# Patient Record
Sex: FEMALE | Race: WHITE | Employment: FULL TIME | URBAN - METROPOLITAN AREA
[De-identification: names, ages, dates, MRNs, and addresses within clinical notes are randomized per-mention and may not be internally consistent; named-entity substitution may affect disease eponyms.]

---

## 2016-12-07 LAB
ANTIBODY SCREEN, EXTERNAL: NEGATIVE
HBSAG, EXTERNAL: NEGATIVE
HCT, EXTERNAL: 38.4
HGB, EXTERNAL: 12.9
HIV, EXTERNAL: NEGATIVE
RPR, EXTERNAL: NON REACTIVE
RUBELLA, EXTERNAL: NORMAL
TYPE, ABO & RH, EXTERNAL: NORMAL

## 2017-06-02 ENCOUNTER — HOSPITAL ENCOUNTER (OUTPATIENT)
Age: 27
Setting detail: OBSERVATION
LOS: 1 days | Discharge: HOME | End: 2017-06-02
Attending: OBSTETRICS & GYNECOLOGY | Admitting: OBSTETRICS & GYNECOLOGY
Payer: COMMERCIAL

## 2017-06-02 VITALS
TEMPERATURE: 98.1 F | DIASTOLIC BLOOD PRESSURE: 90 MMHG | SYSTOLIC BLOOD PRESSURE: 134 MMHG | WEIGHT: 180 LBS | BODY MASS INDEX: 29.99 KG/M2 | HEIGHT: 65 IN | RESPIRATION RATE: 18 BRPM | HEART RATE: 74 BPM

## 2017-06-02 LAB
ALBUMIN SERPL BCP-MCNC: 2.3 G/DL (ref 3.5–5)
ALBUMIN/GLOB SERPL: 0.5 {RATIO} (ref 1.1–2.2)
ALP SERPL-CCNC: 202 U/L (ref 45–117)
ALT SERPL-CCNC: 174 U/L (ref 12–78)
ANION GAP BLD CALC-SCNC: 11 MMOL/L (ref 5–15)
AST SERPL W P-5'-P-CCNC: 99 U/L (ref 15–37)
BILIRUB SERPL-MCNC: 0.4 MG/DL (ref 0.2–1)
BUN SERPL-MCNC: 8 MG/DL (ref 6–20)
BUN/CREAT SERPL: 10 (ref 12–20)
CALCIUM SERPL-MCNC: 9.3 MG/DL (ref 8.5–10.1)
CHLORIDE SERPL-SCNC: 107 MMOL/L (ref 97–108)
CO2 SERPL-SCNC: 20 MMOL/L (ref 21–32)
CREAT SERPL-MCNC: 0.81 MG/DL (ref 0.55–1.02)
CREAT UR-MCNC: 35.9 MG/DL
ERYTHROCYTE [DISTWIDTH] IN BLOOD BY AUTOMATED COUNT: 13.4 % (ref 11.5–14.5)
GLOBULIN SER CALC-MCNC: 4.3 G/DL (ref 2–4)
GLUCOSE SERPL-MCNC: 113 MG/DL (ref 65–100)
HCT VFR BLD AUTO: 34.6 % (ref 35–47)
HGB BLD-MCNC: 11.9 G/DL (ref 11.5–16)
MCH RBC QN AUTO: 30 PG (ref 26–34)
MCHC RBC AUTO-ENTMCNC: 34.4 G/DL (ref 30–36.5)
MCV RBC AUTO: 87.2 FL (ref 80–99)
PLATELET # BLD AUTO: 210 K/UL (ref 150–400)
POTASSIUM SERPL-SCNC: 3.8 MMOL/L (ref 3.5–5.1)
PROT SERPL-MCNC: 6.6 G/DL (ref 6.4–8.2)
PROT UR-MCNC: 6 MG/DL (ref 0–11.9)
PROT/CREAT UR-RTO: 0.2
RBC # BLD AUTO: 3.97 M/UL (ref 3.8–5.2)
SODIUM SERPL-SCNC: 138 MMOL/L (ref 136–145)
WBC # BLD AUTO: 11.1 K/UL (ref 3.6–11)

## 2017-06-02 PROCEDURE — 80053 COMPREHEN METABOLIC PANEL: CPT | Performed by: OBSTETRICS & GYNECOLOGY

## 2017-06-02 PROCEDURE — 85027 COMPLETE CBC AUTOMATED: CPT | Performed by: OBSTETRICS & GYNECOLOGY

## 2017-06-02 PROCEDURE — 74011250636 HC RX REV CODE- 250/636: Performed by: OBSTETRICS & GYNECOLOGY

## 2017-06-02 PROCEDURE — 99218 HC RM OBSERVATION: CPT

## 2017-06-02 PROCEDURE — 84156 ASSAY OF PROTEIN URINE: CPT | Performed by: OBSTETRICS & GYNECOLOGY

## 2017-06-02 PROCEDURE — 65410000002 HC RM PRIVATE OB

## 2017-06-02 PROCEDURE — 82239 BILE ACIDS TOTAL: CPT | Performed by: OBSTETRICS & GYNECOLOGY

## 2017-06-02 PROCEDURE — 96372 THER/PROPH/DIAG INJ SC/IM: CPT

## 2017-06-02 PROCEDURE — 36415 COLL VENOUS BLD VENIPUNCTURE: CPT | Performed by: OBSTETRICS & GYNECOLOGY

## 2017-06-02 PROCEDURE — 59025 FETAL NON-STRESS TEST: CPT

## 2017-06-02 RX ORDER — URSODIOL 500 MG/1
500 TABLET, FILM COATED ORAL 2 TIMES DAILY
COMMUNITY
End: 2017-06-13

## 2017-06-02 RX ORDER — BETAMETHASONE SODIUM PHOSPHATE AND BETAMETHASONE ACETATE 3; 3 MG/ML; MG/ML
12 INJECTION, SUSPENSION INTRA-ARTICULAR; INTRALESIONAL; INTRAMUSCULAR; SOFT TISSUE EVERY 24 HOURS
Status: DISCONTINUED | OUTPATIENT
Start: 2017-06-02 | End: 2017-06-02 | Stop reason: HOSPADM

## 2017-06-02 RX ADMIN — BETAMETHASONE SODIUM PHOSPHATE AND BETAMETHASONE ACETATE 12 MG: 3; 3 INJECTION, SUSPENSION INTRA-ARTICULAR; INTRALESIONAL; INTRAMUSCULAR at 18:56

## 2017-06-02 NOTE — DISCHARGE INSTRUCTIONS
HYPERTENSIVE DISORDERS OF PREGNANCY DISCHARGE INSTRUCTIONS       Preeclampsia: Care Instructions  Your Care Instructions  Preeclampsia occurs when a woman's blood pressure rises during pregnancy. Often with preeclampsia, you also have swelling in your legs, hands, and face. A test may show too much protein in your urine. Preeclampsia is also called toxemia. If preeclampsia is severe and not treated, it can lead to seizures (eclampsia) and damage to your liver or kidneys. Preeclampsia can prevent your baby from getting enough food and oxygen. This can cause a low birth weight or other problems. Your doctor will watch you closely to prevent these problems. He or she also may recommend that you rest in bed most of the day. If your preeclampsia is a danger to your health or the health of your baby, your doctor may need to deliver your baby early. While preeclampsia is a concern, most women with preeclampsia have healthy babies. After a woman gives birth, preeclampsia usually goes away on its own. Follow-up care is a key part of your treatment and safety. Be sure to make and go to all appointments, and call your doctor if you are having problems. It's also a good idea to know your test results and keep a list of the medicines you take. How can you care for yourself at home? · Take and record your blood pressure at home if your doctor tells you to. ¨ Learn the importance of the two measures of blood pressure (such as 120 over 80, or 120/80). The first number is the systolic pressure, which is the force of blood on the artery walls as the heart pumps. The second number is the diastolic pressure, which is the force of blood on the artery walls between heartbeats, when the heart is at rest. You have a choice of monitors to use. ¨ Manual monitor: You pump up the cuff and use a stethoscope to listen for your pulse. ¨ Electronic monitor: The cuff inflates, and a gauge shows your pulse rate.   ¨ To take your blood pressure:  ¨ Ask your doctor to check your blood pressure monitor to be sure that it is accurate and that the cuff fits you. Also ask your doctor to watch you to make sure that you are using it right. ¨ You should not eat, use tobacco products, or use medicine known to raise blood pressure (such as some nasal decongestant sprays) before you take your blood pressure. ¨ Avoid taking your blood pressure if you have just exercised or are nervous or upset. Rest at least 15 minutes before you take your blood pressure. · If your doctor advises, check the protein levels in your urine. Your doctor or nurse will show you how to do this. · Take your medicines exactly as prescribed. Call your doctor if you think you are having a problem with your medicine. · Do not smoke. Quitting smoking will help lower your blood pressure and improve your baby's growth and health. If you need help quitting, talk to your doctor about stop-smoking programs and medicines. These can increase your chances of quitting for good. · Eat a balanced and healthy diet that has lots of fruits and vegetables. · If your doctor advised bed rest, be sure to stay off your feet and rest as much as possible. ¨ Keep a phone, phone book, notepad, and pen near the bed where you can easily reach them. ¨ Gently stretch your legs every hour to maintain good blood flow. ¨ Have another family member pack snacks and lunch food in a cooler close to your bed. ¨ Use this time for activities that you usually cannot find time for, such as reading, craft projects, or letter writing. · You can keep track of your baby's health by noting the length of time it takes to count 10 movements (such as kicks, flutters, or rolls). Feeling 10 movements in less than 1 hour is considered normal. Track your baby's movements once each day, and bring this record with you to each prenatal visit. When should you call for help? Call 911 anytime you think you may need emergency care. For example, call if:  · You have a seizure. · You passed out (lost consciousness). · You have severe vaginal bleeding. · You have severe pain in your belly or pelvis. · You have had fluid gushing or leaking from your vagina and you know or think the umbilical cord is bulging into your vagina. If this happens, immediately get down on your knees so your rear end (buttocks) is higher than your head. This will decrease the pressure on the cord until help arrives. Call your doctor now or seek immediate medical care if:  · You have sudden swelling of your face, hands, or feet. · You have new vision problems (such as dimness or blurring). · You have a severe headache. · You have any vaginal bleeding. · You have belly pain or cramping. · You have a fever. · You have had regular contractions (with or without pain) for an hour. This means that you have 8 or more within 1 hour or 4 or more in 20 minutes after you change your position and drink fluids. · You have a sudden release of fluid from the vagina. · You have low back pain or pelvic pressure that does not go away. · You notice that your baby has stopped moving or is moving much less than normal.  Watch closely for changes in your health, and be sure to contact your doctor if you have any questions. Where can you learn more? Go to http://natalie-morenita.info/. Enter K090 in the search box to learn more about \"Preeclampsia: Care Instructions. \"  Current as of: May 30, 2016  Content Version: 11.2  © 4047-6981 [employer]. Care instructions adapted under license by [employer] (which disclaims liability or warranty for this information). If you have questions about a medical condition or this instruction, always ask your healthcare professional. [address] any warranty or liability for your use of this information.       These are general instructions for a healthy lifestyle:    No smoking/ No tobacco products/ Avoid exposure to second hand smoke    Surgeon General's Warning:  Quitting smoking now greatly reduces serious risk to your health. Obesity, smoking, and sedentary lifestyle greatly increases your risk for illness    A healthy diet, regular physical exercise & weight monitoring are important for maintaining a healthy lifestyle    Recognize signs and symptoms of STROKE:    F-face looks uneven    A-arms unable to move or move unevenly    S-speech slurred or non-existent    T-time-call 911 as soon as signs and symptoms begin-DO NOT go       Back to bed or wait to see if you get better-TIME IS BRAIN. Return to Labor and Delivery 6/3 Saturday  at 6pm for blood pressure checks, a  second betamethasone and to turn in 24 hour urine collection.

## 2017-06-02 NOTE — PROGRESS NOTES
1515 pt 33 3/7 wks IUP to floor for nst, eval of bps, labs and bile acids. 1800  in to see pt, plan to administer betamethasone this evening, take 24 hour urine collection home and return to L&D 24 hours later for second beta and results of urine collection. Reviewed with patient collection process, and storage of urine. 1840 24 urine collection started    1900 pt sent home with instructions to continue to collect 24 hour urine, and a second jug if needed. As well as s/s of preeclampsia and when to report to doctor. Pt states she understands and is going to return to Labor and and delivery at 6p tomorrow.

## 2017-06-02 NOTE — IP AVS SNAPSHOT
Summary of Care Report The Summary of Care report has been created to help improve care coordination. Users with access to "[employer] or [address] (Web-based application) may access additional patient information including the Discharge Summary. If you are not currently a [address] user and need more information, please call the number listed below in the [place] [address] section and ask to be connected with Medical Records. Facility Information Name Address Phone [address] [facility] [phone] Patient Information Patient Name Sex  Radha Wiggins ([id number]) Female 1990 Discharge Information Admitting Provider Service Area Unit Hawk Walter MD / [phone] [address] Antepartum/Mi / [phone] Discharge Provider Discharge Date/Time Discharge Disposition Destination (none) 2017 (Pending) AHR (none) Patient Language Language ENGLISH [13] You are allergic to the following Allergen Reactions Pcn (Penicillins) Unknown (comments) Current Discharge Medication List  
  
ASK your doctor about these medications Dose & Instructions Dispensing Information Comments  
 famotidine 20 mg tablet Commonly known as:  PEPCID Dose:  20 mg Take 1 Tab by mouth two (2) times a day. Quantity:  20 Tab Refills:  0 PRENATA PO Take  by mouth. Refills:  0  
   
 ursodiol 500 mg tablet Commonly known as:  ACTIGALL Dose:  500 mg Take 500 mg by mouth two (2) times a day. Indications: CHOLELITHIASIS PREVENTION Refills:  0 Follow-up Information Follow up With Details Comments Contact Info Manoj Ingram MD   Patient can only remember the practice name and not the physician Discharge Instructions HYPERTENSIVE DISORDERS OF PREGNANCY DISCHARGE INSTRUCTIONS Preeclampsia: Care Instructions Your Care Instructions Preeclampsia occurs when a woman's blood pressure rises during pregnancy. Often with preeclampsia, you also have swelling in your legs, hands, and face. A test may show too much protein in your urine. Preeclampsia is also called toxemia. If preeclampsia is severe and not treated, it can lead to seizures (eclampsia) and damage to your liver or kidneys. Preeclampsia can prevent your baby from getting enough food and oxygen. This can cause a low birth weight or other problems. Your doctor will watch you closely to prevent these problems. He or she also may recommend that you rest in bed most of the day. If your preeclampsia is a danger to your health or the health of your baby, your doctor may need to deliver your baby early. While preeclampsia is a concern, most women with preeclampsia have healthy babies. After a woman gives birth, preeclampsia usually goes away on its own. Follow-up care is a key part of your treatment and safety. Be sure to make and go to all appointments, and call your doctor if you are having problems. It's also a good idea to know your test results and keep a list of the medicines you take. How can you care for yourself at home? · Take and record your blood pressure at home if your doctor tells you to. ¨ Learn the importance of the two measures of blood pressure (such as 120 over 80, or 120/80). The first number is the systolic pressure, which is the force of blood on the artery walls as the heart pumps. The second number is the diastolic pressure, which is the force of blood on the artery walls between heartbeats, when the heart is at rest. You have a choice of monitors to use. ¨ Manual monitor: You pump up the cuff and use a stethoscope to listen for your pulse. ¨ Electronic monitor: The cuff inflates, and a gauge shows your pulse rate. ¨ To take your blood pressure: ¨ Ask your doctor to check your blood pressure monitor to be sure that it is accurate and that the cuff fits you. Also ask your doctor to watch you to make sure that you are using it right. ¨ You should not eat, use tobacco products, or use medicine known to raise blood pressure (such as some nasal decongestant sprays) before you take your blood pressure. ¨ Avoid taking your blood pressure if you have just exercised or are nervous or upset. Rest at least 15 minutes before you take your blood pressure. · If your doctor advises, check the protein levels in your urine. Your doctor or nurse will show you how to do this. · Take your medicines exactly as prescribed. Call your doctor if you think you are having a problem with your medicine. · Do not smoke. Quitting smoking will help lower your blood pressure and improve your baby's growth and health. If you need help quitting, talk to your doctor about stop-smoking programs and medicines. These can increase your chances of quitting for good. · Eat a balanced and healthy diet that has lots of fruits and vegetables. · If your doctor advised bed rest, be sure to stay off your feet and rest as much as possible. ¨ Keep a phone, phone book, notepad, and pen near the bed where you can easily reach them. ¨ Gently stretch your legs every hour to maintain good blood flow. ¨ Have another family member pack snacks and lunch food in a cooler close to your bed. ¨ Use this time for activities that you usually cannot find time for, such as reading, craft projects, or letter writing. · You can keep track of your baby's health by noting the length of time it takes to count 10 movements (such as kicks, flutters, or rolls). Feeling 10 movements in less than 1 hour is considered normal. Track your baby's movements once each day, and bring this record with you to each prenatal visit. When should you call for help? Call 911 anytime you think you may need emergency care. For example, call if: 
· You have a seizure. · You passed out (lost consciousness). · You have severe vaginal bleeding. · You have severe pain in your belly or pelvis. · You have had fluid gushing or leaking from your vagina and you know or think the umbilical cord is bulging into your vagina. If this happens, immediately get down on your knees so your rear end (buttocks) is higher than your head. This will decrease the pressure on the cord until help arrives. Call your doctor now or seek immediate medical care if: 
· You have sudden swelling of your face, hands, or feet. · You have new vision problems (such as dimness or blurring). · You have a severe headache. · You have any vaginal bleeding. · You have belly pain or cramping. · You have a fever. · You have had regular contractions (with or without pain) for an hour. This means that you have 8 or more within 1 hour or 4 or more in 20 minutes after you change your position and drink fluids. · You have a sudden release of fluid from the vagina. · You have low back pain or pelvic pressure that does not go away. · You notice that your baby has stopped moving or is moving much less than normal. 
Watch closely for changes in your health, and be sure to contact your doctor if you have any questions. Where can you learn more? Go to http://natalie-morenita.info/. Enter W453 in the search box to learn more about \"Preeclampsia: Care Instructions. \" Current as of: May 30, 2016 Content Version: 11.2 © 0648-8449 [employer]. Care instructions adapted under license by [employer] (which disclaims liability or warranty for this information). If you have questions about a medical condition or this instruction, always ask your healthcare professional. [address] any warranty or liability for your use of this information. These are general instructions for a healthy lifestyle: No smoking/ No tobacco products/ Avoid exposure to second hand smoke Surgeon General's Warning:  Quitting smoking now greatly reduces serious risk to your health. Obesity, smoking, and sedentary lifestyle greatly increases your risk for illness A healthy diet, regular physical exercise & weight monitoring are important for maintaining a healthy lifestyle Recognize signs and symptoms of STROKE: 
 
F-face looks uneven A-arms unable to move or move unevenly S-speech slurred or non-existent T-time-call 911 as soon as signs and symptoms begin-DO NOT go Back to bed or wait to see if you get better-TIME IS BRAIN. Return to Labor and Delivery 6/3 Saturday  at 6pm for blood pressure checks, a  second betamethasone and to turn in 24 hour urine collection. Chart Review Routing History No Routing History on File

## 2017-06-02 NOTE — H&P
History & Physical    Name: Luis Bautista MRN: [id number]  SSN: [id number]    YOB: 1990  Age: 28 y.o. Sex: female      Subjective:     Reason for Admission:  Pregnancy and \"not feeling well\"    History of Present Illness: Ms. Elissa Orourke is a 28 y.o.  female with an estimated gestational age of 27w4d with Estimated Date of Delivery: 17. Patient complains of not feeling well today. Reports itching most of last night and only slept 2-3 hours. No headache/blurry vision/chest pain/sob/n/v. No RUQ pain but more discomfort in that area from fetal position. Normal FM. Bollinger marmolejo ctx occasionally. OB History    Para Term  AB SAB TAB Ectopic Multiple Living   1               # Outcome Date GA Lbr Leroy/2nd Weight Sex Delivery Anes PTL Lv   1 Current               No PMH  No PSH  Social History     Occupational History    Not on file. Social History Main Topics    Smoking status: Never Smoker    Smokeless tobacco: Not on file    Alcohol use No    Drug use: No    Sexual activity: Yes     Partners: Male      No family history on file. Allergies   Allergen Reactions    Pcn [Penicillins] Unknown (comments)     Prior to Admission medications    Medication Sig Start Date End Date Taking? Authorizing Provider   ursodiol (ACTIGALL) 500 mg tablet Take 500 mg by mouth two (2) times a day. Indications: CHOLELITHIASIS PREVENTION   Yes Historical Provider   PRENATAL VIT37/IRON/FOLIC ACID (PRENATA PO) Take  by mouth. Yes Phys Nataly, MD        Review of Systems:  A comprehensive review of systems was negative except for that written in the History of Present Illness.      Objective:     Vitals:    Vitals:    17 1520 17 1526 17 1604 17 1701   BP: 137/89  141/84 145/87   Pulse:   69 74   Resp: 18      Temp: 98.3 °F (36.8 °C)      Weight:  81.6 kg (180 lb)     Height:  5' 4.5\" (1.638 m)        Temp (24hrs), Av.3 °F (36.8 °C), Min:98.3 °F (36.8 °C), Max:98.3 °F (36.8 °C)    BP  Min: 137/89  Max: 145/87     Physical Exam:  Patient without distress. Heart: Regular rate and rhythm  Lung: clear to auscultation throughout lung fields, no wheezes, no rales, no rhonchi and normal respiratory effort  Abdomen: soft, nontender  Fundus: soft and non tender  Lower Extremities:  - Edema 1+     Membranes:  Intact  Uterine Activity:  irritability  Fetal Heart Rate:  150s mod +accels no decels       Lab/Data Review:  Recent Results (from the past 24 hour(s))   CBC W/O DIFF    Collection Time: 17  3:35 PM   Result Value Ref Range    WBC 11.1 (H) 3.6 - 11.0 K/uL    RBC 3.97 3.80 - 5.20 M/uL    HGB 11.9 11.5 - 16.0 g/dL    HCT 34.6 (L) 35.0 - 47.0 %    MCV 87.2 80.0 - 99.0 FL    MCH 30.0 26.0 - 34.0 PG    MCHC 34.4 30.0 - 36.5 g/dL    RDW 13.4 11.5 - 14.5 %    PLATELET 187 405 - 363 K/uL   METABOLIC PANEL, COMPREHENSIVE    Collection Time: 17  3:35 PM   Result Value Ref Range    Sodium 138 136 - 145 mmol/L    Potassium 3.8 3.5 - 5.1 mmol/L    Chloride 107 97 - 108 mmol/L    CO2 20 (L) 21 - 32 mmol/L    Anion gap 11 5 - 15 mmol/L    Glucose 113 (H) 65 - 100 mg/dL    BUN 8 6 - 20 MG/DL    Creatinine 0.81 0.55 - 1.02 MG/DL    BUN/Creatinine ratio 10 (L) 12 - 20      GFR est AA >60 >60 ml/min/1.73m2    GFR est non-AA >60 >60 ml/min/1.73m2    Calcium 9.3 8.5 - 10.1 MG/DL    Bilirubin, total 0.4 0.2 - 1.0 MG/DL    ALT (SGPT) 174 (H) 12 - 78 U/L    AST (SGOT) 99 (H) 15 - 37 U/L    Alk. phosphatase 202 (H) 45 - 117 U/L    Protein, total 6.6 6.4 - 8.2 g/dL    Albumin 2.3 (L) 3.5 - 5.0 g/dL    Globulin 4.3 (H) 2.0 - 4.0 g/dL    A-G Ratio 0.5 (L) 1.1 - 2.2     PROTEIN/CREATININE RATIO, URINE    Collection Time: 17  4:28 PM   Result Value Ref Range    Protein, urine random 6 0.0 - 11.9 mg/dL    Creatinine, urine 35.90 mg/dL    Protein/Creat. urine Ratio 0.2         Assessment and Plan: Active Problems:    * No active hospital problems.  *     28yo  at 33w3d with cholestasis of pregnancy admitted for observation. Mild range BPs but no other s/sx of preeclampsia. Long discussion with couple regarding inpatient observation vs discharge home with return to L&D tomorrow for BP check. Pt and  motivated for oupatient monitoring. Discussed diagnosis of cholestasis with plan for IOL 36-37 weeks. LFTs mildly elevated - not significantly worse from earlier this week. Repeat bile acids from here pending. Just got ursodiol rx today. Discussed also risk for preeclampsia and reviewed precautions to call in for. Would recommend modified activity. Discussed proceeding with betamethasone now and 2nd tomorrow. 24 hour urine collection to bring back tomorrow. Twice daily kick counts. Aware of risk for IUFD with cholestasis. Discussed issues late  babies have and reasons they have longer hospital stays. Discussed reasons we would deliver sooner than 36-37 weeks including worsening disease. NST reactive.     Signed By:  Blair Estes MD     2017

## 2017-06-02 NOTE — IP AVS SNAPSHOT
[address] 
[phone] Patient: Florencia Hemp MRN: [id number] You are allergic to the following Allergen Reactions Pcn (Penicillins) Unknown (comments) Recent Documentation Height Weight Breastfeeding? BMI OB Status Smoking Status 1.638 m 81.6 kg Yes 30.42 kg/m2 Pregnant Never Smoker Unresulted Labs Order Current Status BILE ACIDS, TOTAL In process Emergency Contacts Name Discharge Info Relation Home Work Mobile Alma Ramirez [1] Spouse [3] [phone] About your hospitalization You were admitted on:  June 2, 2017 You last received care in the:  [address] [place] [address] Road ANTEPARTUM/MI You were discharged on:  June 2, 2017 Unit phone number:  [phone] Why you were hospitalized Your primary diagnosis was:  Not on File Providers Seen During Your Hospitalizations Provider Role Specialty Primary office phone Pili Riley MD Attending Provider Gynecology [phone] Your Primary Care Physician (PCP) Primary Care Physician Office Phone Office Fax OTHER, PHYS ** None ** ** None ** Follow-up Information Follow up With Details Comments Contact Info Manoj Ingram MD   Patient can only remember the practice name and not the physician Current Discharge Medication List  
  
ASK your doctor about these medications Dose & Instructions Dispensing Information Comments Morning Noon Evening Bedtime  
 famotidine 20 mg tablet Commonly known as:  PEPCID Your last dose was: Your next dose is:    
   
   
 Dose:  20 mg Take 1 Tab by mouth two (2) times a day. Quantity:  20 Tab Refills:  0 PRENATA PO Your last dose was: Your next dose is: Take  by mouth. Refills:  0 ursodiol 500 mg tablet Commonly known as:  ACTIGALL Your last dose was: Your next dose is:    
   
   
 Dose:  500 mg Take 500 mg by mouth two (2) times a day. Indications: CHOLELITHIASIS PREVENTION Refills:  0 Discharge Instructions HYPERTENSIVE DISORDERS OF PREGNANCY DISCHARGE INSTRUCTIONS Preeclampsia: Care Instructions Your Care Instructions Preeclampsia occurs when a woman's blood pressure rises during pregnancy. Often with preeclampsia, you also have swelling in your legs, hands, and face. A test may show too much protein in your urine. Preeclampsia is also called toxemia. If preeclampsia is severe and not treated, it can lead to seizures (eclampsia) and damage to your liver or kidneys. Preeclampsia can prevent your baby from getting enough food and oxygen. This can cause a low birth weight or other problems. Your doctor will watch you closely to prevent these problems. He or she also may recommend that you rest in bed most of the day. If your preeclampsia is a danger to your health or the health of your baby, your doctor may need to deliver your baby early. While preeclampsia is a concern, most women with preeclampsia have healthy babies. After a woman gives birth, preeclampsia usually goes away on its own. Follow-up care is a key part of your treatment and safety. Be sure to make and go to all appointments, and call your doctor if you are having problems. It's also a good idea to know your test results and keep a list of the medicines you take. How can you care for yourself at home? · Take and record your blood pressure at home if your doctor tells you to. ¨ Learn the importance of the two measures of blood pressure (such as 120 over 80, or 120/80). The first number is the systolic pressure, which is the force of blood on the artery walls as the heart pumps.  The second number is the diastolic pressure, which is the force of blood on the artery walls between heartbeats, when the heart is at rest. You have a choice of monitors to use. ¨ Manual monitor: You pump up the cuff and use a stethoscope to listen for your pulse. ¨ Electronic monitor: The cuff inflates, and a gauge shows your pulse rate. ¨ To take your blood pressure: ¨ Ask your doctor to check your blood pressure monitor to be sure that it is accurate and that the cuff fits you. Also ask your doctor to watch you to make sure that you are using it right. ¨ You should not eat, use tobacco products, or use medicine known to raise blood pressure (such as some nasal decongestant sprays) before you take your blood pressure. ¨ Avoid taking your blood pressure if you have just exercised or are nervous or upset. Rest at least 15 minutes before you take your blood pressure. · If your doctor advises, check the protein levels in your urine. Your doctor or nurse will show you how to do this. · Take your medicines exactly as prescribed. Call your doctor if you think you are having a problem with your medicine. · Do not smoke. Quitting smoking will help lower your blood pressure and improve your baby's growth and health. If you need help quitting, talk to your doctor about stop-smoking programs and medicines. These can increase your chances of quitting for good. · Eat a balanced and healthy diet that has lots of fruits and vegetables. · If your doctor advised bed rest, be sure to stay off your feet and rest as much as possible. ¨ Keep a phone, phone book, notepad, and pen near the bed where you can easily reach them. ¨ Gently stretch your legs every hour to maintain good blood flow. ¨ Have another family member pack snacks and lunch food in a cooler close to your bed. ¨ Use this time for activities that you usually cannot find time for, such as reading, craft projects, or letter writing. · You can keep track of your baby's health by noting the length of time it takes to count 10 movements (such as kicks, flutters, or rolls). Feeling 10 movements in less than 1 hour is considered normal. Track your baby's movements once each day, and bring this record with you to each prenatal visit. When should you call for help? Call 911 anytime you think you may need emergency care. For example, call if: 
· You have a seizure. · You passed out (lost consciousness). · You have severe vaginal bleeding. · You have severe pain in your belly or pelvis. · You have had fluid gushing or leaking from your vagina and you know or think the umbilical cord is bulging into your vagina. If this happens, immediately get down on your knees so your rear end (buttocks) is higher than your head. This will decrease the pressure on the cord until help arrives. Call your doctor now or seek immediate medical care if: 
· You have sudden swelling of your face, hands, or feet. · You have new vision problems (such as dimness or blurring). · You have a severe headache. · You have any vaginal bleeding. · You have belly pain or cramping. · You have a fever. · You have had regular contractions (with or without pain) for an hour. This means that you have 8 or more within 1 hour or 4 or more in 20 minutes after you change your position and drink fluids. · You have a sudden release of fluid from the vagina. · You have low back pain or pelvic pressure that does not go away. · You notice that your baby has stopped moving or is moving much less than normal. 
Watch closely for changes in your health, and be sure to contact your doctor if you have any questions. Where can you learn more? Go to http://natalie-morenita.info/. Enter I478 in the search box to learn more about \"Preeclampsia: Care Instructions. \" Current as of: May 30, 2016 Content Version: 11.2 © 3543-0898 [employer], [employer]. Care instructions adapted under license by [employer] (which disclaims liability or warranty for this information). If you have questions about a medical condition or this instruction, always ask your healthcare professional. [address] any warranty or liability for your use of this information. These are general instructions for a healthy lifestyle: No smoking/ No tobacco products/ Avoid exposure to second hand smoke Surgeon General's Warning:  Quitting smoking now greatly reduces serious risk to your health. Obesity, smoking, and sedentary lifestyle greatly increases your risk for illness A healthy diet, regular physical exercise & weight monitoring are important for maintaining a healthy lifestyle Recognize signs and symptoms of STROKE: 
 
F-face looks uneven A-arms unable to move or move unevenly S-speech slurred or non-existent T-time-call 911 as soon as signs and symptoms begin-DO NOT go Back to bed or wait to see if you get better-TIME IS BRAIN. Return to Labor and Delivery 6/3 Saturday  at 6pm for blood pressure checks, a  second betamethasone and to turn in 24 hour urine collection. Discharge Orders None Introducing [facility]! Edith Elise introduces [employer] patient portal. Now you can access parts of your medical record, email your doctor's office, and request medication refills online. 1. In your internet browser, go to [email or web address] 2. Click on the First Time User? Click Here link in the Sign In box. You will see the New Member Sign Up page. 3. Enter your [employer] Access Code exactly as it appears below. You will not need to use this code after youve completed the sign-up process. If you do not sign up before the expiration date, you must request a new code. · [employer] Access Code: [email or web address] Expires: 8/31/2017  6:36 PM 
 
 4. Enter the last four digits of your Social Security Number (xxxx) and Date of Birth (mm/dd/yyyy) as indicated and click Submit. You will be taken to the next sign-up page. 5. Create a "[employer] ID. This will be your "[employer] login ID and cannot be changed, so think of one that is secure and easy to remember. 6. Create a "[employer] password. You can change your password at any time. 7. Enter your Password Reset Question and Answer. This can be used at a later time if you forget your password. 8. Enter your e-mail address. You will receive e-mail notification when new information is available in [address] 9. Click Sign Up. You can now view and download portions of your medical record. 10. Click the Download Summary menu link to download a portable copy of your medical information. If you have questions, please visit the Frequently Asked Questions section of the "[employer] website. Remember, "[employer] is NOT to be used for urgent needs. For medical emergencies, dial 911. Now available from your iPhone and Android! General Information Please provide this summary of care documentation to your next provider. Patient Signature:  ____________________________________________________________ Date:  ____________________________________________________________  
  
[facility] Provider Signature:  ____________________________________________________________ Date:  ____________________________________________________________

## 2017-06-02 NOTE — IP AVS SNAPSHOT
Current Discharge Medication List  
  
ASK your doctor about these medications Dose & Instructions Dispensing Information Comments Morning Noon Evening Bedtime  
 famotidine 20 mg tablet Commonly known as:  PEPCID Your last dose was: Your next dose is:    
   
   
 Dose:  20 mg Take 1 Tab by mouth two (2) times a day. Quantity:  20 Tab Refills:  0 PRENATA PO Your last dose was: Your next dose is: Take  by mouth. Refills:  0  
     
   
   
   
  
 ursodiol 500 mg tablet Commonly known as:  ACTIGALL Your last dose was: Your next dose is:    
   
   
 Dose:  500 mg Take 500 mg by mouth two (2) times a day. Indications: CHOLELITHIASIS PREVENTION Refills:  0

## 2017-06-03 ENCOUNTER — HOSPITAL ENCOUNTER (INPATIENT)
Age: 27
LOS: 9 days | Discharge: HOME | End: 2017-06-13
Attending: OBSTETRICS & GYNECOLOGY | Admitting: OBSTETRICS & GYNECOLOGY
Payer: COMMERCIAL

## 2017-06-03 PROBLEM — O14.90 PREECLAMPSIA: Status: ACTIVE | Noted: 2017-06-03

## 2017-06-03 LAB
ALBUMIN SERPL BCP-MCNC: 2.6 G/DL (ref 3.5–5)
ALBUMIN/GLOB SERPL: 0.7 {RATIO} (ref 1.1–2.2)
ALP SERPL-CCNC: 216 U/L (ref 45–117)
ALT SERPL-CCNC: 310 U/L (ref 12–78)
ANION GAP BLD CALC-SCNC: 11 MMOL/L (ref 5–15)
AST SERPL W P-5'-P-CCNC: 189 U/L (ref 15–37)
BILIRUB SERPL-MCNC: 0.4 MG/DL (ref 0.2–1)
BUN SERPL-MCNC: 9 MG/DL (ref 6–20)
BUN/CREAT SERPL: 10 (ref 12–20)
CALCIUM SERPL-MCNC: 9.3 MG/DL (ref 8.5–10.1)
CHLORIDE SERPL-SCNC: 106 MMOL/L (ref 97–108)
CO2 SERPL-SCNC: 23 MMOL/L (ref 21–32)
COLLECT DURATION TIME UR: 24 HR
COLLECT DURATION TIME UR: 24 HR
CREAT CL 24H UR+SERPL-VRATE: 137 ML/MIN (ref 88–128)
CREAT SERPL-MCNC: 0.94 MG/DL (ref 0.55–1.02)
CREAT SERPL-MCNC: 0.94 MG/DL (ref 0.55–1.02)
ERYTHROCYTE [DISTWIDTH] IN BLOOD BY AUTOMATED COUNT: 13.6 % (ref 11.5–14.5)
GLOBULIN SER CALC-MCNC: 3.7 G/DL (ref 2–4)
GLUCOSE SERPL-MCNC: 107 MG/DL (ref 65–100)
GRBS, EXTERNAL: NEGATIVE
HCT VFR BLD AUTO: 35.3 % (ref 35–47)
HGB BLD-MCNC: 12.2 G/DL (ref 11.5–16)
LDH SERPL L TO P-CCNC: 229 U/L (ref 81–246)
MCH RBC QN AUTO: 30.3 PG (ref 26–34)
MCHC RBC AUTO-ENTMCNC: 34.6 G/DL (ref 30–36.5)
MCV RBC AUTO: 87.6 FL (ref 80–99)
PLATELET # BLD AUTO: 234 K/UL (ref 150–400)
POTASSIUM SERPL-SCNC: 3.9 MMOL/L (ref 3.5–5.1)
PROT 24H UR-MRATE: 303 MG/24HR
PROT SERPL-MCNC: 6.3 G/DL (ref 6.4–8.2)
RBC # BLD AUTO: 4.03 M/UL (ref 3.8–5.2)
SODIUM SERPL-SCNC: 140 MMOL/L (ref 136–145)
SPECIMEN VOL ?TM UR: 4325 ML
SPECIMEN VOL ?TM UR: 4325 ML
URATE SERPL-MCNC: 5.7 MG/DL (ref 2.6–6)
WBC # BLD AUTO: 15.2 K/UL (ref 3.6–11)

## 2017-06-03 PROCEDURE — 36415 COLL VENOUS BLD VENIPUNCTURE: CPT | Performed by: OBSTETRICS & GYNECOLOGY

## 2017-06-03 PROCEDURE — 80053 COMPREHEN METABOLIC PANEL: CPT | Performed by: OBSTETRICS & GYNECOLOGY

## 2017-06-03 PROCEDURE — 99285 EMERGENCY DEPT VISIT HI MDM: CPT

## 2017-06-03 PROCEDURE — 77030012890

## 2017-06-03 PROCEDURE — 84550 ASSAY OF BLOOD/URIC ACID: CPT | Performed by: OBSTETRICS & GYNECOLOGY

## 2017-06-03 PROCEDURE — 86900 BLOOD TYPING SEROLOGIC ABO: CPT | Performed by: OBSTETRICS & GYNECOLOGY

## 2017-06-03 PROCEDURE — 83615 LACTATE (LD) (LDH) ENZYME: CPT | Performed by: OBSTETRICS & GYNECOLOGY

## 2017-06-03 PROCEDURE — 87081 CULTURE SCREEN ONLY: CPT | Performed by: OBSTETRICS & GYNECOLOGY

## 2017-06-03 PROCEDURE — 74011250636 HC RX REV CODE- 250/636: Performed by: OBSTETRICS & GYNECOLOGY

## 2017-06-03 PROCEDURE — 85027 COMPLETE CBC AUTOMATED: CPT | Performed by: OBSTETRICS & GYNECOLOGY

## 2017-06-03 RX ORDER — ZOLPIDEM TARTRATE 5 MG/1
5 TABLET ORAL
Status: DISCONTINUED | OUTPATIENT
Start: 2017-06-03 | End: 2017-06-13 | Stop reason: HOSPADM

## 2017-06-03 RX ORDER — FAMOTIDINE 20 MG/1
20 TABLET, FILM COATED ORAL
Status: DISCONTINUED | OUTPATIENT
Start: 2017-06-03 | End: 2017-06-08

## 2017-06-03 RX ORDER — URSODIOL 500 MG/1
500 TABLET, FILM COATED ORAL
Status: DISCONTINUED | OUTPATIENT
Start: 2017-06-03 | End: 2017-06-10

## 2017-06-03 RX ORDER — SWAB
1 SWAB, NON-MEDICATED MISCELLANEOUS DAILY
Status: DISCONTINUED | OUTPATIENT
Start: 2017-06-04 | End: 2017-06-13 | Stop reason: HOSPADM

## 2017-06-03 RX ORDER — HYDROMORPHONE HYDROCHLORIDE 2 MG/1
2 TABLET ORAL
Status: DISCONTINUED | OUTPATIENT
Start: 2017-06-03 | End: 2017-06-08

## 2017-06-03 RX ORDER — ONDANSETRON 4 MG/1
4 TABLET, ORALLY DISINTEGRATING ORAL
Status: DISCONTINUED | OUTPATIENT
Start: 2017-06-03 | End: 2017-06-08

## 2017-06-03 RX ORDER — DOCUSATE SODIUM 100 MG/1
100 CAPSULE, LIQUID FILLED ORAL
Status: DISCONTINUED | OUTPATIENT
Start: 2017-06-03 | End: 2017-06-08

## 2017-06-03 RX ORDER — SODIUM CHLORIDE 0.9 % (FLUSH) 0.9 %
5-10 SYRINGE (ML) INJECTION AS NEEDED
Status: DISCONTINUED | OUTPATIENT
Start: 2017-06-03 | End: 2017-06-08

## 2017-06-03 RX ORDER — BETAMETHASONE SODIUM PHOSPHATE AND BETAMETHASONE ACETATE 3; 3 MG/ML; MG/ML
12 INJECTION, SUSPENSION INTRA-ARTICULAR; INTRALESIONAL; INTRAMUSCULAR; SOFT TISSUE ONCE
Status: COMPLETED | OUTPATIENT
Start: 2017-06-03 | End: 2017-06-03

## 2017-06-03 RX ORDER — DIPHENHYDRAMINE HCL 25 MG
25 CAPSULE ORAL
Status: DISCONTINUED | OUTPATIENT
Start: 2017-06-03 | End: 2017-06-08

## 2017-06-03 RX ORDER — SODIUM CHLORIDE 0.9 % (FLUSH) 0.9 %
5-10 SYRINGE (ML) INJECTION EVERY 8 HOURS
Status: DISCONTINUED | OUTPATIENT
Start: 2017-06-03 | End: 2017-06-06

## 2017-06-03 RX ADMIN — URSODIOL 500 MG: 500 TABLET, FILM COATED ORAL at 23:15

## 2017-06-03 RX ADMIN — BETAMETHASONE SODIUM PHOSPHATE AND BETAMETHASONE ACETATE 12 MG: 3; 3 INJECTION, SUSPENSION INTRA-ARTICULAR; INTRALESIONAL; INTRAMUSCULAR at 19:09

## 2017-06-03 NOTE — IP AVS SNAPSHOT
[address] 
[phone] Patient: Shane Recinos MRN: [id number] ALT:6/5/4665 You are allergic to the following Allergen Reactions Pcn (Penicillins) Unknown (comments) Recent Documentation Height Weight Breastfeeding? BMI OB Status Smoking Status 1.626 m 86.9 kg Unknown 32.87 kg/m2 Recent pregnancy Never Smoker Emergency Contacts Name Discharge Info Relation Home Work Mobile Marylin Noyola [1] Spouse [3] [phone] About your hospitalization You were admitted on:  June 4, 2017 You last received care in the:  [address] [place] [address] Road ANTEPARTUM/MI You were discharged on:  June 13, 2017 Unit phone number:  [phone] Why you were hospitalized Your primary diagnosis was:  Not on File Your diagnoses also included:  Preeclampsia Providers Seen During Your Hospitalizations Provider Role Specialty Primary office phone Jonh Cr MD Attending Provider Gynecology [phone] Your Primary Care Physician (PCP) Primary Care Physician Office Phone Office Fax OTHER, PHYS ** None ** ** None ** Follow-up Information Follow up With Details Comments Contact Info Manoj Ingram MD   Patient can only remember the practice name and not the physician In 2 weeks Current Discharge Medication List  
  
START taking these medications Dose & Instructions Dispensing Information Comments Morning Noon Evening Bedtime  
 oxyCODONE IR 5 mg immediate release tablet Commonly known as:  Henry Ricks Your last dose was: Your next dose is:    
   
   
 Dose:  5 mg Take 1 Tab by mouth every four (4) hours as needed. Max Daily Amount: 30 mg.  
 Quantity:  20 Tab Refills:  0 CONTINUE these medications which have NOT CHANGED Dose & Instructions Dispensing Information Comments Morning Noon Evening Bedtime PRENATA PO Your last dose was: Your next dose is: Take  by mouth. Refills:  0 STOP taking these medications   
 ursodiol 500 mg tablet Commonly known as:  ACTIGALL Where to Get Your Medications Information on where to get these meds will be given to you by the nurse or doctor. ! Ask your nurse or doctor about these medications  
  oxyCODONE IR 5 mg immediate release tablet Discharge Instructions POSTPARTUM DISCHARGE INSTRUCTIONS Name:  Arnulfo Arias YOB: 1990 Admission Diagnosis:  pregnancy Preeclampsia Discharge Diagnosis:   
Problem List as of 6/13/2017  Never Reviewed Codes Class Noted - Resolved Preeclampsia ICD-10-CM: O14.90 ICD-9-CM: 642.40  6/3/2017 - Present Attending Physician:  Radha Trejo MD 
 
Delivery Type:  Vaginal Childbirth: What To Expect At Home Your Recovery: Your body will slowly heal in the next few weeks. It is easy to get too tired and overwhelmed during the first weeks after your baby is born. Changes in your hormones can shift your mood without warning. You may find it hard to meet the extra demands on your energy and time. Take it easy on yourself. Follow-up care is a key part of your treatment and safety. Be sure to make and go to all appointments, and call your doctor if you are having problems. It's also a good idea to know your test results and keep a list of the medicines you take. How can you care for yourself at home? Vaginal bleeding and cramps · After delivery, you will have a bloody discharge from the vagina. This will turn pink within a week and then white or yellow after about 10 days. It may last for 2 to 4 weeks or longer, until the uterus has healed. Use pads instead of tampons until you stop bleeding. · Do not worry if you pass some blood clots, as long as they are smaller than a golf ball. If you have a tear or stitches in your vaginal area, change the pad at least every 4 hours to prevent soreness and infection. · You may have cramps for the first few days after childbirth. These are normal and occur as the uterus shrinks to normal size. Take an over-the-counter pain medicine, such as acetaminophen (Tylenol), ibuprofen (Advil, Motrin), or naproxen (Aleve), for cramps. Read and follow all instructions on the label. Do not take aspirin, because it can cause more bleeding. Do not take acetaminophen (Tylenol) and other acetaminophen containing medications (i.e. Percocet) at the same time. Breast fullness · Your breasts may overfill (engorge) in the first few days after delivery. To help milk flow and to relieve pain, warm your breasts in the shower or by using warm, moist towels before nursing. · If you are not nursing, do not put warmth on your breasts or touch your breasts. Wear a tight bra or sports bra and use ice until the fullness goes away. This usually takes 2 to 3 days. · Put ice or a cold pack on your breast after nursing to reduce swelling and pain. Put a thin cloth between the ice and your skin. Activity · Eat a balanced diet. Do not try to lose weight by cutting calories. Keep taking your prenatal vitamins, or take a multivitamin. · Get as much rest as you can. Try to take naps when your baby sleeps during the day. · Get some exercise every day. But do not do any heavy exercise until your doctor says it is okay. · Wait until you are healed (about 4 to 6 weeks) before you have sexual intercourse. Your doctor will tell you when it is okay to have sex. · Talk to your doctor about birth control. You can get pregnant even before your period returns. Also, you can get pregnant while you are breast-feeding. Mental Health · Many women get the \"baby blues\" during the first few days after childbirth. You may lose sleep, feel irritable, and cry easily. You may feel happy one minute and sad the next. Hormone changes are one cause of these emotional changes. Also, the demands of a new baby, along with visits from relatives or other family needs, add to a mother's stress. The \"baby blues\" often peak around the fourth day. Then they ease up in less than 2 weeks. · If your moodiness or anxiety lasts for more than 2 weeks, or if you feel like life is not worth living, you may have postpartum depression. This is different for each mother. Some mothers with serious depression may worry intensely about their infant's well-being. Others may feel distant from their child. Some mothers might even feel that they might harm their baby. A mother may have signs of paranoia, wondering if someone is watching her. · With all the changes in your life, you may not know if you are depressed. Pregnancy sometimes causes changes in how you feel that are similar to the symptoms of depression. · Symptoms of depression include: · Feeling sad or hopeless and losing interest in daily activities. These are the most common symptoms of depression. · Sleeping too much or not enough. · Feeling tired. You may feel as if you have no energy. · Eating too much or too little. · POSTPARTUM SUPPORT INTERNATIONAL (PSI) offers a Warm line; Chat with the Expert phone sessions; Information and Articles about Pregnancy and Postpartum Mood Disorders; Comprehensive List of Free Support Groups; Knowledgeable local coordinators who will offer support, information, and resources; Guide to Resources on SnapS[employer]; Calendar of events in the  mood disorders community; Latest News and Research; and [facility] [address] for [employer]. Remember - You are not alone; You are not to blame; With help, you will be well. 6-570-373-PPD(0095). WWW. POSTPARTUM. NET · Writing or talking about death, such as writing suicide notes or talking about guns, knives, or pills. Keep the numbers for these national suicide hotlines: 2-792-935-TALK (8-499.605.7679) and 5-122-IAMDGDR (2-292.241.7874). If you or someone you know talks about suicide or feeling hopeless, get help right away. Constipation and Hemorrhoids · Drink plenty of fluids, enough so that your urine is light yellow or clear like water. If you have kidney, heart, or liver disease and have to limit fluids, talk with your doctor before you increase the amount of fluids you drink. · Eat plenty of fiber each day. Have a bran muffin or bran cereal for breakfast, and try eating a piece of fruit for a mid-afternoon snack. · For painful, itchy hemorrhoids, put ice or a cold pack on the area several times a day for 10 minutes at a time. Follow this by putting a warm compress on the area for another 10 to 20 minutes or by sitting in a shallow, warm bath. When should you call for help? Call 911 anytime you think you may need emergency care. For example, call if: 
· You are thinking of hurting yourself, your baby, or anyone else. · You passed out (lost consciousness). · You have symptoms of a blood clot in your lung (called a pulmonary embolism). These may include:   
· Sudden chest pain. · Trouble breathing. · Coughing up blood. Call your doctor now or seek immediate medical care if: 
· You have severe vaginal bleeding. · You are soaking through a pad each hour for 2 or more hours. · Your vaginal bleeding seems to be getting heavier or is still bright red 4 days after delivery. · You are dizzy or lightheaded, or you feel like you may faint. · You are vomiting or cannot keep fluids down. · You have a fever. · You have new or more belly pain. · You pass tissue (not just blood). · Your vaginal discharge smells bad. · Your belly feels tender or full and hard. · Your breasts are continuously painful or red. · You feel sad, anxious, or hopeless for more than a few days. · You have sudden, severe pain in your belly. · You have symptoms of a blood clot in your leg (called a deep vein thrombosis),  
       such as: 
· Pain in your calf, back of the knee, thigh, or groin. · Redness and swelling in your leg or groin. · You have symptoms of preeclampsia, such as: 
· Sudden swelling of your face, hands, or feet. · New vision problems (such as dimness or blurring). · A severe headache. · Your blood pressure is higher than it should be or rises suddenly. · You have new nausea or vomiting. Watch closely for changes in your health, and be sure to contact your doctor if you have any problems. Additional Information:  Learning About Hypertensive Disorders After Childbirth What is preeclampsia? A woman with preeclampsia has blood pressure that is higher than usual. She may also have other serious symptoms. Preeclampsia can be dangerous. When it is severe, it can cause seizures (eclampsia) or liver or kidney damage. When the liver is affected, some women get HELLP syndrome, a blood-clotting and bleeding problem. HELLP can come on quickly and can be deadly. This is why your doctor checks you and your baby often. Preeclampsia usually occurs after 20 weeks of pregnancy. In rare cases, it is first noted right after childbirth. Most often, it starts near the end of pregnancy and goes away after childbirth. What are the symptoms? Mild preeclampsia usually doesn't cause symptoms. But preeclampsia can cause rapid weight gain and sudden swelling of the hands and face. Severe preeclampsia does cause symptoms. It can cause a very bad headache and trouble seeing and breathing. It also can cause belly pain. You may also urinate less than usual. 
 
If you have new preeclampsia symptoms after you go home from the hospital, call your doctor right away. What can you expect after you have had preeclampsia?  
 
In the hospital 
 After the baby and the placenta are delivered, preeclampsia usually starts to improve. Most women get better in the first few days after childbirth. After having preeclampsia, you still have a risk of seizures for a day or more after childbirth. (Very rarely, seizures happen later on.) So your doctor may have you take magnesium sulfate for a day or more to prevent seizures. You may also take medicine to lower your blood pressure. When you go home Your blood pressure will most likely return to normal a few days after delivery. Your doctor will want to check your blood pressure sometime in the first week after you leave the hospital. 
 
Some women still have high blood pressure 6 weeks after childbirth. But most return to normal levels over the long term. · Take and record your blood pressure at home if your doctor tells you to. · Learn the importance of the two measures of blood pressure (such as 120 over 80, or 120/80). The first number is the systolic pressure. This is the force of blood on the artery walls as the heart pumps. The second number is the diastolic pressure. This is the force of blood on the artery walls between heartbeats, when the heart is at rest. You have a choice of monitors to use. Manual monitor: You pump up the cuff and use a stethoscope to listen for your  Pulse. · Electronic monitor: The cuff inflates, and a gauge shows your pulse rate. · To take your blood pressure: · Ask your doctor to check your blood pressure monitor to be sure that it is accurate and that the cuff fits you. Also ask your doctor to watch you use it, to make sure that you are using it right. · You should not eat, use tobacco products, or use medicine known to raise blood pressure (such as some nasal decongestant sprays) before you take your blood pressure. · Avoid taking your blood pressure if you have just exercised or are nervous or upset. Rest at least 15 minutes before you take your blood pressure. · Be safe with medicines. If you take medicine, take it exactly as prescribed. Call your doctor if you think you are having a problem with your medicine. · Do not smoke. Quitting smoking will help lower your blood pressure and improve your baby's growth and health. If you need help quitting, talk to your doctor about stop-smoking programs and medicines. These can increase your chances of quitting for good. · Eat a balanced and healthy diet that has lots of fruits and vegetables. Long-term health After you have had preeclampsia, you have a higher-than-average risk of heart disease, stroke, and kidney disease. This may be because the same things that cause preeclampsia also cause heart and kidney disease. To protect your health, work with your doctor on living a heart-healthy lifestyle and getting the checkups you need. Your doctor may also want you to check your blood pressure at home. Follow-up care is a key part of your treatment and safety. Be sure to make and go to all appointments, and call your doctor if you are having problems. It's also a good idea to know your test results and keep a list of the medicines you take. Postpartum Support PARENTS:  Are you feeling sad or depressed? Is it difficult for you to enjoy yourself? Do you feel more irritable or tense? Do you feel anxious or panicky? Are you having difficulty bonding with your baby? Do you feel as if you are \"out of control\" or \"going crazy\"? Are you worried that you might hurt your baby or yourself? FAMILIES: Do you worry that something is wrong but don't know how to help? Do you think that your partner or spouse is having problems coping? Are you worried that it may never get better? While many women experience some mild mood change or \"the blues\" during or after the birth of a child, 1 in 9 women experience more significant symptoms of depression or anxiety. 1 in 10 Dads become depressed during the first year. Things you can do Being a good parent includes taking care of yourself. If you take care of yourself, you will be able to take better care of your baby and your family. · Talk to a counselor or healthcare provider who has training in  mood and anxiety problems. · Learn as much as you can about pregnancy and postpartum depression and anxiety. · Get support from family and friends. Ask for help when you need it. · Join a support group in your area or online. · Keep active by walking, stretching or whatever form of exercise helps you to feel better. · Get enough rest and time for yourself. · Eat a healthy diet. · Don't give up! It may take more than one try to get the right help you need. These are general instructions for a healthy lifestyle: No smoking/ No tobacco products/ Avoid exposure to second hand smoke Surgeon General's Warning:  Quitting smoking now greatly reduces serious risk to your health. Obesity, smoking, and sedentary lifestyle greatly increases your risk for illness A healthy diet, regular physical exercise & weight monitoring are important for maintaining a healthy lifestyle Recognize signs and symptoms of STROKE: 
 
F-face looks uneven A-arms unable to move or move unevenly S-speech slurred or non-existent T-time-call 911 as soon as signs and symptoms begin - DO NOT go  
    back to bed or wait to see if you get better - TIME IS BRAIN. I have had the opportunity to make my options or choices for discharge. I have received and understand these instructions. Discharge Orders None [facility] Announcement We are excited to announce that we are making your provider's discharge notes available to you in [employer]. You will see these notes when they are completed and signed by the physician that discharged you from your recent hospital stay.   If you have any questions or concerns about any information you see in [employer], please call the Health Information Department where you were seen or reach out to your Primary Care Provider for more information about your plan of care. Introducing [facility] [place] [facility]! Vignesh Lewis introduces [employer] patient portal. Now you can access parts of your medical record, email your doctor's office, and request medication refills online. 1. In your internet browser, go to [email or web address] 2. Click on the First Time User? Click Here link in the Sign In box. You will see the New Member Sign Up page. 3. Enter your [employer] Access Code exactly as it appears below. You will not need to use this code after youve completed the sign-up process. If you do not sign up before the expiration date, you must request a new code. · [employer] Access Code: [email or web address] Expires: 8/31/2017  6:36 PM 
 
4. Enter the last four digits of your Social Security Number (xxxx) and Date of Birth (mm/dd/yyyy) as indicated and click Submit. You will be taken to the next sign-up page. 5. Create a [employer] ID. This will be your [employer] login ID and cannot be changed, so think of one that is secure and easy to remember. 6. Create a [employer] password. You can change your password at any time. 7. Enter your Password Reset Question and Answer. This can be used at a later time if you forget your password. 8. Enter your e-mail address. You will receive e-mail notification when new information is available in [address] 9. Click Sign Up. You can now view and download portions of your medical record. 10. Click the Download Summary menu link to download a portable copy of your medical information. If you have questions, please visit the Frequently Asked Questions section of the [employer] website. Remember, [employer] is NOT to be used for urgent needs. For medical emergencies, dial 911. Now available from your iPhone and Android! General Information Please provide this summary of care documentation to your next provider. Patient Signature:  ____________________________________________________________ Date:  ____________________________________________________________  
  
Arianne Curlin Provider Signature:  ____________________________________________________________ Date:  ____________________________________________________________

## 2017-06-03 NOTE — PROGRESS NOTES
1800 G 1 P 0 pt of Dr Tracey Gutierrez received to labor and delivery for second dose of betamethasone, blood pressure check, 24 hour urine completion and NST. Pt was seen here yesterday with complaint of not feeling well. States that she feels better today but that she had an episode of SOB and light headedness earlier today when showering and getting ready. Denies headache, visual disturbance and epigastric pain. Reflexes +2 - +3 no clonus. 2+ pitting edema in lower extremities. 33 weeks 4 days. Pt has cholestasis. 1925 Dr Tracey Gutierrez notified of reactive NST, Blood pressures, reflexes, edema, and that she feels well now but felt SOB and light headed earlier today  1935 Bedside and Verbal shift change report given to Chelsea Carroll RN (oncoming nurse) by Meera Araya RN (offgoing nurse). Report included the following information SBAR, Accordion and Recent Results.

## 2017-06-03 NOTE — IP AVS SNAPSHOT
Current Discharge Medication List  
  
START taking these medications Dose & Instructions Dispensing Information Comments Morning Noon Evening Bedtime  
 oxyCODONE IR 5 mg immediate release tablet Commonly known as:  Gar Spine Your last dose was: Your next dose is:    
   
   
 Dose:  5 mg Take 1 Tab by mouth every four (4) hours as needed. Max Daily Amount: 30 mg.  
 Quantity:  20 Tab Refills:  0 CONTINUE these medications which have NOT CHANGED Dose & Instructions Dispensing Information Comments Morning Noon Evening Bedtime PRENATA PO Your last dose was: Your next dose is: Take  by mouth. Refills:  0 STOP taking these medications   
 ursodiol 500 mg tablet Commonly known as:  ACTIGALL Where to Get Your Medications Information on where to get these meds will be given to you by the nurse or doctor. ! Ask your nurse or doctor about these medications  
  oxyCODONE IR 5 mg immediate release tablet

## 2017-06-03 NOTE — IP AVS SNAPSHOT
Summary of Care Report The Summary of Care report has been created to help improve care coordination. Users with access to [employer] or [address] (Web-based application) may access additional patient information including the Discharge Summary. If you are not currently a [address] user and need more information, please call the number listed below in the [place] [address] section and ask to be connected with Medical Records. Facility Information Name Address Phone [phone] [phone] Patient Information Patient Name Sex ZAKIYA Archuleta ([id number]) Female 1990 Discharge Information Admitting Provider Service Area Unit Winsome Servin MD / [phone] [address] Antepartum/Mi / [phone] Discharge Provider Discharge Date/Time Discharge Disposition Destination (none) 2017 (Pending) AHR (none) Patient Language Language ENGLISH [13] Hospital Problems as of 2017  Never Reviewed Class Noted - Resolved Last Modified POA Active Problems Preeclampsia  6/3/2017 - Present 6/3/2017 by Winsome Servin MD Unknown Entered by Winsome Servin MD  
  
You are allergic to the following Allergen Reactions Pcn (Penicillins) Unknown (comments) Current Discharge Medication List  
  
START taking these medications Dose & Instructions Dispensing Information Comments  
 oxyCODONE IR 5 mg immediate release tablet Commonly known as:  [place] Short Dose:  5 mg Take 1 Tab by mouth every four (4) hours as needed. Max Daily Amount: 30 mg.  
 Quantity:  20 Tab Refills:  0 CONTINUE these medications which have NOT CHANGED Dose & Instructions Dispensing Information Comments PRENATA PO Take  by mouth. Refills:  0 STOP taking these medications Comments  
 ursodiol 500 mg tablet Commonly known as:  ACTIGALL Surgery Information ID Date/Time Status Primary Surgeon All Procedures Location [id number] 6/9/2017 Complete   [facility] - DO NOT SCHEDULE Follow-up Information Follow up With Details Comments Contact Info Manoj Ingram MD   Patient can only remember the practice name and not the physician In 2 weeks Discharge Instructions POSTPARTUM DISCHARGE INSTRUCTIONS Name:  Emely Rogers YOB: 1990 Admission Diagnosis:  pregnancy Preeclampsia Discharge Diagnosis:   
Problem List as of 6/13/2017  Never Reviewed Codes Class Noted - Resolved Preeclampsia ICD-10-CM: O14.90 ICD-9-CM: 642.40  6/3/2017 - Present Attending Physician:  Jaxson Stoddard MD 
 
Delivery Type:  Vaginal Childbirth: What To Expect At Home Your Recovery: Your body will slowly heal in the next few weeks. It is easy to get too tired and overwhelmed during the first weeks after your baby is born. Changes in your hormones can shift your mood without warning. You may find it hard to meet the extra demands on your energy and time. Take it easy on yourself. Follow-up care is a key part of your treatment and safety. Be sure to make and go to all appointments, and call your doctor if you are having problems. It's also a good idea to know your test results and keep a list of the medicines you take. How can you care for yourself at home? Vaginal bleeding and cramps · After delivery, you will have a bloody discharge from the vagina. This will turn pink within a week and then white or yellow after about 10 days. It may last for 2 to 4 weeks or longer, until the uterus has healed. Use pads instead of tampons until you stop bleeding.  
· Do not worry if you pass some blood clots, as long as they are smaller than a golf ball. If you have a tear or stitches in your vaginal area, change the pad at least every 4 hours to prevent soreness and infection. · You may have cramps for the first few days after childbirth. These are normal and occur as the uterus shrinks to normal size. Take an over-the-counter pain medicine, such as acetaminophen (Tylenol), ibuprofen (Advil, Motrin), or naproxen (Aleve), for cramps. Read and follow all instructions on the label. Do not take aspirin, because it can cause more bleeding. Do not take acetaminophen (Tylenol) and other acetaminophen containing medications (i.e. Percocet) at the same time. Breast fullness · Your breasts may overfill (engorge) in the first few days after delivery. To help milk flow and to relieve pain, warm your breasts in the shower or by using warm, moist towels before nursing. · If you are not nursing, do not put warmth on your breasts or touch your breasts. Wear a tight bra or sports bra and use ice until the fullness goes away. This usually takes 2 to 3 days. · Put ice or a cold pack on your breast after nursing to reduce swelling and pain. Put a thin cloth between the ice and your skin. Activity · Eat a balanced diet. Do not try to lose weight by cutting calories. Keep taking your prenatal vitamins, or take a multivitamin. · Get as much rest as you can. Try to take naps when your baby sleeps during the day. · Get some exercise every day. But do not do any heavy exercise until your doctor says it is okay. · Wait until you are healed (about 4 to 6 weeks) before you have sexual intercourse. Your doctor will tell you when it is okay to have sex. · Talk to your doctor about birth control. You can get pregnant even before your period returns. Also, you can get pregnant while you are breast-feeding. Mental Health · Many women get the \"baby blues\" during the first few days after childbirth. You may lose sleep, feel irritable, and cry easily.  You may feel happy one minute and sad the next. Hormone changes are one cause of these emotional changes. Also, the demands of a new baby, along with visits from relatives or other family needs, add to a mother's stress. The \"baby blues\" often peak around the fourth day. Then they ease up in less than 2 weeks. · If your moodiness or anxiety lasts for more than 2 weeks, or if you feel like life is not worth living, you may have postpartum depression. This is different for each mother. Some mothers with serious depression may worry intensely about their infant's well-being. Others may feel distant from their child. Some mothers might even feel that they might harm their baby. A mother may have signs of paranoia, wondering if someone is watching her. · With all the changes in your life, you may not know if you are depressed. Pregnancy sometimes causes changes in how you feel that are similar to the symptoms of depression. · Symptoms of depression include: · Feeling sad or hopeless and losing interest in daily activities. These are the most common symptoms of depression. · Sleeping too much or not enough. · Feeling tired. You may feel as if you have no energy. · Eating too much or too little. · POSTPARTUM SUPPORT INTERNATIONAL (PSI) offers a Warm line; Chat with the Expert phone sessions; Information and Articles about Pregnancy and Postpartum Mood Disorders; Comprehensive List of Free Support Groups; Knowledgeable local coordinators who will offer support, information, and resources; Guide to Resources on [employer]; Calendar of events in the  mood disorders community; Latest News and Research; and [facility] [address] for [employer]. Remember - You are not alone; You are not to blame; With help, you will be well. 9-237-563-PPD(5029). WWW. POSTPARTUM. NET · Writing or talking about death, such as writing suicide notes or talking about guns, knives, or pills.  Keep the numbers for these national suicide hotlines: 6-102-400-TALK (8-793.257.4874) and 3-080-TLFLHMR (2-352.598.4985). If you or someone you know talks about suicide or feeling hopeless, get help right away. Constipation and Hemorrhoids · Drink plenty of fluids, enough so that your urine is light yellow or clear like water. If you have kidney, heart, or liver disease and have to limit fluids, talk with your doctor before you increase the amount of fluids you drink. · Eat plenty of fiber each day. Have a bran muffin or bran cereal for breakfast, and try eating a piece of fruit for a mid-afternoon snack. · For painful, itchy hemorrhoids, put ice or a cold pack on the area several times a day for 10 minutes at a time. Follow this by putting a warm compress on the area for another 10 to 20 minutes or by sitting in a shallow, warm bath. When should you call for help? Call 911 anytime you think you may need emergency care. For example, call if: 
· You are thinking of hurting yourself, your baby, or anyone else. · You passed out (lost consciousness). · You have symptoms of a blood clot in your lung (called a pulmonary embolism). These may include:   
· Sudden chest pain. · Trouble breathing. · Coughing up blood. Call your doctor now or seek immediate medical care if: 
· You have severe vaginal bleeding. · You are soaking through a pad each hour for 2 or more hours. · Your vaginal bleeding seems to be getting heavier or is still bright red 4 days after delivery. · You are dizzy or lightheaded, or you feel like you may faint. · You are vomiting or cannot keep fluids down. · You have a fever. · You have new or more belly pain. · You pass tissue (not just blood). · Your vaginal discharge smells bad. · Your belly feels tender or full and hard. · Your breasts are continuously painful or red. · You feel sad, anxious, or hopeless for more than a few days. · You have sudden, severe pain in your belly. · You have symptoms of a blood clot in your leg (called a deep vein thrombosis),  
       such as: 
· Pain in your calf, back of the knee, thigh, or groin. · Redness and swelling in your leg or groin. · You have symptoms of preeclampsia, such as: 
· Sudden swelling of your face, hands, or feet. · New vision problems (such as dimness or blurring). · A severe headache. · Your blood pressure is higher than it should be or rises suddenly. · You have new nausea or vomiting. Watch closely for changes in your health, and be sure to contact your doctor if you have any problems. Additional Information:  Learning About Hypertensive Disorders After Childbirth What is preeclampsia? A woman with preeclampsia has blood pressure that is higher than usual. She may also have other serious symptoms. Preeclampsia can be dangerous. When it is severe, it can cause seizures (eclampsia) or liver or kidney damage. When the liver is affected, some women get HELLP syndrome, a blood-clotting and bleeding problem. HELLP can come on quickly and can be deadly. This is why your doctor checks you and your baby often. Preeclampsia usually occurs after 20 weeks of pregnancy. In rare cases, it is first noted right after childbirth. Most often, it starts near the end of pregnancy and goes away after childbirth. What are the symptoms? Mild preeclampsia usually doesn't cause symptoms. But preeclampsia can cause rapid weight gain and sudden swelling of the hands and face. Severe preeclampsia does cause symptoms. It can cause a very bad headache and trouble seeing and breathing. It also can cause belly pain. You may also urinate less than usual. 
 
If you have new preeclampsia symptoms after you go home from the hospital, call your doctor right away. What can you expect after you have had preeclampsia?  
 
In the hospital 
After the baby and the placenta are delivered, preeclampsia usually starts to improve. Most women get better in the first few days after childbirth. After having preeclampsia, you still have a risk of seizures for a day or more after childbirth. (Very rarely, seizures happen later on.) So your doctor may have you take magnesium sulfate for a day or more to prevent seizures. You may also take medicine to lower your blood pressure. When you go home Your blood pressure will most likely return to normal a few days after delivery. Your doctor will want to check your blood pressure sometime in the first week after you leave the hospital. 
 
Some women still have high blood pressure 6 weeks after childbirth. But most return to normal levels over the long term. · Take and record your blood pressure at home if your doctor tells you to. · Learn the importance of the two measures of blood pressure (such as 120 over 80, or 120/80). The first number is the systolic pressure. This is the force of blood on the artery walls as the heart pumps. The second number is the diastolic pressure. This is the force of blood on the artery walls between heartbeats, when the heart is at rest. You have a choice of monitors to use. Manual monitor: You pump up the cuff and use a stethoscope to listen for your  Pulse. · Electronic monitor: The cuff inflates, and a gauge shows your pulse rate. · To take your blood pressure: · Ask your doctor to check your blood pressure monitor to be sure that it is accurate and that the cuff fits you. Also ask your doctor to watch you use it, to make sure that you are using it right. · You should not eat, use tobacco products, or use medicine known to raise blood pressure (such as some nasal decongestant sprays) before you take your blood pressure. · Avoid taking your blood pressure if you have just exercised or are nervous or upset. Rest at least 15 minutes before you take your blood pressure. · Be safe with medicines.  If you take medicine, take it exactly as prescribed. Call your doctor if you think you are having a problem with your medicine. · Do not smoke. Quitting smoking will help lower your blood pressure and improve your baby's growth and health. If you need help quitting, talk to your doctor about stop-smoking programs and medicines. These can increase your chances of quitting for good. · Eat a balanced and healthy diet that has lots of fruits and vegetables. Long-term health After you have had preeclampsia, you have a higher-than-average risk of heart disease, stroke, and kidney disease. This may be because the same things that cause preeclampsia also cause heart and kidney disease. To protect your health, work with your doctor on living a heart-healthy lifestyle and getting the checkups you need. Your doctor may also want you to check your blood pressure at home. Follow-up care is a key part of your treatment and safety. Be sure to make and go to all appointments, and call your doctor if you are having problems. It's also a good idea to know your test results and keep a list of the medicines you take. Postpartum Support PARENTS:  Are you feeling sad or depressed? Is it difficult for you to enjoy yourself? Do you feel more irritable or tense? Do you feel anxious or panicky? Are you having difficulty bonding with your baby? Do you feel as if you are \"out of control\" or \"going crazy\"? Are you worried that you might hurt your baby or yourself? FAMILIES: Do you worry that something is wrong but don't know how to help? Do you think that your partner or spouse is having problems coping? Are you worried that it may never get better? While many women experience some mild mood change or \"the blues\" during or after the birth of a child, 1 in 9 women experience more significant symptoms of depression or anxiety. 1 in 10 Dads become depressed during the first year. Things you can do Being a good parent includes taking care of yourself. If you take care of yourself, you will be able to take better care of your baby and your family. · Talk to a counselor or healthcare provider who has training in  mood and anxiety problems. · Learn as much as you can about pregnancy and postpartum depression and anxiety. · Get support from family and friends. Ask for help when you need it. · Join a support group in your area or online. · Keep active by walking, stretching or whatever form of exercise helps you to feel better. · Get enough rest and time for yourself. · Eat a healthy diet. · Don't give up! It may take more than one try to get the right help you need. These are general instructions for a healthy lifestyle: No smoking/ No tobacco products/ Avoid exposure to second hand smoke Surgeon General's Warning:  Quitting smoking now greatly reduces serious risk to your health. Obesity, smoking, and sedentary lifestyle greatly increases your risk for illness A healthy diet, regular physical exercise & weight monitoring are important for maintaining a healthy lifestyle Recognize signs and symptoms of STROKE: 
 
F-face looks uneven A-arms unable to move or move unevenly S-speech slurred or non-existent T-time-call 911 as soon as signs and symptoms begin - DO NOT go  
    back to bed or wait to see if you get better - TIME IS BRAIN. I have had the opportunity to make my options or choices for discharge. I have received and understand these instructions. Chart Review Routing History No Routing History on File

## 2017-06-04 LAB
ABO + RH BLD: NORMAL
ALBUMIN SERPL BCP-MCNC: 2.3 G/DL (ref 3.5–5)
ALBUMIN SERPL BCP-MCNC: 2.4 G/DL (ref 3.5–5)
ALBUMIN/GLOB SERPL: 0.6 {RATIO} (ref 1.1–2.2)
ALBUMIN/GLOB SERPL: 0.6 {RATIO} (ref 1.1–2.2)
ALP SERPL-CCNC: 185 U/L (ref 45–117)
ALP SERPL-CCNC: 199 U/L (ref 45–117)
ALT SERPL-CCNC: 348 U/L (ref 12–78)
ALT SERPL-CCNC: 453 U/L (ref 12–78)
ANION GAP BLD CALC-SCNC: 11 MMOL/L (ref 5–15)
ANION GAP BLD CALC-SCNC: 13 MMOL/L (ref 5–15)
APTT PPP: 24.3 SEC (ref 22.1–32.5)
AST SERPL W P-5'-P-CCNC: 219 U/L (ref 15–37)
AST SERPL W P-5'-P-CCNC: 296 U/L (ref 15–37)
BILIRUB SERPL-MCNC: 0.4 MG/DL (ref 0.2–1)
BILIRUB SERPL-MCNC: 0.5 MG/DL (ref 0.2–1)
BLOOD GROUP ANTIBODIES SERPL: NORMAL
BUN SERPL-MCNC: 10 MG/DL (ref 6–20)
BUN SERPL-MCNC: 11 MG/DL (ref 6–20)
BUN/CREAT SERPL: 13 (ref 12–20)
BUN/CREAT SERPL: 14 (ref 12–20)
CALCIUM SERPL-MCNC: 9.3 MG/DL (ref 8.5–10.1)
CALCIUM SERPL-MCNC: 9.6 MG/DL (ref 8.5–10.1)
CHLORIDE SERPL-SCNC: 107 MMOL/L (ref 97–108)
CHLORIDE SERPL-SCNC: 108 MMOL/L (ref 97–108)
CO2 SERPL-SCNC: 17 MMOL/L (ref 21–32)
CO2 SERPL-SCNC: 19 MMOL/L (ref 21–32)
CREAT SERPL-MCNC: 0.72 MG/DL (ref 0.55–1.02)
CREAT SERPL-MCNC: 0.87 MG/DL (ref 0.55–1.02)
ERYTHROCYTE [DISTWIDTH] IN BLOOD BY AUTOMATED COUNT: 13.7 % (ref 11.5–14.5)
ERYTHROCYTE [DISTWIDTH] IN BLOOD BY AUTOMATED COUNT: 13.9 % (ref 11.5–14.5)
FIBRINOGEN PPP-MCNC: 364 MG/DL (ref 200–475)
GLOBULIN SER CALC-MCNC: 4.1 G/DL (ref 2–4)
GLOBULIN SER CALC-MCNC: 4.3 G/DL (ref 2–4)
GLUCOSE SERPL-MCNC: 110 MG/DL (ref 65–100)
GLUCOSE SERPL-MCNC: 112 MG/DL (ref 65–100)
HCT VFR BLD AUTO: 34.5 % (ref 35–47)
HCT VFR BLD AUTO: 35.2 % (ref 35–47)
HGB BLD-MCNC: 11.5 G/DL (ref 11.5–16)
HGB BLD-MCNC: 12.1 G/DL (ref 11.5–16)
INR PPP: 0.9 (ref 0.9–1.1)
MCH RBC QN AUTO: 29.6 PG (ref 26–34)
MCH RBC QN AUTO: 29.9 PG (ref 26–34)
MCHC RBC AUTO-ENTMCNC: 33.3 G/DL (ref 30–36.5)
MCHC RBC AUTO-ENTMCNC: 34.4 G/DL (ref 30–36.5)
MCV RBC AUTO: 86.9 FL (ref 80–99)
MCV RBC AUTO: 88.7 FL (ref 80–99)
PLATELET # BLD AUTO: 219 K/UL (ref 150–400)
PLATELET # BLD AUTO: 219 K/UL (ref 150–400)
POTASSIUM SERPL-SCNC: 3.5 MMOL/L (ref 3.5–5.1)
POTASSIUM SERPL-SCNC: 3.9 MMOL/L (ref 3.5–5.1)
PROT SERPL-MCNC: 6.4 G/DL (ref 6.4–8.2)
PROT SERPL-MCNC: 6.7 G/DL (ref 6.4–8.2)
PROTHROMBIN TIME: 9.7 SEC (ref 9–11.1)
RBC # BLD AUTO: 3.89 M/UL (ref 3.8–5.2)
RBC # BLD AUTO: 4.05 M/UL (ref 3.8–5.2)
SODIUM SERPL-SCNC: 136 MMOL/L (ref 136–145)
SODIUM SERPL-SCNC: 139 MMOL/L (ref 136–145)
SPECIMEN EXP DATE BLD: NORMAL
THERAPEUTIC RANGE,PTTT: NORMAL SECS (ref 58–77)
URATE SERPL-MCNC: 6.8 MG/DL (ref 2.6–6)
URATE SERPL-MCNC: 6.8 MG/DL (ref 2.6–6)
WBC # BLD AUTO: 16.2 K/UL (ref 3.6–11)
WBC # BLD AUTO: 16.7 K/UL (ref 3.6–11)

## 2017-06-04 PROCEDURE — 36415 COLL VENOUS BLD VENIPUNCTURE: CPT | Performed by: OBSTETRICS & GYNECOLOGY

## 2017-06-04 PROCEDURE — 84550 ASSAY OF BLOOD/URIC ACID: CPT | Performed by: OBSTETRICS & GYNECOLOGY

## 2017-06-04 PROCEDURE — 65410000002 HC RM PRIVATE OB

## 2017-06-04 PROCEDURE — 80053 COMPREHEN METABOLIC PANEL: CPT | Performed by: OBSTETRICS & GYNECOLOGY

## 2017-06-04 PROCEDURE — 59025 FETAL NON-STRESS TEST: CPT

## 2017-06-04 PROCEDURE — 85027 COMPLETE CBC AUTOMATED: CPT | Performed by: OBSTETRICS & GYNECOLOGY

## 2017-06-04 PROCEDURE — 80074 ACUTE HEPATITIS PANEL: CPT | Performed by: OBSTETRICS & GYNECOLOGY

## 2017-06-04 PROCEDURE — 85610 PROTHROMBIN TIME: CPT | Performed by: SPECIALIST

## 2017-06-04 PROCEDURE — 74011250637 HC RX REV CODE- 250/637: Performed by: OBSTETRICS & GYNECOLOGY

## 2017-06-04 RX ADMIN — URSODIOL 500 MG: 500 TABLET, FILM COATED ORAL at 18:41

## 2017-06-04 RX ADMIN — ZOLPIDEM TARTRATE 5 MG: 5 TABLET ORAL at 21:05

## 2017-06-04 RX ADMIN — URSODIOL 500 MG: 500 TABLET, FILM COATED ORAL at 08:33

## 2017-06-04 RX ADMIN — URSODIOL 500 MG: 500 TABLET, FILM COATED ORAL at 12:01

## 2017-06-04 RX ADMIN — FAMOTIDINE 20 MG: 20 TABLET, FILM COATED ORAL at 17:26

## 2017-06-04 RX ADMIN — Medication 1 TABLET: at 08:33

## 2017-06-04 NOTE — CONSULTS
High Risk Consult Note    Chief Complaint:  Pregnancy and itching. History of Present Illness: 28 y.o.  female at 33w5d gestation. Estimated Date of Delivery: 7/18/17     Pregnancy has been complicated by diagnosis of cholestasis on 5/30, with bile acids of 19 and itching. Started on Actigall and symptoms improved. Thierry Pontiff Her LFTs at that time were mildly increased. She was given steroids, and serial labs were followed and LFTs increased to ,  last night, slight increase to 348/219 this morning. Normal platelets, over 939 on every check and stable. BPs in normal to mild pre-e range (all under 160/105, most 140s/80s). 24 hour urine 303 mg protein. Creat 0.94 on admission but now down to 0.72. No headache, visual symptoms, RUQ or epigastric pain, vomiting. A little dizziness on standing, and some mild nausea on waking this morning, now resolved. Says she \"felt lousy\" on admission but had gotten little sleep the night before due to itching, and she feels much better now that she has rested, and itching has improved. Some pedal edema but has had this since ~20 weeks and not any worse now. Overall says she feels well. Fetal movement a little less this week but still normal. Irregular non-painful contractions. No bleeding or loss of fluid. No recent viral illness or travel. No known exposure to hepatitis. No IVDA. ROS:  A comprehensive review of systems was negative except for that written in the HPI. Past Medical History:   Diagnosis Date    Preeclampsia 6/3/2017     Past Surgical History:   Procedure Laterality Date    HX OTHER SURGICAL       Social History     Social History    Marital status:      Spouse name: N/A    Number of children: N/A    Years of education: N/A     Occupational History    Not on file.      Social History Main Topics    Smoking status: Never Smoker    Smokeless tobacco: Not on file    Alcohol use Yes      Comment: 2-3 glasses per week prior to pregnancy    Drug use: No    Sexual activity: Yes     Partners: Male     Other Topics Concern    Not on file     Social History Narrative     Family History   Problem Relation Age of Onset    Hypertension Mother     Hypertension Father     Cancer Father      Allergies   Allergen Reactions    Pcn [Penicillins] Unknown (comments)         Vitals:  Temp (24hrs), Av.3 °F (36.8 °C), Min:98.2 °F (36.8 °C), Max:98.4 °F (36.9 °C)   Patient Vitals for the past 24 hrs:   BP   17 0836 147/79   17 0438 128/79   17 0020 142/83   17 2141 (!) 140/93   17 131/78   17 1941 145/84   17 1931 150/78   17 1920 146/88   17 1919 135/77   17 1911 158/90   17 1901 147/81   17 1844 123/72   17 1836 134/70   17 1828 150/89   17 1812 (!) 149/92   17 1811 (!) 151/101       I&O:   07 -  1900  In: 850 [P.O.:850]  Out: 1500 [Urine:1500]                 Exam:  Patient without distress. Abdomen: soft, non-tender               Fundus: soft and non tender               Right Upper Quadrant: non-tender               Lower Extremity Edema: 2+               Patellar Reflexes: 2+ bilaterally     Cervical Exam: Dilation (cm): 0 Eff: 0 % per previous exam                                Uterine Activity: Frequency (min): 2-4 Intensity: Mild                                 Membranes: Membrane Status: Intact                              Fetal Heart Rate: Mode: ExternalFetal Heart Rate: 150        Lab/Data Review:  Lab results reviewed. For significant abnormal values and values requiring intervention, see assessment and plan. Ultrasound: Storm fetus in cephalic presentation. Anterior placenta, no previa. EFW 2287g/5#1oz, 47th percentile. BPP 8/8. PRANAV 11.9 cm. UA S/D 2.35. Normal limited review of anatomy    Assessment and Plan:     Active Problems:    Preeclampsia (6/3/2017)        33 5/7 week IUP with elevated LFTs and cholestasis, now with increased (mild range) BPs and minimally increeased proteinuria. It is unclear if the etiology of the increased LFTs is due to cholestasis or if this is developing atypical HELLP (normal platelets). Normally growing fetus with normal fluid and UA Dopplers and reassuring testing. Patient without symptoms. Recommend serial labs. Repeat bile salts are pending and expected back tomorrow - if there has been a dramatic increase in bile salts, this would be supportive of cholestasis as an etiology. If the LFTs continue to rise with stabilization or decrease in bile salts, would lean more towards pre-e. Recommend continued inpatient care. Repeat labs this evening. Watch BPs, if they trend up or if platelets decrease substantially, would consider moving towards delivery. Hepatitis panel pending - given normal bili, doubt hepatitis but certainly worth checking to rule out non-pregnancy etiology.

## 2017-06-04 NOTE — PROGRESS NOTES
Progress Note  Patient seen, fetal heart rate  evaluated, patient examined. Called by nurse earlier b/c pt awoke from a nap c/o epigastric pain and not feeling well in general.  Pt got Pepcid and had a snack and reports now feeling significantly improved. Nursing also agrees she appears to be back to her baseline. Patient Vitals for the past 2 hrs:   BP Pulse   06/04/17 1749 143/87 83       Physical Exam:  Abd:  Soft, gravid, NT. No RUQ TTP  Uterine Activity: None  Fetal Heart Rate: Reactive (on NST now)  Variability: moderate  Accelerations: yes  Decelerations: none    Assessment/Plan:  IUP @ 33w5d with cholestasis and possible superimposed pre-eclampsia. Reviewed this evening's labs. LFTs continue to creep up but have not doubled since yesterday. Plts remain stable in 200s. Creatinine up slightly from this AM but is less than yesterday. BPs unchanged. Overall, her clinical status has not changed. Will continue to observe closely, repeat labs in AM.  Plan and pt's status discussed with pt and family.     Jacky Fitzgerald MD

## 2017-06-04 NOTE — PROGRESS NOTES
TRANSFER - IN REPORT:    Verbal report received from Shirley Briones RN(name) on [facility]  being received from L&D(unit) for routine progression of care      Report consisted of patients Situation, Background, Assessment and   Recommendations(SBAR). Information from the following report(s) SBAR was reviewed with the receiving nurse. Opportunity for questions and clarification was provided. Assessment completed upon patients arrival to unit and care assumed. 0450: Labs drawn and sent to lab. [id number]: Hourly rounds completed. [id number]: Hourly rounds completed.

## 2017-06-04 NOTE — PROGRESS NOTES
Bedside shift change report given to Blanca Goldman RN (oncoming nurse) by Kamilla Josue RN (offgoing nurse). Report included the following information SBAR, Kardex and MAR.     0745 at bedside report, pt states she hasn't felt baby move much this morning and requested to listen to heart tones. Baby is tachycardic so will leave pt on fetal heart monitor for NST at this time. 9733 call placed to Dr Leeanna Miramontes to inform her pt is derrick regularly every 3-4 minutes without pain. Left message on voicemail for her to call unit. 0930 Dr Leeanna Miramontes at bedside. Will consult with MFM and NICU this afternoon. Labs ordered. Plan of care discussed. 1000 hepatitis panel drawn and sent to lab.    1100 Dr Jeanne Britt at bedside, ultrasound performed, growth and fluid are good. Will continue to monitor pt for signs and symptoms of Preeclampsia. 8687 metabolic panel resulted with increase in LFT's and uric acid, results called to Dr Kathleen Balderas, also informed her of pt having epigastric pain and overall not feeling well. She will come to see pt on unit shortly.     1845 CBC w/o diff sent to lab

## 2017-06-04 NOTE — PROGRESS NOTES
1940 - Bedside and Verbal shift change report given to CESAR Ervin RNC (oncoming nurse) by Carmina Dickinson RNC (offgoing nurse). Report included the following information SBAR, Kardex, Recent Results and Med Rec Status. 2018 - Dr Uriel Waggoner on Unit; aware that lab results are still pending (recently sent CBC, [facility] labs, etc). 2103 - Dr Uriel Waggoner at bedside for the past 15 min., discussing plan of care with pt and . Dr Uriel Waggoner recommends that pt stay in the hospital overnight and be seen by MFM tomorrow, as well as a NICU consult, because liver enzymes have increased (ALT and AST have almost doubled). Still awaiting 24 hr urine result. 2133 - Dr Uriel Waggoner at bedside to explain 24 hr urine results with pt and  (303). 0020 - TRANSFER - OUT REPORT:    Verbal report given to [facility] ALIA RN(name) on [facility]  being transferred to [facility] Rm 315(unit) for routine progression of care       Report consisted of patients Situation, Background, Assessment and   Recommendations(SBAR). Information from the following report(s) SBAR, Kardex, Intake/Output, MAR, Accordion, Recent Results and Med Rec Status was reviewed with the receiving nurse. Lines:       Opportunity for questions and clarification was provided. Patient transported with:   Registered Nurse, via wheelchair, accompanied by . All belongings accounted for.

## 2017-06-04 NOTE — CONSULTS
Neonatology Antepartum Consultation    Name: Liliana Calix      Medical Record Number: [id number]      YOB: 1990     Today's Date: 2017                                                                 Date of Consultation:  2017  Time: 6:15 PM  Attending MD:  Norma THOMAS/Carmella  Referring Physician:  Dr. Neida Rashid  Reason for Consultation:   33 5/7 weeks gestation. Mother with cholestasis and worsening bile salts and LFT's.   Now with concerns for pre eclampsia/possible HELLP though platelets normal.     Subjective:     Age: 28 y.o.  Woody Matte  Para:   Gestation age: 32w6d      Maternal steroids:   and 6/3   Objective:     Medications:   Current Facility-Administered Medications   Medication Dose Route Frequency    sodium chloride (NS) flush 5-10 mL  5-10 mL IntraVENous Q8H    sodium chloride (NS) flush 5-10 mL  5-10 mL IntraVENous PRN    ondansetron (ZOFRAN ODT) tablet 4 mg  4 mg Oral Q8H PRN    diphenhydrAMINE (BENADRYL) capsule 25 mg  25 mg Oral Q4H PRN    zolpidem (AMBIEN) tablet 5 mg  5 mg Oral QHS PRN    docusate sodium (COLACE) capsule 100 mg  100 mg Oral BID PRN    famotidine (PEPCID) tablet 20 mg  20 mg Oral Q12H PRN    prenatal vit-iron fumarate-fa (PRENATAL PLUS with IRON) tablet 1 Tab  1 Tab Oral DAILY    sodium chloride (OCEAN) 0.65 % nasal spray 2 Spray  2 Spray Both Nostrils PRN    HYDROmorphone (DILAUDID) tablet 2 mg  2 mg Oral Q4H PRN    ursodiol (ACTIGALL) tablet 500 mg  500 mg Oral TID WITH MEALS     Rupture of Membrane: Membranes  Membrane Status: Intact   Meconium Stained:       Data Review:  Lab:   Lab Results   Component Value Date/Time    ABO/Rh(D) O POSITIVE 2017 09:42 PM    Antibody screen NEG 2017 09:42 PM    Rubella, External immune 2016    HBsAg, External negative 2016    HIV, External negative 2016    RPR, External non reactive 2016    ABO,Rh o positive 2016    Antibody screen, External negative 12/07/2016     Last Ultrasound: 6/4, xx, normal anatomy, BPP 8/8  Last Estimated Fetal Weight: 2287 (5# 1oz)    Assessment:  29 YO G1 mother. Admitted to L&D with concerns for cholestasis and now concerns for pre eclampisa with increasing LFT's, HTN, and proteinuria      Active Problems:    Preeclampsia (6/3/2017)        OB Concerns/Plan:     Approximate survival statistics in overall population at this Gestation Age 33w5d   [x]   Explained limitation of statistics to parents    Common problems at this Gestation Age: 33w5d:  RDS, infection, feeding issues, jaundice, discharge criteria     However, not limited to the above. [x]    Explained NICU coverage and team approach  [x]    Answered question  [x]    Offered tour  []    Obtained consents  [x]    Discussed Benefits of Breast Feeding (mother plans to breast feed but is open to supplementation with formula as needed)   []    Discussed the Parent Progress note    Chart review 10 minutes, face to face time 20 minutes, charting 10 minutes  Thank you for the consult!     [facility]    Date:    6/4/2017  Time:   [id number]

## 2017-06-04 NOTE — H&P
History & Physical    Name: Joya Cancer MRN: [id number]  SSN: [id number]    YOB: 1990  Age: 28 y.o. Sex: female      Subjective:     Reason for Admission:  Pregnancy and elevated blood pressures    History of Present Illness: Ms. Ramesh Walls is a 28 y.o.  female with an estimated gestational age of 26w1d with Estimated Date of Delivery: 17. Patient complains of feeling better today compared to last night. Was briefly SOB/fatigued with housechores/getting ready this morning but then felt fine rest of the day. Was able to get better rest last night. Patient denies ha/bv/n/v/abd pain. Intermittent FM today - normal for her for this pregnancy. Pt has had one week of itching - bile acids from  were elevated at 19 and LFTs mildly abnormal at that visit. Pt presented yesterday not feeling well with increase in LFTs and mildly elevated BPs. Pt wanted discharge home and returned tonight for BP check and 2nd betamethasone. BPs persistently elevated now and LFTs have subsequently doubled. OB History    Para Term  AB SAB TAB Ectopic Multiple Living   1               # Outcome Date GA Lbr Leroy/2nd Weight Sex Delivery Anes PTL Lv   1 Current                 History reviewed. No pertinent past medical history. Past Surgical History:   Procedure Laterality Date    HX OTHER SURGICAL       Social History     Occupational History    Not on file. Social History Main Topics    Smoking status: Never Smoker    Smokeless tobacco: Not on file    Alcohol use Yes      Comment: 2-3 glasses per week prior to pregnancy    Drug use: No    Sexual activity: Yes     Partners: Male      Family History   Problem Relation Age of Onset    Hypertension Mother     Hypertension Father     Cancer Father        Allergies   Allergen Reactions    Pcn [Penicillins] Unknown (comments)     Prior to Admission medications    Medication Sig Start Date End Date Taking?  Authorizing Provider   ursodiol (ACTIGALL) 500 mg tablet Take 500 mg by mouth two (2) times a day. Indications: CHOLELITHIASIS PREVENTION   Yes Historical Provider   PRENATAL VIT37/IRON/FOLIC ACID (PRENATA PO) Take  by mouth. Yes Phys MD Nataly        Review of Systems:  A comprehensive review of systems was negative except for that written in the History of Present Illness. Objective:     Vitals:    Vitals:    17 1919 17 1920 17 19317   BP: 135/77 146/88 150/78 145/84   Pulse: 88 83 76 85   Resp:       Temp:       Weight:       Height:          Temp (24hrs), Av.2 °F (36.8 °C), Min:98.2 °F (36.8 °C), Max:98.2 °F (36.8 °C)    BP  Min: 123/72  Max: 158/90     Physical Exam:  Patient without distress.   Heart: Regular rate and rhythm  Lung: clear to auscultation throughout lung fields, no wheezes, no rales, no rhonchi and normal respiratory effort  Abdomen: soft, nontender  Fundus: soft and non tender   Lower Extremities: 2+ edema     Membranes:  Intact  Uterine Activity:  occasional  Fetal Heart Rate:  150s mod +accels no decels  DTRs: 3+ brisk       Lab/Data Review:  Recent Results (from the past 24 hour(s))   CBC W/O DIFF    Collection Time: 17  8:04 PM   Result Value Ref Range    WBC 15.2 (H) 3.6 - 11.0 K/uL    RBC 4.03 3.80 - 5.20 M/uL    HGB 12.2 11.5 - 16.0 g/dL    HCT 35.3 35.0 - 47.0 %    MCV 87.6 80.0 - 99.0 FL    MCH 30.3 26.0 - 34.0 PG    MCHC 34.6 30.0 - 36.5 g/dL    RDW 13.6 11.5 - 14.5 %    PLATELET 103 137 - 730 K/uL   METABOLIC PANEL, COMPREHENSIVE    Collection Time: 17  8:04 PM   Result Value Ref Range    Sodium 140 136 - 145 mmol/L    Potassium 3.9 3.5 - 5.1 mmol/L    Chloride 106 97 - 108 mmol/L    CO2 23 21 - 32 mmol/L    Anion gap 11 5 - 15 mmol/L    Glucose 107 (H) 65 - 100 mg/dL    BUN 9 6 - 20 MG/DL    Creatinine 0.94 0.55 - 1.02 MG/DL    BUN/Creatinine ratio 10 (L) 12 - 20      GFR est AA >60 >60 ml/min/1.73m2    GFR est non-AA >60 >60 ml/min/1.73m2 Calcium 9.3 8.5 - 10.1 MG/DL    Bilirubin, total 0.4 0.2 - 1.0 MG/DL    ALT (SGPT) 310 (H) 12 - 78 U/L    AST (SGOT) 189 (H) 15 - 37 U/L    Alk. phosphatase 216 (H) 45 - 117 U/L    Protein, total 6.3 (L) 6.4 - 8.2 g/dL    Albumin 2.6 (L) 3.5 - 5.0 g/dL    Globulin 3.7 2.0 - 4.0 g/dL    A-G Ratio 0.7 (L) 1.1 - 2.2     LD    Collection Time: 17  8:04 PM   Result Value Ref Range     81 - 246 U/L   URIC ACID    Collection Time: 17  8:04 PM   Result Value Ref Range    Uric acid 5.7 2.6 - 6.0 MG/DL     24 hour urine collection 303mg    Assessment and Plan: Active Problems:    * No active hospital problems. *     32yo  at 33w4d with cholestasis (by symptoms and bile acids - repeat bile acids pending from ) and now clinical picture consistent with preeclampsia. BPs mild range not requiring treatment. Cholestasis symptoms improved with ursodiol however LFTs have doubled since yesterday. 24 hour urine protein resulted now 303. Recommend pt stay inpatient at this time. Will repeat labs tomorrow. MFM, NICU consults tomorrow. GBS sent now. DVT ppx. NST reactive. BID NST.     Signed By:  Reg Da Silva MD     Michelle 3, 2017

## 2017-06-04 NOTE — PROGRESS NOTES
High Risk Obstetrics Progress Note    Name: Emely Rogers MRN: [id number]  SSN: [id number]    YOB: 1990  Age: 28 y.o. Sex: female      Subjective:      LOS: 0 days    Estimated Date of Delivery: 17   Gestational Age Today: 33w5d     Patient admitted for elevated BPs, rising LFTs. States she has had decreased FM in general over past week but has felt her baby move this morning. Some nicol marmolejo ctx but describes more as \"her pushing out and having hiccups\" than pain. No VB/LOF. No headache/blurry vision/n/v/abd pain. Winded when first getting up in the morning but no SOB/CP. Objective:     Vitals:  Blood pressure 147/79, pulse 100, temperature 98.4 °F (36.9 °C), resp. rate 16, height 5' 4\" (1.626 m), weight 86.9 kg (191 lb 8 oz), not currently breastfeeding. Temp (24hrs), Av.3 °F (36.8 °C), Min:98.2 °F (36.8 °C), Max:98.4 °F (63.2 °C)    Systolic (89WSK), QAP:461 , Min:123 , EGK:662      Diastolic (97IWB), XOJ:03, Min:70, Max:101       Intake and Output:   No intake or output data in the 24 hours ending 17 0929      Physical Exam:  Patient without distress.   Heart: Regular rate and rhythm  Lung: clear to auscultation throughout lung fields, no wheezes, no rales, no rhonchi and normal respiratory effort  Abdomen: soft, nontender  Fundus: soft and non tender  Perineum: blood absent, amniotic fluid absent  Cervical Exam: closed/long/posterior  Lower Extremities: 2+ edema; DTRs 3+ brisk       Membranes:  Intact    Uterine Activity:  Non painful ctx every 3 minutes    Fetal Heart Rate:  150s mod +accel no decels        Labs:   Recent Results (from the past 36 hour(s))   CREATININE CLEARANCE    Collection Time: 17  6:40 PM   Result Value Ref Range    Creatinine Clearance 137 (H) 88 - 128 mL/min    Creatinine 0.94 0.55 - 1.02 MG/DL    Volume 4325 mL    Period of collection 24 hr   CBC W/O DIFF    Collection Time: 17  8:04 PM   Result Value Ref Range    WBC 15.2 (H) 3.6 - 11.0 K/uL    RBC 4.03 3.80 - 5.20 M/uL    HGB 12.2 11.5 - 16.0 g/dL    HCT 35.3 35.0 - 47.0 %    MCV 87.6 80.0 - 99.0 FL    MCH 30.3 26.0 - 34.0 PG    MCHC 34.6 30.0 - 36.5 g/dL    RDW 13.6 11.5 - 14.5 %    PLATELET 111 555 - 970 K/uL   METABOLIC PANEL, COMPREHENSIVE    Collection Time: 06/03/17  8:04 PM   Result Value Ref Range    Sodium 140 136 - 145 mmol/L    Potassium 3.9 3.5 - 5.1 mmol/L    Chloride 106 97 - 108 mmol/L    CO2 23 21 - 32 mmol/L    Anion gap 11 5 - 15 mmol/L    Glucose 107 (H) 65 - 100 mg/dL    BUN 9 6 - 20 MG/DL    Creatinine 0.94 0.55 - 1.02 MG/DL    BUN/Creatinine ratio 10 (L) 12 - 20      GFR est AA >60 >60 ml/min/1.73m2    GFR est non-AA >60 >60 ml/min/1.73m2    Calcium 9.3 8.5 - 10.1 MG/DL    Bilirubin, total 0.4 0.2 - 1.0 MG/DL    ALT (SGPT) 310 (H) 12 - 78 U/L    AST (SGOT) 189 (H) 15 - 37 U/L    Alk.  phosphatase 216 (H) 45 - 117 U/L    Protein, total 6.3 (L) 6.4 - 8.2 g/dL    Albumin 2.6 (L) 3.5 - 5.0 g/dL    Globulin 3.7 2.0 - 4.0 g/dL    A-G Ratio 0.7 (L) 1.1 - 2.2     LD    Collection Time: 06/03/17  8:04 PM   Result Value Ref Range     81 - 246 U/L   URIC ACID    Collection Time: 06/03/17  8:04 PM   Result Value Ref Range    Uric acid 5.7 2.6 - 6.0 MG/DL   TYPE & SCREEN    Collection Time: 06/03/17  9:42 PM   Result Value Ref Range    Crossmatch Expiration 06/06/2017     ABO/Rh(D) O POSITIVE     Antibody screen NEG    METABOLIC PANEL, COMPREHENSIVE    Collection Time: 06/04/17  4:47 AM   Result Value Ref Range    Sodium 136 136 - 145 mmol/L    Potassium 3.9 3.5 - 5.1 mmol/L    Chloride 108 97 - 108 mmol/L    CO2 17 (L) 21 - 32 mmol/L    Anion gap 11 5 - 15 mmol/L    Glucose 112 (H) 65 - 100 mg/dL    BUN 10 6 - 20 MG/DL    Creatinine 0.72 0.55 - 1.02 MG/DL    BUN/Creatinine ratio 14 12 - 20      GFR est AA >60 >60 ml/min/1.73m2    GFR est non-AA >60 >60 ml/min/1.73m2    Calcium 9.6 8.5 - 10.1 MG/DL    Bilirubin, total 0.5 0.2 - 1.0 MG/DL    ALT (SGPT) 348 (H) 12 - 78 U/L AST (SGOT) 219 (H) 15 - 37 U/L    Alk. phosphatase 199 (H) 45 - 117 U/L    Protein, total 6.7 6.4 - 8.2 g/dL    Albumin 2.4 (L) 3.5 - 5.0 g/dL    Globulin 4.3 (H) 2.0 - 4.0 g/dL    A-G Ratio 0.6 (L) 1.1 - 2.2     CBC W/O DIFF    Collection Time: 17  4:47 AM   Result Value Ref Range    WBC 16.2 (H) 3.6 - 11.0 K/uL    RBC 4.05 3.80 - 5.20 M/uL    HGB 12.1 11.5 - 16.0 g/dL    HCT 35.2 35.0 - 47.0 %    MCV 86.9 80.0 - 99.0 FL    MCH 29.9 26.0 - 34.0 PG    MCHC 34.4 30.0 - 36.5 g/dL    RDW 13.7 11.5 - 14.5 %    PLATELET 944 990 - 659 K/uL       Assessment and Plan: Active Problems:    Preeclampsia (6/3/2017)       30yo  at 33w5d admitted with cholestasis, preeclampsia. LFTs elevated further this morning. Pt asymptomatic but overall has had less FM this week. NST reassuring and at this time ordered for BID. No s/sx of PTL - pt to eat/hydrate now and if notices further ctx will reevaluate. Discussed if FM is abnormal will transfer pt back to L&D for continuous monitoring. Hepatitis panel ordered now. Spoke with M Dr. Junito Ott this morning who will be in to evaluate pt as well. Repeat labs ordered for this evening. Plan for NICU consult today. Continue DVT ppx. GBS is pending. Rest of prenatal care has been benign until this past week. Repeat bile acids pending from Friday.     Signed By: Chloe Mccormick MD     2017

## 2017-06-05 LAB
ALBUMIN SERPL BCP-MCNC: 2.2 G/DL (ref 3.5–5)
ALBUMIN/GLOB SERPL: 0.6 {RATIO} (ref 1.1–2.2)
ALP SERPL-CCNC: 171 U/L (ref 45–117)
ALT SERPL-CCNC: 448 U/L (ref 12–78)
ANION GAP BLD CALC-SCNC: 10 MMOL/L (ref 5–15)
AST SERPL W P-5'-P-CCNC: 262 U/L (ref 15–37)
BILE AC SERPL-SCNC: 141.5 UMOL/L (ref 4.7–24.5)
BILIRUB SERPL-MCNC: 0.4 MG/DL (ref 0.2–1)
BUN SERPL-MCNC: 11 MG/DL (ref 6–20)
BUN/CREAT SERPL: 13 (ref 12–20)
CALCIUM SERPL-MCNC: 9 MG/DL (ref 8.5–10.1)
CHLORIDE SERPL-SCNC: 110 MMOL/L (ref 97–108)
CO2 SERPL-SCNC: 20 MMOL/L (ref 21–32)
CREAT SERPL-MCNC: 0.83 MG/DL (ref 0.55–1.02)
ERYTHROCYTE [DISTWIDTH] IN BLOOD BY AUTOMATED COUNT: 13.8 % (ref 11.5–14.5)
GLOBULIN SER CALC-MCNC: 3.8 G/DL (ref 2–4)
GLUCOSE SERPL-MCNC: 95 MG/DL (ref 65–100)
HAV IGM SERPL QL IA: NONREACTIVE
HBV CORE IGM SER QL: NONREACTIVE
HBV SURFACE AG SER QL: <0.1 INDEX
HBV SURFACE AG SER QL: NEGATIVE
HCT VFR BLD AUTO: 31.8 % (ref 35–47)
HCV AB SERPL QL IA: NONREACTIVE
HCV COMMENT,HCGAC: NORMAL
HGB BLD-MCNC: 10.9 G/DL (ref 11.5–16)
MCH RBC QN AUTO: 30.6 PG (ref 26–34)
MCHC RBC AUTO-ENTMCNC: 34.3 G/DL (ref 30–36.5)
MCV RBC AUTO: 89.3 FL (ref 80–99)
PLATELET # BLD AUTO: 187 K/UL (ref 150–400)
POTASSIUM SERPL-SCNC: 3.8 MMOL/L (ref 3.5–5.1)
PROT SERPL-MCNC: 6 G/DL (ref 6.4–8.2)
RBC # BLD AUTO: 3.56 M/UL (ref 3.8–5.2)
SODIUM SERPL-SCNC: 140 MMOL/L (ref 136–145)
SP1: NORMAL
SP2: NORMAL
SP3: NORMAL
URATE SERPL-MCNC: 6.8 MG/DL (ref 2.6–6)
WBC # BLD AUTO: 13.4 K/UL (ref 3.6–11)

## 2017-06-05 PROCEDURE — 84550 ASSAY OF BLOOD/URIC ACID: CPT | Performed by: OBSTETRICS & GYNECOLOGY

## 2017-06-05 PROCEDURE — 74011250637 HC RX REV CODE- 250/637: Performed by: OBSTETRICS & GYNECOLOGY

## 2017-06-05 PROCEDURE — 82239 BILE ACIDS TOTAL: CPT | Performed by: OBSTETRICS & GYNECOLOGY

## 2017-06-05 PROCEDURE — 65410000002 HC RM PRIVATE OB

## 2017-06-05 PROCEDURE — 85027 COMPLETE CBC AUTOMATED: CPT | Performed by: OBSTETRICS & GYNECOLOGY

## 2017-06-05 PROCEDURE — 36415 COLL VENOUS BLD VENIPUNCTURE: CPT | Performed by: OBSTETRICS & GYNECOLOGY

## 2017-06-05 PROCEDURE — 80053 COMPREHEN METABOLIC PANEL: CPT | Performed by: OBSTETRICS & GYNECOLOGY

## 2017-06-05 PROCEDURE — 59025 FETAL NON-STRESS TEST: CPT

## 2017-06-05 RX ADMIN — Medication 1 TABLET: at 09:00

## 2017-06-05 RX ADMIN — ZOLPIDEM TARTRATE 5 MG: 5 TABLET ORAL at 21:46

## 2017-06-05 RX ADMIN — URSODIOL 500 MG: 500 TABLET, FILM COATED ORAL at 21:28

## 2017-06-05 RX ADMIN — URSODIOL 500 MG: 500 TABLET, FILM COATED ORAL at 17:41

## 2017-06-05 RX ADMIN — URSODIOL 500 MG: 500 TABLET, FILM COATED ORAL at 08:00

## 2017-06-05 NOTE — PROGRESS NOTES
High Risk Obstetrics Progress Note    Name: Arnulfo Arias MRN: [id number]  SSN: [id number]    YOB: 1990  Age: 28 y.o. Sex: female      Subjective:      LOS: 1 day    Estimated Date of Delivery: 17   Gestational Age Today: 32w10d     Patient admitted for cholestasis of pregnancy and possible evolving pre-eclampsia. States she does have normal fetal movement and does not have chest pain, contractions, headache , right upper quadrant pain  , shortness of breath, vaginal bleeding , vaginal leaking of fluid  and visual disturbances. Pt reports feeling well. Itching resolved on Actigall. Objective:     Vitals:  Blood pressure 113/71, pulse 69, temperature 98.3 °F (36.8 °C), resp. rate 16, height 5' 4\" (1.626 m), weight 86.9 kg (191 lb 8 oz), not currently breastfeeding. Temp (24hrs), Av.1 °F (36.7 °C), Min:97.8 °F (36.6 °C), Max:98.3 °F (86.3 °C)    Systolic (06WNX), IUC:321 , Min:113 , ZOH:499      Diastolic (87KWW), FIJ:71, Min:71, Max:88       Intake and Output:          Physical Exam:  Patient without distress.   Heart: Regular rate and rhythm  Lung: clear to auscultation throughout lung fields, no wheezes, no rales, no rhonchi and normal respiratory effort  Abdomen: soft, nontender, gravid, no ruq tenderness to palpation  Lower Extremities:  - Edema 1+   No calf tenderness; 1/4 dtr    Membranes:  Intact    Uterine Activity:  rare    Fetal Heart Rate:  Reactive  Baseline: 150s per minute  Variability: moderate  Accelerations: yes  Decelerations: none        Labs:   Recent Results (from the past 36 hour(s))   CBC W/O DIFF    Collection Time: 17  8:04 PM   Result Value Ref Range    WBC 15.2 (H) 3.6 - 11.0 K/uL    RBC 4.03 3.80 - 5.20 M/uL    HGB 12.2 11.5 - 16.0 g/dL    HCT 35.3 35.0 - 47.0 %    MCV 87.6 80.0 - 99.0 FL    MCH 30.3 26.0 - 34.0 PG    MCHC 34.6 30.0 - 36.5 g/dL    RDW 13.6 11.5 - 14.5 %    PLATELET 580 054 - 827 K/uL   METABOLIC PANEL, COMPREHENSIVE    Collection Time: 06/03/17  8:04 PM   Result Value Ref Range    Sodium 140 136 - 145 mmol/L    Potassium 3.9 3.5 - 5.1 mmol/L    Chloride 106 97 - 108 mmol/L    CO2 23 21 - 32 mmol/L    Anion gap 11 5 - 15 mmol/L    Glucose 107 (H) 65 - 100 mg/dL    BUN 9 6 - 20 MG/DL    Creatinine 0.94 0.55 - 1.02 MG/DL    BUN/Creatinine ratio 10 (L) 12 - 20      GFR est AA >60 >60 ml/min/1.73m2    GFR est non-AA >60 >60 ml/min/1.73m2    Calcium 9.3 8.5 - 10.1 MG/DL    Bilirubin, total 0.4 0.2 - 1.0 MG/DL    ALT (SGPT) 310 (H) 12 - 78 U/L    AST (SGOT) 189 (H) 15 - 37 U/L    Alk. phosphatase 216 (H) 45 - 117 U/L    Protein, total 6.3 (L) 6.4 - 8.2 g/dL    Albumin 2.6 (L) 3.5 - 5.0 g/dL    Globulin 3.7 2.0 - 4.0 g/dL    A-G Ratio 0.7 (L) 1.1 - 2.2     LD    Collection Time: 06/03/17  8:04 PM   Result Value Ref Range     81 - 246 U/L   URIC ACID    Collection Time: 06/03/17  8:04 PM   Result Value Ref Range    Uric acid 5.7 2.6 - 6.0 MG/DL   CULTURE, GENITAL GROUP B STREP    Collection Time: 06/03/17  9:42 PM   Result Value Ref Range    Special Requests: NO SPECIAL REQUESTS      Culture result: NO GROUP B BETA STREPTOCOCCUS ISOLATED  , SO FAR       TYPE & SCREEN    Collection Time: 06/03/17  9:42 PM   Result Value Ref Range    Crossmatch Expiration 06/06/2017     ABO/Rh(D) O POSITIVE     Antibody screen NEG    METABOLIC PANEL, COMPREHENSIVE    Collection Time: 06/04/17  4:47 AM   Result Value Ref Range    Sodium 136 136 - 145 mmol/L    Potassium 3.9 3.5 - 5.1 mmol/L    Chloride 108 97 - 108 mmol/L    CO2 17 (L) 21 - 32 mmol/L    Anion gap 11 5 - 15 mmol/L    Glucose 112 (H) 65 - 100 mg/dL    BUN 10 6 - 20 MG/DL    Creatinine 0.72 0.55 - 1.02 MG/DL    BUN/Creatinine ratio 14 12 - 20      GFR est AA >60 >60 ml/min/1.73m2    GFR est non-AA >60 >60 ml/min/1.73m2    Calcium 9.6 8.5 - 10.1 MG/DL    Bilirubin, total 0.5 0.2 - 1.0 MG/DL    ALT (SGPT) 348 (H) 12 - 78 U/L    AST (SGOT) 219 (H) 15 - 37 U/L    Alk.  phosphatase 199 (H) 45 - 117 U/L Protein, total 6.7 6.4 - 8.2 g/dL    Albumin 2.4 (L) 3.5 - 5.0 g/dL    Globulin 4.3 (H) 2.0 - 4.0 g/dL    A-G Ratio 0.6 (L) 1.1 - 2.2     CBC W/O DIFF    Collection Time: 06/04/17  4:47 AM   Result Value Ref Range    WBC 16.2 (H) 3.6 - 11.0 K/uL    RBC 4.05 3.80 - 5.20 M/uL    HGB 12.1 11.5 - 16.0 g/dL    HCT 35.2 35.0 - 47.0 %    MCV 86.9 80.0 - 99.0 FL    MCH 29.9 26.0 - 34.0 PG    MCHC 34.4 30.0 - 36.5 g/dL    RDW 13.7 11.5 - 14.5 %    PLATELET 546 223 - 970 K/uL   METABOLIC PANEL, COMPREHENSIVE    Collection Time: 06/04/17  4:51 PM   Result Value Ref Range    Sodium 139 136 - 145 mmol/L    Potassium 3.5 3.5 - 5.1 mmol/L    Chloride 107 97 - 108 mmol/L    CO2 19 (L) 21 - 32 mmol/L    Anion gap 13 5 - 15 mmol/L    Glucose 110 (H) 65 - 100 mg/dL    BUN 11 6 - 20 MG/DL    Creatinine 0.87 0.55 - 1.02 MG/DL    BUN/Creatinine ratio 13 12 - 20      GFR est AA >60 >60 ml/min/1.73m2    GFR est non-AA >60 >60 ml/min/1.73m2    Calcium 9.3 8.5 - 10.1 MG/DL    Bilirubin, total 0.4 0.2 - 1.0 MG/DL    ALT (SGPT) 453 (H) 12 - 78 U/L    AST (SGOT) 296 (H) 15 - 37 U/L    Alk.  phosphatase 185 (H) 45 - 117 U/L    Protein, total 6.4 6.4 - 8.2 g/dL    Albumin 2.3 (L) 3.5 - 5.0 g/dL    Globulin 4.1 (H) 2.0 - 4.0 g/dL    A-G Ratio 0.6 (L) 1.1 - 2.2     URIC ACID    Collection Time: 06/04/17  4:51 PM   Result Value Ref Range    Uric acid 6.8 (H) 2.6 - 6.0 MG/DL   URIC ACID    Collection Time: 06/04/17  4:51 PM   Result Value Ref Range    Uric acid 6.8 (H) 2.6 - 6.0 MG/DL   COAGULATION SCREEN    Collection Time: 06/04/17  5:44 PM   Result Value Ref Range    INR 0.9 0.9 - 1.1      Prothrombin time 9.7 9.0 - 11.1 sec    aPTT 24.3 22.1 - 32.5 sec    aPTT, therapeutic range     58.0 - 77.0 SECS    Fibrinogen 364 200 - 475 mg/dL   CBC W/O DIFF    Collection Time: 06/04/17  6:37 PM   Result Value Ref Range    WBC 16.7 (H) 3.6 - 11.0 K/uL    RBC 3.89 3.80 - 5.20 M/uL    HGB 11.5 11.5 - 16.0 g/dL    HCT 34.5 (L) 35.0 - 47.0 %    MCV 88.7 80.0 - 99.0 FL    MCH 29.6 26.0 - 34.0 PG    MCHC 33.3 30.0 - 36.5 g/dL    RDW 13.9 11.5 - 14.5 %    PLATELET 232 169 - 608 K/uL   CBC W/O DIFF    Collection Time: 06/05/17  4:00 AM   Result Value Ref Range    WBC 13.4 (H) 3.6 - 11.0 K/uL    RBC 3.56 (L) 3.80 - 5.20 M/uL    HGB 10.9 (L) 11.5 - 16.0 g/dL    HCT 31.8 (L) 35.0 - 47.0 %    MCV 89.3 80.0 - 99.0 FL    MCH 30.6 26.0 - 34.0 PG    MCHC 34.3 30.0 - 36.5 g/dL    RDW 13.8 11.5 - 14.5 %    PLATELET 734 468 - 609 K/uL   METABOLIC PANEL, COMPREHENSIVE    Collection Time: 06/05/17  4:00 AM   Result Value Ref Range    Sodium 140 136 - 145 mmol/L    Potassium 3.8 3.5 - 5.1 mmol/L    Chloride 110 (H) 97 - 108 mmol/L    CO2 20 (L) 21 - 32 mmol/L    Anion gap 10 5 - 15 mmol/L    Glucose 95 65 - 100 mg/dL    BUN 11 6 - 20 MG/DL    Creatinine 0.83 0.55 - 1.02 MG/DL    BUN/Creatinine ratio 13 12 - 20      GFR est AA >60 >60 ml/min/1.73m2    GFR est non-AA >60 >60 ml/min/1.73m2    Calcium 9.0 8.5 - 10.1 MG/DL    Bilirubin, total 0.4 0.2 - 1.0 MG/DL    ALT (SGPT) 448 (H) 12 - 78 U/L    AST (SGOT) 262 (H) 15 - 37 U/L    Alk. phosphatase 171 (H) 45 - 117 U/L    Protein, total 6.0 (L) 6.4 - 8.2 g/dL    Albumin 2.2 (L) 3.5 - 5.0 g/dL    Globulin 3.8 2.0 - 4.0 g/dL    A-G Ratio 0.6 (L) 1.1 - 2.2     URIC ACID    Collection Time: 06/05/17  4:00 AM   Result Value Ref Range    Uric acid 6.8 (H) 2.6 - 6.0 MG/DL       Assessment and Plan: Active Problems:    Preeclampsia (6/3/2017)       IUP @ 33w6d with cholestasis and possible superimposed pre-eclampsia. LFTs stable this morning. Platelets with slight drop to the 180s. Creatinine stable. BPs normal to mild range. Clinically, pt feels better.   Will continue to observe closely, repeat labs in AM.     Signed By: Jon Rodriguez DO     June 5, 2017

## 2017-06-05 NOTE — PROGRESS NOTES
Bedside and Verbal shift change report given to HUMAIRA Madrid RN (oncoming nurse) by HUMAIRA Jacobson RN (offgoing nurse). Report included the following information SBAR, MAR and Recent Results. Hourly rounds were completed on this patient [phone].

## 2017-06-05 NOTE — PROGRESS NOTES
Bedside shift change report given to [address] [place] [address] (oncoming nurse) by CONCHITA Calderon RN (offgoing nurse). Report included the following information SBAR, Procedure Summary, Intake/Output, MAR and Recent Results. Hourly rounds made [phone].

## 2017-06-05 NOTE — PROGRESS NOTES
Bedside and Verbal shift change report given to HUMAIRA Holden RN by HUMAIRA Real RN . Report included the following information SBAR, Kardex and MAR.

## 2017-06-06 LAB
ALBUMIN SERPL BCP-MCNC: 2.2 G/DL (ref 3.5–5)
ALBUMIN/GLOB SERPL: 0.6 {RATIO} (ref 1.1–2.2)
ALP SERPL-CCNC: 179 U/L (ref 45–117)
ALT SERPL-CCNC: 355 U/L (ref 12–78)
ANION GAP BLD CALC-SCNC: 10 MMOL/L (ref 5–15)
AST SERPL W P-5'-P-CCNC: 134 U/L (ref 15–37)
BACTERIA SPEC CULT: NORMAL
BILE AC SERPL-SCNC: 50.2 UMOL/L (ref 4.7–24.5)
BILIRUB SERPL-MCNC: 0.4 MG/DL (ref 0.2–1)
BUN SERPL-MCNC: 9 MG/DL (ref 6–20)
BUN/CREAT SERPL: 12 (ref 12–20)
CALCIUM SERPL-MCNC: 8.7 MG/DL (ref 8.5–10.1)
CHLORIDE SERPL-SCNC: 108 MMOL/L (ref 97–108)
CO2 SERPL-SCNC: 22 MMOL/L (ref 21–32)
CREAT SERPL-MCNC: 0.77 MG/DL (ref 0.55–1.02)
ERYTHROCYTE [DISTWIDTH] IN BLOOD BY AUTOMATED COUNT: 13.7 % (ref 11.5–14.5)
GLOBULIN SER CALC-MCNC: 3.9 G/DL (ref 2–4)
GLUCOSE SERPL-MCNC: 91 MG/DL (ref 65–100)
HCT VFR BLD AUTO: 32.9 % (ref 35–47)
HGB BLD-MCNC: 11.1 G/DL (ref 11.5–16)
MCH RBC QN AUTO: 30 PG (ref 26–34)
MCHC RBC AUTO-ENTMCNC: 33.7 G/DL (ref 30–36.5)
MCV RBC AUTO: 88.9 FL (ref 80–99)
PLATELET # BLD AUTO: 177 K/UL (ref 150–400)
POTASSIUM SERPL-SCNC: 4 MMOL/L (ref 3.5–5.1)
PROT SERPL-MCNC: 6.1 G/DL (ref 6.4–8.2)
RBC # BLD AUTO: 3.7 M/UL (ref 3.8–5.2)
SERVICE CMNT-IMP: NORMAL
SODIUM SERPL-SCNC: 140 MMOL/L (ref 136–145)
WBC # BLD AUTO: 13.2 K/UL (ref 3.6–11)

## 2017-06-06 PROCEDURE — 80053 COMPREHEN METABOLIC PANEL: CPT | Performed by: OBSTETRICS & GYNECOLOGY

## 2017-06-06 PROCEDURE — 36415 COLL VENOUS BLD VENIPUNCTURE: CPT | Performed by: OBSTETRICS & GYNECOLOGY

## 2017-06-06 PROCEDURE — 59025 FETAL NON-STRESS TEST: CPT

## 2017-06-06 PROCEDURE — 85027 COMPLETE CBC AUTOMATED: CPT | Performed by: OBSTETRICS & GYNECOLOGY

## 2017-06-06 PROCEDURE — 74011250637 HC RX REV CODE- 250/637: Performed by: OBSTETRICS & GYNECOLOGY

## 2017-06-06 PROCEDURE — 65410000002 HC RM PRIVATE OB

## 2017-06-06 RX ORDER — SUMATRIPTAN 25 MG/1
25 TABLET, FILM COATED ORAL
Status: DISCONTINUED | OUTPATIENT
Start: 2017-06-06 | End: 2017-06-08

## 2017-06-06 RX ADMIN — URSODIOL 500 MG: 500 TABLET, FILM COATED ORAL at 09:55

## 2017-06-06 RX ADMIN — Medication 1 TABLET: at 09:55

## 2017-06-06 RX ADMIN — FAMOTIDINE 20 MG: 20 TABLET, FILM COATED ORAL at 19:34

## 2017-06-06 RX ADMIN — ZOLPIDEM TARTRATE 5 MG: 5 TABLET ORAL at 21:58

## 2017-06-06 RX ADMIN — URSODIOL 500 MG: 500 TABLET, FILM COATED ORAL at 12:30

## 2017-06-06 RX ADMIN — URSODIOL 500 MG: 500 TABLET, FILM COATED ORAL at 19:34

## 2017-06-06 NOTE — PROGRESS NOTES
AntePart[facility] Progress Note    Adalid Aquino  34w0d    Patient admitted for cholestasis and possible eclampsia 34w0d Estimated Date of Delivery: 17 states she does have  normal fetal movement. Feels a bit \"groggy\" from being in bed. Had a mild HA yesterday which resolved spontaneously with food. No HA or visual changes currently. Vitals:  Patient Vitals for the past 24 hrs:   BP Temp Pulse Resp   17 0514 142/84 98.5 °F (36.9 °C) 84 16   17 2055 (!) 145/93 98.2 °F (36.8 °C) 65 18   17 1700 138/86 97.6 °F (36.4 °C) 73 14   17 0837 134/86 97.9 °F (36.6 °C) 69 16     Temp (24hrs), Av.1 °F (36.7 °C), Min:97.6 °F (36.4 °C), Max:98.5 °F (36.9 °C)    I&O:                  1901 -  0700  In: 5799 [P.O.:4170]  Out: 3600 [Urine:3600]  NST:  Reactive  Uterine Activity: None    Exam:  Patient without distress. Abdomen soft, non-tender               Fundus soft and non tender               Lower extremities edema 1+                                           Labs:   Recent Results (from the past 24 hour(s))   METABOLIC PANEL, COMPREHENSIVE    Collection Time: 17  5:07 AM   Result Value Ref Range    Sodium 140 136 - 145 mmol/L    Potassium 4.0 3.5 - 5.1 mmol/L    Chloride 108 97 - 108 mmol/L    CO2 22 21 - 32 mmol/L    Anion gap 10 5 - 15 mmol/L    Glucose 91 65 - 100 mg/dL    BUN 9 6 - 20 MG/DL    Creatinine 0.77 0.55 - 1.02 MG/DL    BUN/Creatinine ratio 12 12 - 20      GFR est AA >60 >60 ml/min/1.73m2    GFR est non-AA >60 >60 ml/min/1.73m2    Calcium 8.7 8.5 - 10.1 MG/DL    Bilirubin, total 0.4 0.2 - 1.0 MG/DL    ALT (SGPT) 355 (H) 12 - 78 U/L    AST (SGOT) 134 (H) 15 - 37 U/L    Alk.  phosphatase 179 (H) 45 - 117 U/L    Protein, total 6.1 (L) 6.4 - 8.2 g/dL    Albumin 2.2 (L) 3.5 - 5.0 g/dL    Globulin 3.9 2.0 - 4.0 g/dL    A-G Ratio 0.6 (L) 1.1 - 2.2     CBC W/O DIFF    Collection Time: 17  5:07 AM   Result Value Ref Range    WBC 13.2 (H) 3.6 - 11.0 K/uL    RBC 3.70 (L) 3.80 - 5.20 M/uL    HGB 11.1 (L) 11.5 - 16.0 g/dL    HCT 32.9 (L) 35.0 - 47.0 %    MCV 88.9 80.0 - 99.0 FL    MCH 30.0 26.0 - 34.0 PG    MCHC 33.7 30.0 - 36.5 g/dL    RDW 13.7 11.5 - 14.5 %    PLATELET 391 774 - 719 K/uL       Assessment and Plan:   IUP @ 34w0d   Patient Active Problem List    Diagnosis Date Noted    Preeclampsia 06/03/2017   IUP @ 34w0d with cholestasis and possible superimposed pre-eclampsia. LFTs stable/slightly improved this morning. Platelets with slight drop but still NL. Creatinine stable/slightly improved. BPs normal to mild range and unchanged. Will continue to observe closely, repeat labs in AM.  Will get her on schedule with [facility] for weekly BPPs (last done this weekend).   GBS cx today.

## 2017-06-06 NOTE — PROGRESS NOTES
Bedside shift change report given to KIM Emanuel RN (oncoming nurse) by Emmanuel Rodriguez RN (offgoing nurse). Report included the following information SBAR, MAR, Accordion and Recent Results.        Hourly rounds complete: [id number], [id number], [id number]

## 2017-06-06 NOTE — PROGRESS NOTES
Bedside and Verbal shift change report given to HUMAIRA Staton RN by David Polk. Adelfo AYOUB. Report included the following information SBAR, Kardex and MAR. 1030 pt states she is having back discomfort with the bed, requested a different bed. 1045 pt's bed changed out. Pt states she is much happier on this bed.     1400 Pt requesting wheel chair priv off floor. Order rec'd from Dr. Krunal Khan pt states she is having an aura prior to a migraine starting. Reviewed with pt that she has a history of migraines, and takes excedrin for them. Encouraged pt to return to bed,     1920 coke brought to pt for caffiene, family at bedside with dinner, states the visual changes are still present no pain at this time however  /99 while sitting up in bed. Pt had been sitting up in chair prior to bp taken. 1950 spoke to Dr Dottie Rivas by phone to discuss pt's concerns about meds for migraine. Immitrex, ordered for 1x prn.

## 2017-06-07 LAB
ALBUMIN SERPL BCP-MCNC: 2.2 G/DL (ref 3.5–5)
ALBUMIN/GLOB SERPL: 0.6 {RATIO} (ref 1.1–2.2)
ALP SERPL-CCNC: 188 U/L (ref 45–117)
ALT SERPL-CCNC: 256 U/L (ref 12–78)
ANION GAP BLD CALC-SCNC: 10 MMOL/L (ref 5–15)
AST SERPL W P-5'-P-CCNC: 73 U/L (ref 15–37)
BILIRUB SERPL-MCNC: 0.4 MG/DL (ref 0.2–1)
BUN SERPL-MCNC: 11 MG/DL (ref 6–20)
BUN/CREAT SERPL: 14 (ref 12–20)
CALCIUM SERPL-MCNC: 8.8 MG/DL (ref 8.5–10.1)
CHLORIDE SERPL-SCNC: 107 MMOL/L (ref 97–108)
CO2 SERPL-SCNC: 22 MMOL/L (ref 21–32)
CREAT SERPL-MCNC: 0.8 MG/DL (ref 0.55–1.02)
ERYTHROCYTE [DISTWIDTH] IN BLOOD BY AUTOMATED COUNT: 13.7 % (ref 11.5–14.5)
GLOBULIN SER CALC-MCNC: 3.8 G/DL (ref 2–4)
GLUCOSE SERPL-MCNC: 90 MG/DL (ref 65–100)
HCT VFR BLD AUTO: 34.5 % (ref 35–47)
HGB BLD-MCNC: 11.6 G/DL (ref 11.5–16)
MCH RBC QN AUTO: 29.7 PG (ref 26–34)
MCHC RBC AUTO-ENTMCNC: 33.6 G/DL (ref 30–36.5)
MCV RBC AUTO: 88.2 FL (ref 80–99)
PLATELET # BLD AUTO: 185 K/UL (ref 150–400)
POTASSIUM SERPL-SCNC: 3.8 MMOL/L (ref 3.5–5.1)
PROT SERPL-MCNC: 6 G/DL (ref 6.4–8.2)
RBC # BLD AUTO: 3.91 M/UL (ref 3.8–5.2)
SODIUM SERPL-SCNC: 139 MMOL/L (ref 136–145)
WBC # BLD AUTO: 12.5 K/UL (ref 3.6–11)

## 2017-06-07 PROCEDURE — 80053 COMPREHEN METABOLIC PANEL: CPT | Performed by: OBSTETRICS & GYNECOLOGY

## 2017-06-07 PROCEDURE — 36415 COLL VENOUS BLD VENIPUNCTURE: CPT | Performed by: OBSTETRICS & GYNECOLOGY

## 2017-06-07 PROCEDURE — 74011250637 HC RX REV CODE- 250/637: Performed by: OBSTETRICS & GYNECOLOGY

## 2017-06-07 PROCEDURE — 85027 COMPLETE CBC AUTOMATED: CPT | Performed by: OBSTETRICS & GYNECOLOGY

## 2017-06-07 PROCEDURE — 59025 FETAL NON-STRESS TEST: CPT

## 2017-06-07 PROCEDURE — 65410000002 HC RM PRIVATE OB

## 2017-06-07 RX ADMIN — URSODIOL 500 MG: 500 TABLET, FILM COATED ORAL at 13:04

## 2017-06-07 RX ADMIN — URSODIOL 500 MG: 500 TABLET, FILM COATED ORAL at 17:16

## 2017-06-07 RX ADMIN — URSODIOL 500 MG: 500 TABLET, FILM COATED ORAL at 08:45

## 2017-06-07 RX ADMIN — Medication 1 TABLET: at 09:23

## 2017-06-07 NOTE — PROGRESS NOTES
: Bedside shift change report given to Adair Huerta (oncoming nurse) by Coco Joyce (offgoing nurse). Report included the following information SBAR, Kardex, Intake/Output, MAR and Recent Results. :  VSS. /102. Visual disturbances resolved. Pt rating HA 2/10.      0:  NST complete. Reactive. .    9012:  /93. Labs completed. Pt had uneventful night.     Hourly [id number], [phone], [phone]

## 2017-06-07 NOTE — PROGRESS NOTES
High Risk Obstetrics Progress Note    Name: Sarah Wang MRN: [id number]  SSN: [id number]    YOB: 1990  Age: 28 y.o. Sex: female      Subjective:      LOS: 3 days    Estimated Date of Delivery: 17   Gestational Age Today: 34w1d     Patient admitted for cholestasis. States she continues to have itching. Migraine last night but resolved with imitrex. No headache/blurry vision/nausea/vomiting/abdominal pain. Objective:     Vitals:  Blood pressure 148/87, pulse 73, temperature 97.8 °F (36.6 °C), resp. rate 16, height 5' 4\" (1.626 m), weight 86.9 kg (191 lb 8 oz), not currently breastfeeding. Temp (24hrs), Av °F (36.7 °C), Min:97.8 °F (36.6 °C), Max:98.2 °F (17.8 °C)    Systolic (82PLB), YCQ:201 , Min:136 , QDY:819      Diastolic (62AEH), HCZ:08, Min:87, Max:102       Intake and Output:       Intake/Output Summary (Last 24 hours) at 17 1253  Last data filed at 17 1927   Gross per 24 hour   Intake             3490 ml   Output             3550 ml   Net              -60 ml            Physical Exam:  Patient without distress. Abdomen: soft, nontender  Fundus: soft and non tender  Lower Extremities:  - Edema 1+       Membranes:  Intact    Uterine Activity:  None    Fetal Heart Rate:  140s mod +accels no decels        Labs:   Recent Results (from the past 36 hour(s))   METABOLIC PANEL, COMPREHENSIVE    Collection Time: 17  5:07 AM   Result Value Ref Range    Sodium 140 136 - 145 mmol/L    Potassium 4.0 3.5 - 5.1 mmol/L    Chloride 108 97 - 108 mmol/L    CO2 22 21 - 32 mmol/L    Anion gap 10 5 - 15 mmol/L    Glucose 91 65 - 100 mg/dL    BUN 9 6 - 20 MG/DL    Creatinine 0.77 0.55 - 1.02 MG/DL    BUN/Creatinine ratio 12 12 - 20      GFR est AA >60 >60 ml/min/1.73m2    GFR est non-AA >60 >60 ml/min/1.73m2    Calcium 8.7 8.5 - 10.1 MG/DL    Bilirubin, total 0.4 0.2 - 1.0 MG/DL    ALT (SGPT) 355 (H) 12 - 78 U/L    AST (SGOT) 134 (H) 15 - 37 U/L    Alk.  phosphatase 179 (H) 45 - 117 U/L    Protein, total 6.1 (L) 6.4 - 8.2 g/dL    Albumin 2.2 (L) 3.5 - 5.0 g/dL    Globulin 3.9 2.0 - 4.0 g/dL    A-G Ratio 0.6 (L) 1.1 - 2.2     CBC W/O DIFF    Collection Time: 17  5:07 AM   Result Value Ref Range    WBC 13.2 (H) 3.6 - 11.0 K/uL    RBC 3.70 (L) 3.80 - 5.20 M/uL    HGB 11.1 (L) 11.5 - 16.0 g/dL    HCT 32.9 (L) 35.0 - 47.0 %    MCV 88.9 80.0 - 99.0 FL    MCH 30.0 26.0 - 34.0 PG    MCHC 33.7 30.0 - 36.5 g/dL    RDW 13.7 11.5 - 14.5 %    PLATELET 126 501 - 431 K/uL   METABOLIC PANEL, COMPREHENSIVE    Collection Time: 17  6:04 AM   Result Value Ref Range    Sodium 139 136 - 145 mmol/L    Potassium 3.8 3.5 - 5.1 mmol/L    Chloride 107 97 - 108 mmol/L    CO2 22 21 - 32 mmol/L    Anion gap 10 5 - 15 mmol/L    Glucose 90 65 - 100 mg/dL    BUN 11 6 - 20 MG/DL    Creatinine 0.80 0.55 - 1.02 MG/DL    BUN/Creatinine ratio 14 12 - 20      GFR est AA >60 >60 ml/min/1.73m2    GFR est non-AA >60 >60 ml/min/1.73m2    Calcium 8.8 8.5 - 10.1 MG/DL    Bilirubin, total 0.4 0.2 - 1.0 MG/DL    ALT (SGPT) 256 (H) 12 - 78 U/L    AST (SGOT) 73 (H) 15 - 37 U/L    Alk. phosphatase 188 (H) 45 - 117 U/L    Protein, total 6.0 (L) 6.4 - 8.2 g/dL    Albumin 2.2 (L) 3.5 - 5.0 g/dL    Globulin 3.8 2.0 - 4.0 g/dL    A-G Ratio 0.6 (L) 1.1 - 2.2     CBC W/O DIFF    Collection Time: 17  6:04 AM   Result Value Ref Range    WBC 12.5 (H) 3.6 - 11.0 K/uL    RBC 3.91 3.80 - 5.20 M/uL    HGB 11.6 11.5 - 16.0 g/dL    HCT 34.5 (L) 35.0 - 47.0 %    MCV 88.2 80.0 - 99.0 FL    MCH 29.7 26.0 - 34.0 PG    MCHC 33.6 30.0 - 36.5 g/dL    RDW 13.7 11.5 - 14.5 %    PLATELET 482 306 - 886 K/uL       Assessment and Plan: Active Problems:    Preeclampsia (6/3/2017)        at 34w1d with cholestasis. LFTs and bile acids improving. Mild preeclampsia by BP elevation and 24 hour urine protein that was > 300 without worsening s/sx or evidence for HELLP. Continue inpatient management with daily labs.   BPs occasionally severe range but nothing requiring treatment. MFM f/u tomorrow - will discuss timing of delivery if otherwise remains clinically stable. GBS neg. NST reactive.     Signed By: Laura Alberts MD     June 7, 2017

## 2017-06-07 NOTE — PROGRESS NOTES
Verbal shift change report given to ABBY Lazo RN (oncoming nurse) by Maria Del Rosario Blanco RN (offgoing nurse). Report included the following information SBAR, Kardex, MAR and Recent Results. Asleep. 1250 Dr Octavio Dobbins to bedside. Labs ordered for tomorrow A.M. M appointment scheduled for tomorrow afternoon.

## 2017-06-07 NOTE — ADT AUTH CERT NOTES
Utilization Review           Hypertensive Disorders of Pregnancy - Care Day 5 (2017) by Manda Park RN        Review Status Review Entered       Completed 2017       Details              Care Day: 5 Care Date: 2017 Level of Care: Inpatient Floor       Guideline Day 2        Clinical Status       (X) * Blood pressure normal or adequately controlled       (X) * No seizure activity identified       ( ) * Laboratory values normal or improved       2017 1:10 PM EDT by Qian Stephen         LFTs remain elevated, plan for follow up with MFM tomorrow , AST 73, Alk phos 188, Total protein 6.0, Albumin 2.2              (X) * Fetal status acceptable       (X) * Delivery not indicated imminently       ( ) * Discharge plans and education understood              Activity       ( ) * Ambulatory       2017 1:10 PM EDT by Qian Stephen         bedrest                     Routes       (X) * Oral hydration, medications, and diet              Interventions       (X) Fetal monitoring, including daily fetal movements       2017 1:10 PM EDT by Qian Stephen         NSTs BID                                          * Milestone              Additional Notes       Patient admitted for cholestasis. States she continues to have itching. Migraine last night but resolved with imitrex. No headache/blurry vision/nausea/vomiting/abdominal pain.       T 97.8 P 73 RR 16 /87        , AST 73, Alk phos 188, Total protein 6.0, Albumin 2.2, WBC 12.5        at 34w1d with cholestasis. LFTs and bile acids improving. Mild preeclampsia by BP elevation and 24 hour urine protein that was > 300 without worsening s/sx or evidence for HELLP. Continue inpatient management with daily labs. BPs occasionally severe range but nothing requiring treatment. MFM f/u tomorrow - will discuss timing of delivery if otherwise remains clinically stable. GBS neg.  NST reactive.       Regular diet, daily weights, bedrest with bathroom privileges, NSTs BID           Hypertensive Disorders of Pregnancy - Care Day 4 (6/6/2017) by Juma Espinoza RN        Review Status Review Entered       Completed 6/6/2017       Details              Care Day: 4 Care Date: 6/6/2017 Level of Care: Inpatient Floor       Guideline Day 2        Clinical Status       (X) * Blood pressure normal or adequately controlled       (X) * No seizure activity identified       ( ) * Laboratory values normal or improved       6/6/2017 1:13 PM EDT by Claudell Faes         WBC 13.3, H/H 11.1/32.9, Total protein 6.1, Albumin 2.2, , , Alk phos 179              (X) * Fetal status acceptable       (X) * Delivery not indicated imminently       ( ) * Discharge plans and education understood       6/6/2017 1:13 PM EDT by Claudell Faes         plan to continue to closely monitor LFTs with labs in AM                     Activity       ( ) * Ambulatory       6/6/2017 1:13 PM EDT by Rj Romero with bathroom privileges                     Routes       (X) * Oral hydration, medications, and diet       6/6/2017 1:13 PM EDT by Claudell Faes         regular diet                     Interventions       (X) Fetal monitoring, including daily fetal movements       6/6/2017 1:13 PM EDT by Claudell Faes         NSTs BID                                          * Milestone              Additional Notes       Patient admitted for cholestasis and possible eclampsia 34w0d Estimated Date of Delivery: 7/18/17 states she does have normal fetal movement. Feels a bit \"groggy\" from being in bed. Had a mild HA yesterday which resolved spontaneously with food. No HA or visual changes currently       T 98.5 P 84 RR 16 /84       IUP @ 34w0d with cholestasis and possible superimposed pre-eclampsia. LFTs stable/slightly improved this morning. Platelets with slight drop but still NL. Creatinine stable/slightly improved.  BPs normal to mild range and unchanged. Will continue to observe closely, repeat labs in AM. Will get her on schedule with [facility] for weekly BPPs (last done this weekend).  GBS cx today.       WBC 13.3, H/H 11.1/32.9, Total protein 6.1, Albumin 2.2, , , Alk phos 179

## 2017-06-08 LAB
ABO + RH BLD: NORMAL
ALBUMIN SERPL BCP-MCNC: 2.3 G/DL (ref 3.5–5)
ALBUMIN SERPL BCP-MCNC: 2.7 G/DL (ref 3.5–5)
ALBUMIN/GLOB SERPL: 0.6 {RATIO} (ref 1.1–2.2)
ALBUMIN/GLOB SERPL: 0.7 {RATIO} (ref 1.1–2.2)
ALP SERPL-CCNC: 205 U/L (ref 45–117)
ALP SERPL-CCNC: 227 U/L (ref 45–117)
ALT SERPL-CCNC: 199 U/L (ref 12–78)
ALT SERPL-CCNC: 209 U/L (ref 12–78)
ANION GAP BLD CALC-SCNC: 13 MMOL/L (ref 5–15)
ANION GAP BLD CALC-SCNC: 9 MMOL/L (ref 5–15)
AST SERPL W P-5'-P-CCNC: 54 U/L (ref 15–37)
AST SERPL W P-5'-P-CCNC: 64 U/L (ref 15–37)
BASOPHILS # BLD AUTO: 0 K/UL (ref 0–0.1)
BASOPHILS # BLD: 0 % (ref 0–1)
BILIRUB SERPL-MCNC: 0.5 MG/DL (ref 0.2–1)
BILIRUB SERPL-MCNC: 0.5 MG/DL (ref 0.2–1)
BLOOD GROUP ANTIBODIES SERPL: NORMAL
BUN SERPL-MCNC: 10 MG/DL (ref 6–20)
BUN SERPL-MCNC: 11 MG/DL (ref 6–20)
BUN/CREAT SERPL: 12 (ref 12–20)
BUN/CREAT SERPL: 12 (ref 12–20)
CALCIUM SERPL-MCNC: 9 MG/DL (ref 8.5–10.1)
CALCIUM SERPL-MCNC: 9.6 MG/DL (ref 8.5–10.1)
CHLORIDE SERPL-SCNC: 104 MMOL/L (ref 97–108)
CHLORIDE SERPL-SCNC: 107 MMOL/L (ref 97–108)
CO2 SERPL-SCNC: 21 MMOL/L (ref 21–32)
CO2 SERPL-SCNC: 22 MMOL/L (ref 21–32)
CREAT SERPL-MCNC: 0.83 MG/DL (ref 0.55–1.02)
CREAT SERPL-MCNC: 0.91 MG/DL (ref 0.55–1.02)
EOSINOPHIL # BLD: 0.1 K/UL (ref 0–0.4)
EOSINOPHIL NFR BLD: 1 % (ref 0–7)
ERYTHROCYTE [DISTWIDTH] IN BLOOD BY AUTOMATED COUNT: 13.6 % (ref 11.5–14.5)
ERYTHROCYTE [DISTWIDTH] IN BLOOD BY AUTOMATED COUNT: 13.7 % (ref 11.5–14.5)
GLOBULIN SER CALC-MCNC: 3.9 G/DL (ref 2–4)
GLOBULIN SER CALC-MCNC: 4 G/DL (ref 2–4)
GLUCOSE SERPL-MCNC: 85 MG/DL (ref 65–100)
GLUCOSE SERPL-MCNC: 93 MG/DL (ref 65–100)
HCT VFR BLD AUTO: 35.1 % (ref 35–47)
HCT VFR BLD AUTO: 38.7 % (ref 35–47)
HGB BLD-MCNC: 12 G/DL (ref 11.5–16)
HGB BLD-MCNC: 13.4 G/DL (ref 11.5–16)
LYMPHOCYTES # BLD AUTO: 22 % (ref 12–49)
LYMPHOCYTES # BLD: 2.9 K/UL (ref 0.8–3.5)
MCH RBC QN AUTO: 29.9 PG (ref 26–34)
MCH RBC QN AUTO: 30.4 PG (ref 26–34)
MCHC RBC AUTO-ENTMCNC: 34.2 G/DL (ref 30–36.5)
MCHC RBC AUTO-ENTMCNC: 34.6 G/DL (ref 30–36.5)
MCV RBC AUTO: 87.3 FL (ref 80–99)
MCV RBC AUTO: 87.8 FL (ref 80–99)
MONOCYTES # BLD: 0.5 K/UL (ref 0–1)
MONOCYTES NFR BLD AUTO: 4 % (ref 5–13)
NEUTS SEG # BLD: 9.5 K/UL (ref 1.8–8)
NEUTS SEG NFR BLD AUTO: 73 % (ref 32–75)
PLATELET # BLD AUTO: 180 K/UL (ref 150–400)
PLATELET # BLD AUTO: 231 K/UL (ref 150–400)
POTASSIUM SERPL-SCNC: 3.9 MMOL/L (ref 3.5–5.1)
POTASSIUM SERPL-SCNC: 4.2 MMOL/L (ref 3.5–5.1)
PROT SERPL-MCNC: 6.3 G/DL (ref 6.4–8.2)
PROT SERPL-MCNC: 6.6 G/DL (ref 6.4–8.2)
RBC # BLD AUTO: 4.02 M/UL (ref 3.8–5.2)
RBC # BLD AUTO: 4.41 M/UL (ref 3.8–5.2)
SODIUM SERPL-SCNC: 138 MMOL/L (ref 136–145)
SODIUM SERPL-SCNC: 138 MMOL/L (ref 136–145)
SPECIMEN EXP DATE BLD: NORMAL
URATE SERPL-MCNC: 6.5 MG/DL (ref 2.6–6)
WBC # BLD AUTO: 13 K/UL (ref 3.6–11)
WBC # BLD AUTO: 13.9 K/UL (ref 3.6–11)

## 2017-06-08 PROCEDURE — 80053 COMPREHEN METABOLIC PANEL: CPT | Performed by: OBSTETRICS & GYNECOLOGY

## 2017-06-08 PROCEDURE — 84550 ASSAY OF BLOOD/URIC ACID: CPT | Performed by: OBSTETRICS & GYNECOLOGY

## 2017-06-08 PROCEDURE — 76819 FETAL BIOPHYS PROFIL W/O NST: CPT | Performed by: OBSTETRICS & GYNECOLOGY

## 2017-06-08 PROCEDURE — 74011250636 HC RX REV CODE- 250/636: Performed by: OBSTETRICS & GYNECOLOGY

## 2017-06-08 PROCEDURE — 85027 COMPLETE CBC AUTOMATED: CPT | Performed by: OBSTETRICS & GYNECOLOGY

## 2017-06-08 PROCEDURE — 75410000002 HC LABOR FEE PER 1 HR

## 2017-06-08 PROCEDURE — 74011250637 HC RX REV CODE- 250/637: Performed by: OBSTETRICS & GYNECOLOGY

## 2017-06-08 PROCEDURE — 85025 COMPLETE CBC W/AUTO DIFF WBC: CPT | Performed by: OBSTETRICS & GYNECOLOGY

## 2017-06-08 PROCEDURE — 36415 COLL VENOUS BLD VENIPUNCTURE: CPT | Performed by: OBSTETRICS & GYNECOLOGY

## 2017-06-08 PROCEDURE — 76815 OB US LIMITED FETUS(S): CPT | Performed by: OBSTETRICS & GYNECOLOGY

## 2017-06-08 PROCEDURE — 59025 FETAL NON-STRESS TEST: CPT

## 2017-06-08 PROCEDURE — 86900 BLOOD TYPING SEROLOGIC ABO: CPT | Performed by: OBSTETRICS & GYNECOLOGY

## 2017-06-08 PROCEDURE — 65270000029 HC RM PRIVATE

## 2017-06-08 RX ORDER — SODIUM CHLORIDE 0.9 % (FLUSH) 0.9 %
5-10 SYRINGE (ML) INJECTION EVERY 8 HOURS
Status: DISCONTINUED | OUTPATIENT
Start: 2017-06-08 | End: 2017-06-11 | Stop reason: HOSPADM

## 2017-06-08 RX ORDER — SODIUM CHLORIDE 0.9 % (FLUSH) 0.9 %
5-10 SYRINGE (ML) INJECTION AS NEEDED
Status: DISCONTINUED | OUTPATIENT
Start: 2017-06-08 | End: 2017-06-11 | Stop reason: HOSPADM

## 2017-06-08 RX ORDER — MAGNESIUM SULFATE HEPTAHYDRATE 40 MG/ML
2 INJECTION, SOLUTION INTRAVENOUS CONTINUOUS
Status: DISPENSED | OUTPATIENT
Start: 2017-06-09 | End: 2017-06-10

## 2017-06-08 RX ORDER — SODIUM CHLORIDE, SODIUM LACTATE, POTASSIUM CHLORIDE, CALCIUM CHLORIDE 600; 310; 30; 20 MG/100ML; MG/100ML; MG/100ML; MG/100ML
75 INJECTION, SOLUTION INTRAVENOUS CONTINUOUS
Status: DISCONTINUED | OUTPATIENT
Start: 2017-06-08 | End: 2017-06-09

## 2017-06-08 RX ORDER — NALBUPHINE HYDROCHLORIDE 10 MG/ML
10 INJECTION, SOLUTION INTRAMUSCULAR; INTRAVENOUS; SUBCUTANEOUS
Status: DISCONTINUED | OUTPATIENT
Start: 2017-06-08 | End: 2017-06-11 | Stop reason: HOSPADM

## 2017-06-08 RX ORDER — OXYTOCIN IN 5 % DEXTROSE 30/500 ML
1-25 PLASTIC BAG, INJECTION (ML) INTRAVENOUS
Status: DISCONTINUED | OUTPATIENT
Start: 2017-06-09 | End: 2017-06-13 | Stop reason: HOSPADM

## 2017-06-08 RX ORDER — TERBUTALINE SULFATE 1 MG/ML
0.25 INJECTION SUBCUTANEOUS AS NEEDED
Status: DISCONTINUED | OUTPATIENT
Start: 2017-06-08 | End: 2017-06-11 | Stop reason: HOSPADM

## 2017-06-08 RX ORDER — ONDANSETRON 2 MG/ML
4 INJECTION INTRAMUSCULAR; INTRAVENOUS
Status: DISCONTINUED | OUTPATIENT
Start: 2017-06-08 | End: 2017-06-09

## 2017-06-08 RX ADMIN — Medication 1 TABLET: at 09:00

## 2017-06-08 RX ADMIN — URSODIOL 500 MG: 500 TABLET, FILM COATED ORAL at 19:17

## 2017-06-08 RX ADMIN — DINOPROSTONE 10 MG: 10 INSERT VAGINAL at 19:55

## 2017-06-08 RX ADMIN — ZOLPIDEM TARTRATE 5 MG: 5 TABLET ORAL at 22:54

## 2017-06-08 RX ADMIN — URSODIOL 500 MG: 500 TABLET, FILM COATED ORAL at 09:30

## 2017-06-08 RX ADMIN — URSODIOL 500 MG: 500 TABLET, FILM COATED ORAL at 12:20

## 2017-06-08 RX ADMIN — SODIUM CHLORIDE, SODIUM LACTATE, POTASSIUM CHLORIDE, AND CALCIUM CHLORIDE 75 ML/HR: 600; 310; 30; 20 INJECTION, SOLUTION INTRAVENOUS at 20:46

## 2017-06-08 NOTE — PROGRESS NOTES
High Risk Obstetrics Progress Note    Name: Claritza Booker MRN: [id number]  SSN: [id number]    YOB: 1990  Age: 28 y.o. Sex: female      Subjective:      LOS: 4 days    Estimated Date of Delivery: 17   Gestational Age Today: 35w2d     Patient admitted for cholestasis and concern regarding preeclampsia. Has not felt well last 24 hours - blurry vision, \"feels like I'm under water\". States she has fetal movement. No headache today. .    Objective:     Vitals:  Blood pressure 139/90, pulse 85, temperature 98.5 °F (36.9 °C), resp. rate 18, height 5' 4\" (1.626 m), weight 86.9 kg (191 lb 8 oz), not currently breastfeeding. Temp (24hrs), Av.4 °F (36.9 °C), Min:98.2 °F (36.8 °C), Max:98.5 °F (18.7 °C)    Systolic (89EUO), BXU:750 , Min:138 , MJJ:869      Diastolic (09AEA), GFR:34, Min:73, Max:90       Intake and Output:          Physical Exam:  Patient without distress. Abdomen: soft, nontender  Fundus: soft and non tender  Lower Extremities:  - Edema 1+       Membranes:  Intact    Uterine Activity:  none    Fetal Heart Rate: For monitoring on L&D        Labs:   Recent Results (from the past 36 hour(s))   CBC WITH AUTOMATED DIFF    Collection Time: 17  5:37 AM   Result Value Ref Range    WBC 13.0 (H) 3.6 - 11.0 K/uL    RBC 4.02 3.80 - 5.20 M/uL    HGB 12.0 11.5 - 16.0 g/dL    HCT 35.1 35.0 - 47.0 %    MCV 87.3 80.0 - 99.0 FL    MCH 29.9 26.0 - 34.0 PG    MCHC 34.2 30.0 - 36.5 g/dL    RDW 13.7 11.5 - 14.5 %    PLATELET 734 544 - 475 K/uL    NEUTROPHILS 73 32 - 75 %    LYMPHOCYTES 22 12 - 49 %    MONOCYTES 4 (L) 5 - 13 %    EOSINOPHILS 1 0 - 7 %    BASOPHILS 0 0 - 1 %    ABS. NEUTROPHILS 9.5 (H) 1.8 - 8.0 K/UL    ABS. LYMPHOCYTES 2.9 0.8 - 3.5 K/UL    ABS. MONOCYTES 0.5 0.0 - 1.0 K/UL    ABS. EOSINOPHILS 0.1 0.0 - 0.4 K/UL    ABS.  BASOPHILS 0.0 0.0 - 0.1 K/UL   METABOLIC PANEL, COMPREHENSIVE    Collection Time: 17  5:37 AM   Result Value Ref Range    Sodium 138 136 - 145 mmol/L Potassium 3.9 3.5 - 5.1 mmol/L    Chloride 107 97 - 108 mmol/L    CO2 22 21 - 32 mmol/L    Anion gap 9 5 - 15 mmol/L    Glucose 85 65 - 100 mg/dL    BUN 10 6 - 20 MG/DL    Creatinine 0.83 0.55 - 1.02 MG/DL    BUN/Creatinine ratio 12 12 - 20      GFR est AA >60 >60 ml/min/1.73m2    GFR est non-AA >60 >60 ml/min/1.73m2    Calcium 9.0 8.5 - 10.1 MG/DL    Bilirubin, total 0.5 0.2 - 1.0 MG/DL    ALT (SGPT) 199 (H) 12 - 78 U/L    AST (SGOT) 54 (H) 15 - 37 U/L    Alk. phosphatase 205 (H) 45 - 117 U/L    Protein, total 6.3 (L) 6.4 - 8.2 g/dL    Albumin 2.3 (L) 3.5 - 5.0 g/dL    Globulin 4.0 2.0 - 4.0 g/dL    A-G Ratio 0.6 (L) 1.1 - 2.2     URIC ACID    Collection Time: 17  5:37 AM   Result Value Ref Range    Uric acid 6.5 (H) 2.6 - 6.0 MG/DL       Assessment and Plan: Active Problems:    Preeclampsia (6/3/2017)       30yo  at 34w2d with cholestasis and constellation of s/sx consistent with severe preeclampsia. Reviewed plan of care and recommendations with Dr Brett Masters and then with the pt and her  who are agreeable to proceed with IOL. Discussed cervidil tonight and pitocin in the morning. GBS negative. Magnesium seizure prophylaxis tomorrow. [address] labs again in the morning. For continuous monitoring.     Signed By: Tash Schwab MD     2017

## 2017-06-08 NOTE — LACTATION NOTE
Initial [facility] LC visit - This is moms 1st baby and she is planning to breast feed. She has not had an opportunity to take any classes. Discussed the importance of breast milk for babies in general, and the extra importance and benefits for pre-term babies. Discussed pumping and storage of breast milk. Mom will be induced in the morning with an impending NICU stay. Mom and dad were given a tour of the NICU. Discussed family centered care as it relates to the term baby and what we do in the NICU to keep the family at the center of our care. Answered moms questions. Will continue to follow.

## 2017-06-08 NOTE — PROGRESS NOTES
Bedside and Verbal shift change report given to HUMAIRA Gardner RN by CONCHITA Calderon RN. Report included the following information SBAR, Kardex and MAR.     0915 pt resting in bed without complaints, denies any headaches or changes in vision at this time    1230 Pt watching tv with  at bedside. Denies complaints     1430 pt off floor for ultrasound via wheelchair with . [id number] pt returned from perinatology with plans for cervadil induction tonight.      1730 Pt to NICU for tour with NICU staff          [phone] Hourly rounds made  [id number] Hourly rounds made  [phone] Hourly rounds made

## 2017-06-08 NOTE — PROGRESS NOTES
Bedside shift change report given to Michelle Ibarra RN (oncoming nurse) by Annette Rubio RN (offgoing nurse). Report included the following information SBAR, Kardex and MAR.

## 2017-06-09 ENCOUNTER — ANESTHESIA (OUTPATIENT)
Dept: LABOR AND DELIVERY | Age: 27
End: 2017-06-09
Payer: COMMERCIAL

## 2017-06-09 ENCOUNTER — ANESTHESIA EVENT (OUTPATIENT)
Dept: LABOR AND DELIVERY | Age: 27
End: 2017-06-09
Payer: COMMERCIAL

## 2017-06-09 PROCEDURE — 74011250636 HC RX REV CODE- 250/636: Performed by: OBSTETRICS & GYNECOLOGY

## 2017-06-09 PROCEDURE — 74011250637 HC RX REV CODE- 250/637: Performed by: OBSTETRICS & GYNECOLOGY

## 2017-06-09 PROCEDURE — 65270000029 HC RM PRIVATE

## 2017-06-09 PROCEDURE — 59200 INSERT CERVICAL DILATOR: CPT

## 2017-06-09 PROCEDURE — 75410000002 HC LABOR FEE PER 1 HR

## 2017-06-09 RX ORDER — FAMOTIDINE 20 MG/1
20 TABLET, FILM COATED ORAL 2 TIMES DAILY
Status: DISCONTINUED | OUTPATIENT
Start: 2017-06-09 | End: 2017-06-13 | Stop reason: HOSPADM

## 2017-06-09 RX ORDER — NALOXONE HYDROCHLORIDE 0.4 MG/ML
0.4 INJECTION, SOLUTION INTRAMUSCULAR; INTRAVENOUS; SUBCUTANEOUS AS NEEDED
Status: DISCONTINUED | OUTPATIENT
Start: 2017-06-09 | End: 2017-06-11 | Stop reason: HOSPADM

## 2017-06-09 RX ORDER — SODIUM CHLORIDE, SODIUM LACTATE, POTASSIUM CHLORIDE, CALCIUM CHLORIDE 600; 310; 30; 20 MG/100ML; MG/100ML; MG/100ML; MG/100ML
50 INJECTION, SOLUTION INTRAVENOUS CONTINUOUS
Status: DISCONTINUED | OUTPATIENT
Start: 2017-06-09 | End: 2017-06-13 | Stop reason: HOSPADM

## 2017-06-09 RX ORDER — FENTANYL/BUPIVACAINE/NS/PF 2-1250MCG
1-16 PREFILLED PUMP RESERVOIR EPIDURAL CONTINUOUS
Status: DISCONTINUED | OUTPATIENT
Start: 2017-06-09 | End: 2017-06-13 | Stop reason: HOSPADM

## 2017-06-09 RX ORDER — FENTANYL CITRATE 50 UG/ML
100 INJECTION, SOLUTION INTRAMUSCULAR; INTRAVENOUS ONCE
Status: ACTIVE | OUTPATIENT
Start: 2017-06-09 | End: 2017-06-09

## 2017-06-09 RX ORDER — BUPIVACAINE HYDROCHLORIDE 5 MG/ML
30 INJECTION, SOLUTION EPIDURAL; INTRACAUDAL AS NEEDED
Status: DISCONTINUED | OUTPATIENT
Start: 2017-06-09 | End: 2017-06-11 | Stop reason: HOSPADM

## 2017-06-09 RX ORDER — ONDANSETRON 2 MG/ML
8 INJECTION INTRAMUSCULAR; INTRAVENOUS
Status: DISCONTINUED | OUTPATIENT
Start: 2017-06-09 | End: 2017-06-11 | Stop reason: HOSPADM

## 2017-06-09 RX ADMIN — Medication 2 G/HR: at 17:44

## 2017-06-09 RX ADMIN — NALBUPHINE HYDROCHLORIDE 10 MG: 10 INJECTION, SOLUTION INTRAMUSCULAR; INTRAVENOUS; SUBCUTANEOUS at 19:50

## 2017-06-09 RX ADMIN — NALBUPHINE HYDROCHLORIDE 10 MG: 10 INJECTION, SOLUTION INTRAMUSCULAR; INTRAVENOUS; SUBCUTANEOUS at 02:03

## 2017-06-09 RX ADMIN — Medication 2 MILLI-UNITS/MIN: at 08:53

## 2017-06-09 RX ADMIN — MAGNESIUM SULFATE HEPTAHYDRATE 6 G: 500 INJECTION, SOLUTION INTRAMUSCULAR; INTRAVENOUS at 11:17

## 2017-06-09 RX ADMIN — URSODIOL 500 MG: 500 TABLET, FILM COATED ORAL at 08:42

## 2017-06-09 RX ADMIN — Medication 2 G/HR: at 11:54

## 2017-06-09 RX ADMIN — FAMOTIDINE 20 MG: 20 TABLET ORAL at 17:02

## 2017-06-09 RX ADMIN — URSODIOL 500 MG: 500 TABLET, FILM COATED ORAL at 21:49

## 2017-06-09 NOTE — CONSULTS
MFM - Please see the Ultrasound report / consult note to be entered into this patient's record as a scanned document from my office. A text copy of this note is also provided below for convenience. Cristopher Reddy M.D., Ph.D.  Kenneth Scales    -----------------------------------------------------------------------------    Indication: Cholestasis 3rd Tri O26.613/B18.2, Pre-eclampsia.   ____________________________________________________________________________  History: Age: 28 years. : 1 Para: 0.   Current Pregnancy: Pre- pregnancy data: Weight 155 lbs. Height 5 ft 5 ins. BMI 25.8.  ____________________________________________________________________________  Dating:  Stated EDC:  EDC: 17 GA by stated EDC: 34w2d  Current Scan on: 17 EDC: 17 GA by current scan: 34w1d  Best Overall Assessment: 17 EDC: 17 Assessed GA: 34w2d  The calculation of the gestational age by current scan was based on BPD, HC, AC and FL. The Best Overall Assessment is based on the stated EDC.  ____________________________________________________________________________  General Evaluation:  Fetal heart activity: present. Fetal heart rate: 186 bpm.   Presentation: cephalic. Fetal movement: visible. Amniotic Fluid: normal. PRANAV  21.0 cm. Maximal vertical pocket 7.6 cm. Cord: 3 vessels. Placental cord insertion site: Normal.   Placenta: anterior. ____________________________________________________________________________  Anatomy Scan:  Storm gestation. Biometry:  Fetal Measurements used for the estimation of the gestational age are bolded.   BPD 84.8 mm 45th% 34w1d (33w1d to 35w1d)  .1 mm 13th% 34w1d (31w1d to 37w0d)  .4 mm 61st% 34w3d (33w4d to 35w3d)  FL 65.8 mm 30th% 33w6d (31w0d to 36w6d)  .4 mm 29th% 33w0d  HUM 58.3 mm 50th% 34w2d  EFW (lbs/oz) 5 lbs 4 ozs  EFW (g) 2386 g  44th%       Fetal Anatomy:  Visualized with normal appearance: head, chest, gastrointestinal tract, kidneys, bladder, skeleton. Brain: Limited views. Face: Appears normal.   Spine: appears normal  Heart: The 4-chamber view was obtained but outflow tracts could not be adequately visualized. Genitalia: female fetus. Summary of Ultrasound Findings:  Transabdominal US. U/S machine: Minekey E8 Expert. U/S view: limited by late gestational age. Impression: The evaluated fetal anatomy appears normal.    ____________________________________________________________________________  Fetal Wellbeing Assessment:  Amniotic fluid: normal. PRANAV: 21.0 cm. MVP: 7.6 cm. Q1: 7.6 cm. Q2: 6.1 cm. Q3: 3.8 cm. Q4: 3.5 cm. Biophysical Profile: Fetal body movements: normal (2), Fetal tone: normal (2), Fetal breathing movements: normal (2), Amniotic fluid volume: normal (2). Score 8 / 8. Summary: Reassuring fetal status. ____________________________________________________________________________  Consultation:  Type of Consultation: Time-based patient evaluation:  60 minutes  Consultant: Dr. Chad Castellon spent 60-minutes combined time with this patient for counseling, coordination of care, and floor time. Ms. Katie Ibarra is admitted for evaluation of PIH / pre-eclampsia and intrahepatic cholestasis of pregnancy. She has received  steroids and has recent reports of headache and difficulty breathing. She has received  steroids. Blood pressures are elevated with most values in 130's-150's / 's mmHg, but some severe-range hypertension values have been noted.     Relevant laboratory values include:                                           Date 17                 Date 17                    Date 17  --------------------------------------------------------------------------------------------------------------------------  Platelet count                     210                               219                                  180  serum creatinine 0.81                              0.87                                  0.83  ALT                                     174                              453                                    199  AST                                     99                                296                                    54  uric acid                                                                    6.8                                    6.5  24-hr protein                     303 mg/24-hr  bile acids                           141.5 umol/L                50.2 umol/L on 6-5-17    Presentation of her cholestasis with bile acid levels > 100 umol/L confers significantly increased risk of obstetrical complications, including an increased risk for stillbirth. My general recommendations support delivery timing for 35 weeks for this severity of bile acidemia. Correction of bile acids to 50.2 umol/L is encouraging, but the diagnosis of cholestasis is confirmed. The elevation of LFTs may be explained at least in part by the diagnosis of cholestasis of pregnancy. However it is not clear whether or not -- or by how much -- the LFTs are increased due to pre-eclampsia. A decrease in platelet count, an increase in serum creatinine, and an elevated uric acid all point toward worsening pre-eclampsia and are not attributable to cholestasis of pregnancy. Given her occasional severe-range blood pressures and laboratory findings suggestive of worsening pre-eclampsia, it is highly possible (or likely) that Ms. Varinder Holt has pre-eclampsia with severe features. The elevated LFTs may be due to pre-eclampsia, at least in part. The practical clinical decision to make at this time is about timing of delivery. In this case, pre-eclampsia with severe features is felt to be highly likely for Ms. Medrano. Based on this likelihood, delivery is indicated at this time.   The severity of cholestasis of pregnancy (bile acids > 100 umol/L) also warrants  delivery. The risks of conservative management are very high in the context of Ms. Kadeem Pantoja having pre-eclampsia with severe features--and also intrahepatic cholestasis of pregnancy. Delivery at a slightly delayed gestational age from now is not likely to have significantly greater benefits for sangeeta fetus than delivery at this time. In my opinion, delay in delivery does not justify the risks of on-going pregnancy for the patient or the fetus. I reviewed this information with Ms. Kadeem Pantoja and her partner today. We discussed the most likely perceived risks to mother and fetus due to the diagnoses of pre-eclampsia and intrahepatic cholestasis of pregnancy. At the conclusion of our discussion, Ms. Kadeem Pantoja was supportive of delivery planning at this time. We discussed induction of labor methods and likely  outcomes. Thank you, very much, for this opportunity to provide consultation for your patient. If you have any questions or concerns, please do not hesitate to contact our office at your convenience.  ____________________________________________________________________________  Report Summary:  Impression: A limited study was performed for fetal evaluation and growth. A biophysical profile is performed to evaluate fetal well-being. Ms. Kadeem Pantoja is currently hospitalized for evaluation of hypertension / pre-eclampsia and cholestasis of pregnancy. Pre-eclampsia evaluation is based on increasing blood pressures, including some severe-range blood pressures. Proteinuria is >300 mg/24-hr. Platelets are normal, but have decreased from 220 to 180 K/uL. Renal function is mildly compromised with serum creatinine of up to 0.87 mg/dL. Hemoconcentration is noted by increasing hematocrit and elevated uric acid levels.   Liver function tests show elevated AST and ALT -- ALT initially 174 U/L on 17, increased to 453 U/L on 17, and today (17) a value of 199 U/L -- AST values from 99 U/L on 6-2-17, increased to 296 U/L on 6-4-17, and today (6-8-17) a value of 54 U/L. Regarding intrahepatic cholestasis of pregnancy, her bile acid level was 142 umol/L on 6-5-17 which reduced to 50 umol/L on 6-6-17 following ursodiol treatment (500 mg tid). Her pruritis has improved with ursodiol treatment. She has symptoms of headache (suspected migraine) and some recent complaint difficulty breathing. She reports good fetal movement. Single viable cephalic IUP with composite biometry consistent with dates is observed. EFW is appropriate for gestational age. Fetal anatomy was not reviewed in detail, but appears normal as indicated above. Normal fetal movements and amniotic fluid measurements are noted. Fetal assessment is reassuring. See BPP score above. Recommendations: Recommend non-urgent delivery at this time.   Please see the note above.  ____________________________________________________________________________  Fetal Growth Overview:  Date GA BPD [mm] HC [mm] AC [mm] FL [mm] HUM [mm] EFW GP  06/08/17 34 + 2 84.8 45th 306.1 13th 305.4 61st 65.8 30th 58.3 50th 2386g , 5 lbs 4 ozs 44th%%

## 2017-06-09 NOTE — ROUTINE PROCESS
Bedside shift change report given to PJ Garcia RN (oncoming nurse) by Earline Fagan, ANITRA (offgoing nurse). Report included the following information SBAR, Kardex, Intake/Output, MAR and Recent Results. 1386 Dr. Eligio Whitmore at bedside. Strip reviewed, discussing plan with patient. 3698 SVE 1/50/-2 per Dr. Del Valle Failing. Membranes stripped. Will start pitocin. 1260 [facility] with Dr. Del Valle Failing, plan to start Jodeen Block Dr. Elisabeth Lackey at bedside. Taveras bulb placed, will stay until morning. D/C pitocin for now. 1940 Bedside shift change report given to JENA Chakraborty RN (oncoming nurse) by PJ Friedman RN (offgoing nurse). Report included the following information SBAR, Kardex, Intake/Output, MAR and Recent Results.

## 2017-06-09 NOTE — PROGRESS NOTES
Labor Progress Note  Patient seen, fetal heart rate and contraction pattern evaluated, patient examined. BPs nonsevere now. Denies headache, changes in vision, RUQ pain. Patient Vitals for the past 1 hrs:   BP Temp Pulse Resp   06/09/17 0756 (!) 134/91 97.5 °F (36.4 °C) (!) 122 16       Physical Exam:  Cervical Exam:  1 cm dilated    50% effaced    -2 station    Presenting Part: cephalic  Membranes:  Intact  Uterine Activity: derrick q2-4 min  Fetal Heart Rate: Reactive    Assessment/Plan:  Reassuring fetal status, Continue plan for vaginal delivery. Start pitocin now.

## 2017-06-09 NOTE — PROGRESS NOTES
Labor Progress Note  Patient seen, fetal heart rate and contraction pattern evaluated, patient examined. Pt on 20 units of pitocin and she is feeling \"slightly crampy. \" Has not asked for any pain medicine. Has not required any tx for her BPs throughout the day. Labile. Patient Vitals for the past 2 hrs:   BP Pulse   06/09/17 1817 (!) 161/103 (!) 110       Physical Exam:  Cervical Exam:  1 (Dr. Niko Cutler %/-2/Posterior- unchanged from previous  Uterine Activity: Frequency: Every 2-3 minutes and Intensity: mild  Fetal Heart Rate: baseline 160s, mod variability, reactive, category 1  Cook catheter balloon placed with 60cc in each balloon    Assessment/Plan:  Reassuring fetal status. Pt has not progressed since this morning. Not feeling contractions with pitocin. Discussed options with pt for further cervical ripening and pt was in agreement with cook catheter balloon. Will stop pitocin and also stop magnesium if BPs return to being normal to mild. Most recent BP checked immediately after balloon placement. Pain medicine as needed. Will apply pressure to cook catheter balloon throughout the night and recheck cervix in 12 hrs if balloon still in place.  Anticipate restarting pitocin in the AM.     Monica Newby, DO

## 2017-06-09 NOTE — PROGRESS NOTES
1935~  TRANSFER - IN REPORT:    Verbal report received from Paul Borja RN(name) on [facility]  being received from [facility](unit) for change in patient condition(physician orders based on need for induction)      Report consisted of patients Situation, Background, Assessment and   Recommendations(SBAR). Information from the following report(s) SBAR, Intake/Output, MAR and Recent Results was reviewed with the receiving nurse. Opportunity for questions and clarification was provided. Assessment completed upon patients arrival to unit and care assumed. 2130~ Dr Karen Jessica updated on pateint's pressures  2230~  Pateint uncomfortable with contractions requesting meds  0715~  Cervidil tape out, patient up to shower. 0745~  Bedside and Verbal shift change report given to A Radha RN (oncoming nurse) by DENNIS Roche RN (offgoing nurse). Report included the following information SBAR, Intake/Output and MAR.

## 2017-06-09 NOTE — PROGRESS NOTES
Patient received to Labor and Delivery for planned ripening/IOL due to cholestasis and preeclampsia. She currently reports a mild headache and slight blurring of her vision. She denies abdominal pain or vaginal bleeding. Visit Vitals    BP (!) 150/100    Pulse 82    Temp 98.3 °F (36.8 °C)    Resp 16    Ht 5' 4\" (1.626 m)    Wt 86.9 kg (191 lb 8 oz)    LMP  (Approximate)    Breastfeeding No    BMI 32.87 kg/m2     FHR baseline 150, category 1  [place] occasional contractions  Cervix closed/thick; vertex presentation    Cervidil placed after verbal consent from patient. Will monitor blood pressures closely; magnesium planned during active labor or earlier if there are persistent severe range pressures.     Rosibel Harris MD  9:05 PM

## 2017-06-09 NOTE — PROGRESS NOTES
Labor Progress Note  Patient seen, fetal heart rate and contraction pattern evaluated, patient examined. Denies headache, changes in vision, RUQ pain. Patient Vitals for the past 1 hrs:   BP Temp Pulse Resp   06/09/17 1259 (!) 150/103 - (!) 103 -   06/09/17 1206 (!) 143/96 97.8 °F (36.6 °C) (!) 123 14       Physical Exam:  Cervical Exam:  def  Uterine Activity: derrick q2-3min  Fetal Heart Rate: Baseline: 150 per minute  Variability: moderate  Accelerations: yes  Decelerations: none    Assessment/Plan:  Reassuring fetal status, Continue plan for vaginal delivery. Continue pitocin and magnesium.

## 2017-06-10 LAB
ALBUMIN SERPL BCP-MCNC: 2.3 G/DL (ref 3.5–5)
ALBUMIN/GLOB SERPL: 0.6 {RATIO} (ref 1.1–2.2)
ALP SERPL-CCNC: 221 U/L (ref 45–117)
ALT SERPL-CCNC: 198 U/L (ref 12–78)
ANION GAP BLD CALC-SCNC: 10 MMOL/L (ref 5–15)
AST SERPL W P-5'-P-CCNC: 95 U/L (ref 15–37)
BASOPHILS # BLD AUTO: 0 K/UL (ref 0–0.1)
BASOPHILS # BLD: 0 % (ref 0–1)
BILIRUB SERPL-MCNC: 0.8 MG/DL (ref 0.2–1)
BUN SERPL-MCNC: 10 MG/DL (ref 6–20)
BUN/CREAT SERPL: 10 (ref 12–20)
CALCIUM SERPL-MCNC: 8.3 MG/DL (ref 8.5–10.1)
CHLORIDE SERPL-SCNC: 105 MMOL/L (ref 97–108)
CO2 SERPL-SCNC: 23 MMOL/L (ref 21–32)
CREAT SERPL-MCNC: 1.04 MG/DL (ref 0.55–1.02)
EOSINOPHIL # BLD: 0 K/UL (ref 0–0.4)
EOSINOPHIL NFR BLD: 0 % (ref 0–7)
ERYTHROCYTE [DISTWIDTH] IN BLOOD BY AUTOMATED COUNT: 13.7 % (ref 11.5–14.5)
GLOBULIN SER CALC-MCNC: 3.7 G/DL (ref 2–4)
GLUCOSE SERPL-MCNC: 87 MG/DL (ref 65–100)
HCT VFR BLD AUTO: 36.3 % (ref 35–47)
HGB BLD-MCNC: 12.5 G/DL (ref 11.5–16)
LYMPHOCYTES # BLD AUTO: 16 % (ref 12–49)
LYMPHOCYTES # BLD: 2.4 K/UL (ref 0.8–3.5)
MCH RBC QN AUTO: 30.1 PG (ref 26–34)
MCHC RBC AUTO-ENTMCNC: 34.4 G/DL (ref 30–36.5)
MCV RBC AUTO: 87.5 FL (ref 80–99)
MONOCYTES # BLD: 0.8 K/UL (ref 0–1)
MONOCYTES NFR BLD AUTO: 5 % (ref 5–13)
NEUTS SEG # BLD: 11.8 K/UL (ref 1.8–8)
NEUTS SEG NFR BLD AUTO: 79 % (ref 32–75)
PLATELET # BLD AUTO: 185 K/UL (ref 150–400)
POTASSIUM SERPL-SCNC: 4 MMOL/L (ref 3.5–5.1)
PROT SERPL-MCNC: 6 G/DL (ref 6.4–8.2)
RBC # BLD AUTO: 4.15 M/UL (ref 3.8–5.2)
SODIUM SERPL-SCNC: 138 MMOL/L (ref 136–145)
URATE SERPL-MCNC: 8.4 MG/DL (ref 2.6–6)
WBC # BLD AUTO: 15 K/UL (ref 3.6–11)

## 2017-06-10 PROCEDURE — 10907ZC DRAINAGE OF AMNIOTIC FLUID, THERAPEUTIC FROM PRODUCTS OF CONCEPTION, VIA NATURAL OR ARTIFICIAL OPENING: ICD-10-PCS | Performed by: OBSTETRICS & GYNECOLOGY

## 2017-06-10 PROCEDURE — 74011250636 HC RX REV CODE- 250/636: Performed by: OBSTETRICS & GYNECOLOGY

## 2017-06-10 PROCEDURE — 74011000250 HC RX REV CODE- 250

## 2017-06-10 PROCEDURE — 65270000029 HC RM PRIVATE

## 2017-06-10 PROCEDURE — 75410000000 HC DELIVERY VAGINAL/SINGLE

## 2017-06-10 PROCEDURE — 84550 ASSAY OF BLOOD/URIC ACID: CPT | Performed by: OBSTETRICS & GYNECOLOGY

## 2017-06-10 PROCEDURE — 74011250636 HC RX REV CODE- 250/636: Performed by: ANESTHESIOLOGY

## 2017-06-10 PROCEDURE — 74011250637 HC RX REV CODE- 250/637: Performed by: OBSTETRICS & GYNECOLOGY

## 2017-06-10 PROCEDURE — 75410000002 HC LABOR FEE PER 1 HR

## 2017-06-10 PROCEDURE — 77030011943

## 2017-06-10 PROCEDURE — 36415 COLL VENOUS BLD VENIPUNCTURE: CPT | Performed by: OBSTETRICS & GYNECOLOGY

## 2017-06-10 PROCEDURE — 80053 COMPREHEN METABOLIC PANEL: CPT | Performed by: OBSTETRICS & GYNECOLOGY

## 2017-06-10 PROCEDURE — 76060000078 HC EPIDURAL ANESTHESIA

## 2017-06-10 PROCEDURE — 88307 TISSUE EXAM BY PATHOLOGIST: CPT | Performed by: OBSTETRICS & GYNECOLOGY

## 2017-06-10 PROCEDURE — 75410000003 HC RECOV DEL/VAG/CSECN EA 0.5 HR

## 2017-06-10 PROCEDURE — 74011250636 HC RX REV CODE- 250/636

## 2017-06-10 PROCEDURE — 85025 COMPLETE CBC W/AUTO DIFF WBC: CPT | Performed by: OBSTETRICS & GYNECOLOGY

## 2017-06-10 PROCEDURE — 3E0P7GC INTRODUCTION OF OTHER THERAPEUTIC SUBSTANCE INTO FEMALE REPRODUCTIVE, VIA NATURAL OR ARTIFICIAL OPENING: ICD-10-PCS | Performed by: OBSTETRICS & GYNECOLOGY

## 2017-06-10 PROCEDURE — 0KQM0ZZ REPAIR PERINEUM MUSCLE, OPEN APPROACH: ICD-10-PCS | Performed by: OBSTETRICS & GYNECOLOGY

## 2017-06-10 PROCEDURE — 00HU33Z INSERTION OF INFUSION DEVICE INTO SPINAL CANAL, PERCUTANEOUS APPROACH: ICD-10-PCS | Performed by: ANESTHESIOLOGY

## 2017-06-10 RX ORDER — OXYCODONE AND ACETAMINOPHEN 5; 325 MG/1; MG/1
1 TABLET ORAL
Status: DISCONTINUED | OUTPATIENT
Start: 2017-06-10 | End: 2017-06-11 | Stop reason: SDUPTHER

## 2017-06-10 RX ORDER — BUPIVACAINE HYDROCHLORIDE 2.5 MG/ML
INJECTION, SOLUTION EPIDURAL; INFILTRATION; INTRACAUDAL AS NEEDED
Status: DISCONTINUED | OUTPATIENT
Start: 2017-06-10 | End: 2017-06-10 | Stop reason: HOSPADM

## 2017-06-10 RX ORDER — OXYTOCIN/RINGER'S LACTATE 20/1000 ML
125-500 PLASTIC BAG, INJECTION (ML) INTRAVENOUS ONCE
Status: ACTIVE | OUTPATIENT
Start: 2017-06-10 | End: 2017-06-11

## 2017-06-10 RX ORDER — NALOXONE HYDROCHLORIDE 0.4 MG/ML
0.4 INJECTION, SOLUTION INTRAMUSCULAR; INTRAVENOUS; SUBCUTANEOUS AS NEEDED
Status: DISCONTINUED | OUTPATIENT
Start: 2017-06-10 | End: 2017-06-13 | Stop reason: HOSPADM

## 2017-06-10 RX ORDER — LIDOCAINE HYDROCHLORIDE AND EPINEPHRINE 15; 5 MG/ML; UG/ML
INJECTION, SOLUTION EPIDURAL AS NEEDED
Status: DISCONTINUED | OUTPATIENT
Start: 2017-06-10 | End: 2017-06-10 | Stop reason: HOSPADM

## 2017-06-10 RX ORDER — HYDROCORTISONE ACETATE PRAMOXINE HCL 2.5; 1 G/100G; G/100G
CREAM TOPICAL AS NEEDED
Status: DISCONTINUED | OUTPATIENT
Start: 2017-06-10 | End: 2017-06-13 | Stop reason: HOSPADM

## 2017-06-10 RX ORDER — NALBUPHINE HYDROCHLORIDE 10 MG/ML
10 INJECTION, SOLUTION INTRAMUSCULAR; INTRAVENOUS; SUBCUTANEOUS
Status: DISCONTINUED | OUTPATIENT
Start: 2017-06-10 | End: 2017-06-13 | Stop reason: HOSPADM

## 2017-06-10 RX ORDER — FENTANYL CITRATE 50 UG/ML
INJECTION, SOLUTION INTRAMUSCULAR; INTRAVENOUS
Status: COMPLETED
Start: 2017-06-10 | End: 2017-06-10

## 2017-06-10 RX ORDER — ACETAMINOPHEN 325 MG/1
650 TABLET ORAL
Status: DISCONTINUED | OUTPATIENT
Start: 2017-06-10 | End: 2017-06-11

## 2017-06-10 RX ORDER — LIDOCAINE HYDROCHLORIDE 10 MG/ML
INJECTION INFILTRATION; PERINEURAL
Status: DISPENSED
Start: 2017-06-10 | End: 2017-06-11

## 2017-06-10 RX ORDER — IBUPROFEN 400 MG/1
800 TABLET ORAL EVERY 8 HOURS
Status: DISCONTINUED | OUTPATIENT
Start: 2017-06-10 | End: 2017-06-13 | Stop reason: HOSPADM

## 2017-06-10 RX ORDER — FENTANYL CITRATE 50 UG/ML
INJECTION, SOLUTION INTRAMUSCULAR; INTRAVENOUS AS NEEDED
Status: DISCONTINUED | OUTPATIENT
Start: 2017-06-10 | End: 2017-06-10 | Stop reason: HOSPADM

## 2017-06-10 RX ORDER — FENTANYL CITRATE 50 UG/ML
100 INJECTION, SOLUTION INTRAMUSCULAR; INTRAVENOUS ONCE
Status: ACTIVE | OUTPATIENT
Start: 2017-06-10 | End: 2017-06-11

## 2017-06-10 RX ADMIN — LIDOCAINE HYDROCHLORIDE AND EPINEPHRINE 5 ML: 15; 5 INJECTION, SOLUTION EPIDURAL at 15:02

## 2017-06-10 RX ADMIN — SODIUM CHLORIDE, SODIUM LACTATE, POTASSIUM CHLORIDE, AND CALCIUM CHLORIDE 999 ML/HR: 600; 310; 30; 20 INJECTION, SOLUTION INTRAVENOUS at 14:18

## 2017-06-10 RX ADMIN — Medication 2 G/HR: at 10:32

## 2017-06-10 RX ADMIN — BUPIVACAINE HYDROCHLORIDE 2 ML: 2.5 INJECTION, SOLUTION EPIDURAL; INFILTRATION; INTRACAUDAL at 15:10

## 2017-06-10 RX ADMIN — Medication 2 G/HR: at 16:29

## 2017-06-10 RX ADMIN — FENTANYL CITRATE 100 MCG: 50 INJECTION, SOLUTION INTRAMUSCULAR; INTRAVENOUS at 15:07

## 2017-06-10 RX ADMIN — Medication 500 MILLI-UNITS/MIN: at 18:44

## 2017-06-10 RX ADMIN — BUPIVACAINE HYDROCHLORIDE 3 ML: 2.5 INJECTION, SOLUTION EPIDURAL; INFILTRATION; INTRACAUDAL at 15:13

## 2017-06-10 RX ADMIN — NALBUPHINE HYDROCHLORIDE 10 MG: 10 INJECTION, SOLUTION INTRAMUSCULAR; INTRAVENOUS; SUBCUTANEOUS at 22:45

## 2017-06-10 RX ADMIN — SODIUM CHLORIDE, SODIUM LACTATE, POTASSIUM CHLORIDE, AND CALCIUM CHLORIDE 50 ML/HR: 600; 310; 30; 20 INJECTION, SOLUTION INTRAVENOUS at 10:17

## 2017-06-10 RX ADMIN — Medication 2 MILLI-UNITS/MIN: at 10:17

## 2017-06-10 RX ADMIN — Medication 10 ML: at 10:18

## 2017-06-10 RX ADMIN — FENTANYL 0.2 MG/100ML-BUPIV 0.125%-NACL 0.9% EPIDURAL INJ 10 ML/HR: 2/0.125 SOLUTION at 15:08

## 2017-06-10 RX ADMIN — Medication 2 G/HR: at 22:38

## 2017-06-10 RX ADMIN — SODIUM CHLORIDE, SODIUM LACTATE, POTASSIUM CHLORIDE, AND CALCIUM CHLORIDE 50 ML/HR: 600; 310; 30; 20 INJECTION, SOLUTION INTRAVENOUS at 22:51

## 2017-06-10 RX ADMIN — FENTANYL CITRATE: 50 INJECTION, SOLUTION INTRAMUSCULAR; INTRAVENOUS at 15:00

## 2017-06-10 NOTE — PROGRESS NOTES
Labor Progress Note  Patient seen, fetal heart rate and contraction pattern evaluated, patient examined. Pt feeling more pressure. Patient Vitals for the past 2 hrs:   BP Temp Pulse Resp SpO2   06/10/17 1711 115/68 (P) 98.6 °F (37 °C) (!) 109 (P) 16 99 %   06/10/17 1606 - - - - 100 %   06/10/17 1605 129/62 - 97 - -       Physical Exam:  Cervical Exam:  10/100/+2  Uterine Activity: Frequency: Every 2 minutes and Intensity: strong  Fetal Heart Rate: Baseline: 160 per minute  Variability: moderate  Decelerations: variable    Assessment/Plan:  Reassuring fetal status, Labor  Progressing normally, Continue plan for vaginal delivery. Will notify NICU of anticipated delivery. Pt to start pushing in about 30 minutes.     Percy Lazcano, DO

## 2017-06-10 NOTE — ANESTHESIA PREPROCEDURE EVALUATION
Anesthetic History   No history of anesthetic complications            Review of Systems / Medical History  Patient summary reviewed, nursing notes reviewed and pertinent labs reviewed    Pulmonary  Within defined limits                 Neuro/Psych   Within defined limits           Cardiovascular    Hypertension              Exercise tolerance: >4 METS     GI/Hepatic/Renal  Within defined limits              Endo/Other  Within defined limits           Other Findings              Physical Exam    Airway  Mallampati: II  TM Distance: 4 - 6 cm  Neck ROM: normal range of motion   Mouth opening: Normal     Cardiovascular  Regular rate and rhythm,  S1 and S2 normal,  no murmur, click, rub, or gallop             Dental  No notable dental hx       Pulmonary  Breath sounds clear to auscultation               Abdominal  GI exam deferred       Other Findings            Anesthetic Plan    ASA: 2  Anesthesia type: epidural          Induction: Intravenous  Anesthetic plan and risks discussed with: Patient

## 2017-06-10 NOTE — PROGRESS NOTES
OB Hospitalist Note:    Per RN pt just received Nubain and is resting so will not see pt right now. Received report from Dr. Casey Evans and cierra bulb in and Magnesium off.     No meds for BP now and will repeat labs in am.    Delfina Preston MD

## 2017-06-10 NOTE — PROCEDURES
Delivery Note    Obstetrician:  Lizz Jeffers DO    Assistant: none    Pre-Delivery Diagnosis:  pregnancy <37 weeks, Induced labor or Single fetus    Post-Delivery Diagnosis: Living  infant(s) or Female    Intrapartum Event: Preeclampsia, Severe and cholestasis in pregnancy    Procedure: Spontaneous vaginal delivery    Epidural: YES    Monitor:  Fetal Heart Tones - External and Uterine Contractions - External    Indications for instrumental delivery: none    Estimated Blood Loss:  300cc    Episiotomy: none    Laceration(s):  2nd degree    Laceration(s) repair: YES    Presentation: Cephalic    Fetal Description: ritchie    Fetal Position: Occiput Anterior    Birth Weight: see delivery summary / NICU notes    Birth Length: see delivery summary/ NICU notes    Apgar - One Minute: 8    Apgar - Five Minutes: 9    Umbilical Cord: cord wrapped around fetal arm    Specimens: placenta- appeared to have a marginal cord insertion           Complications:  none           Cord Blood Results:   Information for the patient's :  Fernando Gibson [[id number]]     Lab Results   Component Value Date/Time    ONEIDA IgG NEG 06/10/2017 06:32 PM    Bilirubin if ONEIDA pos: IF DIRECT EZRA POSITIVE, BILIRUBIN TO FOLLOW 06/10/2017 06:32 PM    ABO/Rh(D) Abril Sunitha POSITIVE 06/10/2017 06:32 PM     Prenatal Labs:     Lab Results   Component Value Date/Time    ABO/Rh(D) Abril Sunitha POSITIVE 2017 08:47 PM    HBsAg, External negative 2016    HIV, External negative 2016    Rubella, External immune 2016    RPR, External non reactive 2016    GrBStrep, External negative 2017        Attending Attestation: I performed the procedure. NICU present for delivery due to  infant. Pt to be continued on magnesium sulfate for seizure prophylaxis for 12-24 hours postpartum.     Signed By:  Lizz Jeffers DO     Michelle 10, 2017

## 2017-06-10 NOTE — PROGRESS NOTES
2350: Verbal shift change report given to ANDREWS Henry RN (oncoming nurse) by Ryley Walsh RN (offgoing nurse). Report included the following information SBAR, Intake/Output, MAR and Recent Results. 7318: Bedside and Verbal shift change report given to LILIANA Blair RN (oncoming nurse) by Ba Laughlin RN (offgoing nurse). Report included the following information SBAR, Intake/Output, MAR and Recent Results.

## 2017-06-10 NOTE — PROGRESS NOTES
Labor Progress Note  Patient seen, fetal heart rate and contraction pattern evaluated, patient examined. BPs WNL overnight. Edith Pan catheter remains in place. No data found. Physical Exam:  Cervical Exam:  4 (exam by Dr Toure Peal %/-2/Posterior  Uterine Activity: None  Fetal Heart Rate: Reactive    Assessment/Plan:  Reassuring fetal status, Labor  Not progressing normally  start pitocin augmentation  titrating dosage safely, Continue plan for vaginal delivery. Good cervical change with balloon. Will allow pt to shower, then plan to start pitocin and magnesium sulfate for seizure prophylaxis. Continue to monitor BPs closely. Anticipate AROM at next check. Lab values continue to be c/w preeclampsia.  Slight increase in Cr and uric acid this AM.     Timoteo Libertad Newby, DO

## 2017-06-10 NOTE — PROGRESS NOTES
0745 Bedside and verbal report received from Emily Alonso RN    1000 Dr. Neeraj Polk at bedside, viewed fetal tracing, discussed plan of care, removed norton bulb, sve     1215 Dr. Neeraj Polk called and notified of current blood pressures and pt's upper abdominal pain, no new orders received    1240 Dr Neeraj Polk at the bedside, discussing plan of care    1300 Dr Neeraj Polk at bedside, viewed fetal tracing, AROM of clear fluid    1500 Dr. Cielo Monsalve at bedside to place labor epidural    0 Dr. Neeraj Polk at bedside, SVE, pt  Complete      of liveborn female by Dr. Danial Dobbs Bedside and verbal report given to HUMAIRA Henry Rn

## 2017-06-10 NOTE — PROGRESS NOTES
Labor Progress Note  Patient seen, fetal heart rate and contraction pattern evaluated, patient examined. Pt feeling more crampy. Pitocin at 8 units. Patient Vitals for the past 2 hrs:   BP Pulse SpO2   06/10/17 1203 136/83 79 -   06/10/17 1201 (!) 173/104 90 99 %   06/10/17 1107 (!) 144/97 88 -       Physical Exam:  Cervical Exam:  4 (exam by Dr Rick Song %/-2 unchanged from previous  Uterine Activity: Frequency: Every 2-4 minutes and Intensity: mild to moderate  Fetal Heart Rate: Reactive, category 1  AROM with copious amount of clear fluid. Assessment/Plan:  Reassuring fetal status. BPs remain labile. Pt on magnesium sulfate at this time. AROM performed and tolerated. Fluid bolus for possible epidural. Continue to monitor progress closely and titrate pitocin for adequate contractions. If unchanged by next exam, will place IUPC.     Lizz Jeffers, DO

## 2017-06-11 LAB
ALBUMIN SERPL BCP-MCNC: 2.2 G/DL (ref 3.5–5)
ALBUMIN SERPL BCP-MCNC: 2.3 G/DL (ref 3.5–5)
ALBUMIN/GLOB SERPL: 0.6 {RATIO} (ref 1.1–2.2)
ALBUMIN/GLOB SERPL: 0.6 {RATIO} (ref 1.1–2.2)
ALP SERPL-CCNC: 225 U/L (ref 45–117)
ALP SERPL-CCNC: 231 U/L (ref 45–117)
ALT SERPL-CCNC: 325 U/L (ref 12–78)
ALT SERPL-CCNC: 326 U/L (ref 12–78)
ANION GAP BLD CALC-SCNC: 10 MMOL/L (ref 5–15)
ANION GAP BLD CALC-SCNC: 12 MMOL/L (ref 5–15)
AST SERPL W P-5'-P-CCNC: 173 U/L (ref 15–37)
AST SERPL W P-5'-P-CCNC: 201 U/L (ref 15–37)
BASOPHILS # BLD AUTO: 0 K/UL (ref 0–0.1)
BASOPHILS # BLD AUTO: 0 K/UL (ref 0–0.1)
BASOPHILS # BLD: 0 % (ref 0–1)
BASOPHILS # BLD: 0 % (ref 0–1)
BILIRUB SERPL-MCNC: 0.6 MG/DL (ref 0.2–1)
BILIRUB SERPL-MCNC: 0.9 MG/DL (ref 0.2–1)
BUN SERPL-MCNC: 8 MG/DL (ref 6–20)
BUN SERPL-MCNC: 9 MG/DL (ref 6–20)
BUN/CREAT SERPL: 7 (ref 12–20)
BUN/CREAT SERPL: 9 (ref 12–20)
CALCIUM SERPL-MCNC: 7.1 MG/DL (ref 8.5–10.1)
CALCIUM SERPL-MCNC: 7.6 MG/DL (ref 8.5–10.1)
CHLORIDE SERPL-SCNC: 101 MMOL/L (ref 97–108)
CHLORIDE SERPL-SCNC: 99 MMOL/L (ref 97–108)
CO2 SERPL-SCNC: 23 MMOL/L (ref 21–32)
CO2 SERPL-SCNC: 24 MMOL/L (ref 21–32)
CREAT SERPL-MCNC: 1.03 MG/DL (ref 0.55–1.02)
CREAT SERPL-MCNC: 1.1 MG/DL (ref 0.55–1.02)
EOSINOPHIL # BLD: 0 K/UL (ref 0–0.4)
EOSINOPHIL # BLD: 0 K/UL (ref 0–0.4)
EOSINOPHIL NFR BLD: 0 % (ref 0–7)
EOSINOPHIL NFR BLD: 0 % (ref 0–7)
ERYTHROCYTE [DISTWIDTH] IN BLOOD BY AUTOMATED COUNT: 13.6 % (ref 11.5–14.5)
ERYTHROCYTE [DISTWIDTH] IN BLOOD BY AUTOMATED COUNT: 13.6 % (ref 11.5–14.5)
GLOBULIN SER CALC-MCNC: 3.6 G/DL (ref 2–4)
GLOBULIN SER CALC-MCNC: 3.7 G/DL (ref 2–4)
GLUCOSE SERPL-MCNC: 94 MG/DL (ref 65–100)
GLUCOSE SERPL-MCNC: 96 MG/DL (ref 65–100)
HCT VFR BLD AUTO: 36.6 % (ref 35–47)
HCT VFR BLD AUTO: 37 % (ref 35–47)
HGB BLD-MCNC: 12.4 G/DL (ref 11.5–16)
HGB BLD-MCNC: 12.6 G/DL (ref 11.5–16)
LYMPHOCYTES # BLD AUTO: 16 % (ref 12–49)
LYMPHOCYTES # BLD AUTO: 16 % (ref 12–49)
LYMPHOCYTES # BLD: 2.5 K/UL (ref 0.8–3.5)
LYMPHOCYTES # BLD: 3.1 K/UL (ref 0.8–3.5)
MCH RBC QN AUTO: 29.5 PG (ref 26–34)
MCH RBC QN AUTO: 30.1 PG (ref 26–34)
MCHC RBC AUTO-ENTMCNC: 33.9 G/DL (ref 30–36.5)
MCHC RBC AUTO-ENTMCNC: 34.1 G/DL (ref 30–36.5)
MCV RBC AUTO: 87.1 FL (ref 80–99)
MCV RBC AUTO: 88.3 FL (ref 80–99)
MONOCYTES # BLD: 0.8 K/UL (ref 0–1)
MONOCYTES # BLD: 1.2 K/UL (ref 0–1)
MONOCYTES NFR BLD AUTO: 5 % (ref 5–13)
MONOCYTES NFR BLD AUTO: 6 % (ref 5–13)
NEUTS SEG # BLD: 12.3 K/UL (ref 1.8–8)
NEUTS SEG # BLD: 15.3 K/UL (ref 1.8–8)
NEUTS SEG NFR BLD AUTO: 78 % (ref 32–75)
NEUTS SEG NFR BLD AUTO: 79 % (ref 32–75)
PLATELET # BLD AUTO: 183 K/UL (ref 150–400)
PLATELET # BLD AUTO: 190 K/UL (ref 150–400)
POTASSIUM SERPL-SCNC: 4.1 MMOL/L (ref 3.5–5.1)
POTASSIUM SERPL-SCNC: 4.3 MMOL/L (ref 3.5–5.1)
PROT SERPL-MCNC: 5.9 G/DL (ref 6.4–8.2)
PROT SERPL-MCNC: 5.9 G/DL (ref 6.4–8.2)
RBC # BLD AUTO: 4.19 M/UL (ref 3.8–5.2)
RBC # BLD AUTO: 4.2 M/UL (ref 3.8–5.2)
SODIUM SERPL-SCNC: 134 MMOL/L (ref 136–145)
SODIUM SERPL-SCNC: 135 MMOL/L (ref 136–145)
THERAPEUTIC MAGNESI,THMG: 7.7 MG/DL (ref 4.8–8.4)
URATE SERPL-MCNC: 8.4 MG/DL (ref 2.6–6)
URATE SERPL-MCNC: 8.4 MG/DL (ref 2.6–6)
WBC # BLD AUTO: 15.7 K/UL (ref 3.6–11)
WBC # BLD AUTO: 19.6 K/UL (ref 3.6–11)

## 2017-06-11 PROCEDURE — 83735 ASSAY OF MAGNESIUM: CPT | Performed by: OBSTETRICS & GYNECOLOGY

## 2017-06-11 PROCEDURE — 36415 COLL VENOUS BLD VENIPUNCTURE: CPT | Performed by: OBSTETRICS & GYNECOLOGY

## 2017-06-11 PROCEDURE — 65410000002 HC RM PRIVATE OB

## 2017-06-11 PROCEDURE — 84550 ASSAY OF BLOOD/URIC ACID: CPT | Performed by: OBSTETRICS & GYNECOLOGY

## 2017-06-11 PROCEDURE — 85025 COMPLETE CBC W/AUTO DIFF WBC: CPT | Performed by: OBSTETRICS & GYNECOLOGY

## 2017-06-11 PROCEDURE — 80053 COMPREHEN METABOLIC PANEL: CPT | Performed by: OBSTETRICS & GYNECOLOGY

## 2017-06-11 PROCEDURE — 74011250636 HC RX REV CODE- 250/636: Performed by: OBSTETRICS & GYNECOLOGY

## 2017-06-11 PROCEDURE — 74011250637 HC RX REV CODE- 250/637: Performed by: OBSTETRICS & GYNECOLOGY

## 2017-06-11 RX ORDER — HYDROMORPHONE HYDROCHLORIDE 1 MG/ML
1 INJECTION, SOLUTION INTRAMUSCULAR; INTRAVENOUS; SUBCUTANEOUS
Status: DISCONTINUED | OUTPATIENT
Start: 2017-06-11 | End: 2017-06-13 | Stop reason: HOSPADM

## 2017-06-11 RX ORDER — OXYCODONE HYDROCHLORIDE 5 MG/1
5 TABLET ORAL
Status: DISCONTINUED | OUTPATIENT
Start: 2017-06-11 | End: 2017-06-13 | Stop reason: HOSPADM

## 2017-06-11 RX ORDER — SODIUM CHLORIDE 0.9 % (FLUSH) 0.9 %
SYRINGE (ML) INJECTION
Status: DISPENSED
Start: 2017-06-11 | End: 2017-06-11

## 2017-06-11 RX ADMIN — OXYCODONE HYDROCHLORIDE 5 MG: 5 TABLET ORAL at 19:47

## 2017-06-11 RX ADMIN — Medication 10 ML: at 17:59

## 2017-06-11 RX ADMIN — Medication 2 G/HR: at 16:54

## 2017-06-11 RX ADMIN — Medication 2 G/HR: at 11:02

## 2017-06-11 RX ADMIN — FAMOTIDINE 20 MG: 20 TABLET ORAL at 21:10

## 2017-06-11 RX ADMIN — NALBUPHINE HYDROCHLORIDE 10 MG: 10 INJECTION, SOLUTION INTRAMUSCULAR; INTRAVENOUS; SUBCUTANEOUS at 04:24

## 2017-06-11 RX ADMIN — Medication 2 G/HR: at 04:26

## 2017-06-11 RX ADMIN — HYDROMORPHONE HYDROCHLORIDE 1 MG: 1 INJECTION, SOLUTION INTRAMUSCULAR; INTRAVENOUS; SUBCUTANEOUS at 12:19

## 2017-06-11 NOTE — PROGRESS NOTES
Problem: Hypertensive Disorders of Pregnancy Care Plan (Gestational HTN, Preeclampsia, HELLP syndrome, Eclampsia, Chronic HTN, Superimposed Preeclamsia)  Goal: *Blood pressure within specified parameters  Outcome: Progressing Towards Goal  Monitoring BP q1h   Goal: *Fluid volume balance  Outcome: Progressing Towards Goal  Monitoring I&Os  Goal: *Labs within defined limits  Outcome: Progressing Towards Goal  Will redraw labs at 0400    Problem: Vaginal Delivery: Day of Delivery-Post delivery  Goal: *Participates in infant care  Outcome: Progressing Towards Goal  Variance: Patient Condition  Comments: Patient on mag, baby in NICU  Goal: *Tolerating diet  Outcome: Progressing Towards Goal  Variance: Patient slowly responding  Comments: Still has c/o nausea

## 2017-06-11 NOTE — PROGRESS NOTES
1940: Bedside and Verbal shift change report given to ANDREWS Henry RN (oncoming nurse) by Jesus Licea RN (offgoing nurse). Report included the following information SBAR, Intake/Output, MAR and Recent Results. 2006: MEWS score of 3 d/t elevated HR and BP,will cont to monitor    2145: up to BSC, HR to 160s when up, pt feels SOB, O2 sats 100%, able to void (unable to measure amount), denny care completed, pt placed in regular hospital bed    2200: dad to NICU to see baby girl    2230: pumping initiated, using preemie setting on pump, pumped bilateral breasts x10 minutes    0400: pumping x15 mins, nervous about N/V with PO meds, will do nubain again when due    0645: reviewed labs and vitals with Dr. Casey Evans, plan for repeat labs at 1200, IV dilaudid for pain    0740: Bedside shift change report given to BIRGIT Greenwood RN (oncoming nurse) by ANDREWS Henry RN (offgoing nurse). Report included the following information SBAR, Intake/Output, MAR and Med Rec Status.

## 2017-06-11 NOTE — LACTATION NOTE
Infant admitted to NICU. Pt will successfully establish breast milk supply by pumping with a hospital grade pump every 2-3 hours for approximately 20 minutes/8-10 x day with the correct size flange, and suction level for mother's comfort. To maximize milk production, mom taught to incorporate breast massage and hand expression into pumping sessions. All expressed breast milk (EBM) will be provided for infant use, in clean bottles/syringes for storage in NICU breastmilk refrigerator. Patient label with barcode,date and time applied to each container prior to transport to NICU. Proper cleaning of pump parts and good hand hygiene discussed. Mother is advised to rent a hospital grade pump to continue regimen at home. Progress of milk transition, pumping log, expected EBM volumes, care of engorged breasts discussed. The value of bonding with baby emphasized, strategies for participating in infant care, photos, footprints, touch, and holding skin to skin as soon as baby and mother are able have been shown to increase oxytocin levels. The breast will be offered as baby is ready; with the goal of eventual transition to breastfeeding.

## 2017-06-11 NOTE — PROGRESS NOTES
0740 Bedside, Verbal and Written shift change report given to VALENTE Byrne RN (oncoming nurse) by HUMAIRA Henry RN (offgoing nurse). Report included the following information SBAR, Intake/Output, MAR, Accordion, Recent Results and Med Rec Status. 5386 Encouraged patient to pump, patient wants to rest for awhile and maybe pump in a little while. 1000 Dr. Ashley Abad at bedside talking with patient, discussing labs and plan of care. Patient not up to go to NICU, will continue to assess for readiness. 1110 patient ready to use the [facility], brush her teeth and then pump. 1130 patient started pumping, lactation at bedside. 1800 magnesium sulfate infusion completed. 1935 update given to PJ Chang RN. Plans to take patient via wheelchair to NICU for patient to see her infant and then take to [address] and KIM Emanuel RN will assume care. 2005 TRANSFER - OUT REPORT:    Verbal report given to KIM Emanuel RN (name) on Claritza Booker  being transferred to Antepartum (unit) for routine progression of care       Report consisted of patients Situation, Background, Assessment and   Recommendations(SBAR). Information from the following report(s) SBAR, Intake/Output, MAR, Accordion, Recent Results and Med Rec Status was reviewed with the receiving nurse. Lines:   Peripheral IV 06/10/17 Right Hand (Active)   Site Assessment Clean, dry, & intact 6/10/2017  8:00 PM   Phlebitis Assessment 0 6/10/2017  8:00 PM   Infiltration Assessment 0 6/10/2017  8:00 PM   Dressing Status Clean, dry, & intact 6/10/2017  8:00 PM   Dressing Type Tape;Transparent 6/10/2017  8:00 PM   Hub Color/Line Status Pink; Infusing 6/10/2017  8:00 PM        Opportunity for questions and clarification was provided.       Patient transported with:   Registered Nurse

## 2017-06-11 NOTE — PROGRESS NOTES
Post-Partum Day Number 1 Progress Note    Patient doing well post-partum overall. Slept intermittently throughout the night. Worried about taking any PO meds due to not having much to eat. Trying to do small amounts of liquids and had some applesauce while on magnesium sulfate. Has a headache and overall feels \"weak. \" No abdominal pain. No N/V. Pt reports baby is doing well in the NICU. Has not pumped yet.     Vitals:  Patient Vitals for the past 24 hrs:   BP Temp Pulse Resp SpO2   06/11/17 0927 - - - - 96 %   06/11/17 0912 - - - - 96 %   06/11/17 0900 124/84 - 73 16 92 %   06/11/17 0812 - - - - 98 %   06/11/17 0802 - - - - 98 %   06/11/17 0800 (!) 139/99 98.4 °F (36.9 °C) 88 16 -   06/11/17 0752 - - - - 100 %   06/11/17 0700 133/87 - 79 - 93 %   06/11/17 0600 121/78 - 78 - 92 %   06/11/17 0500 124/76 - 79 - 93 %   06/11/17 0400 139/90 97.6 °F (36.4 °C) 93 15 -   06/11/17 0300 125/82 - 83 - -   06/11/17 0200 125/79 - 86 - -   06/11/17 0100 123/82 - 96 - -   06/11/17 0000 130/78 - 97 - -   06/10/17 2300 (!) 141/93 - 94 - -   06/10/17 2200 150/84 - 99 - -   06/10/17 2151 141/83 - (!) 112 - 99 %   06/10/17 2100 142/82 - (!) 103 - -   06/10/17 2051 137/82 - (!) 103 - -   06/10/17 2036 144/89 - (!) 107 - -   06/10/17 2021 147/90 - - - -   06/10/17 2006 (!) 154/95 98.6 °F (37 °C) (!) 112 15 100 %   06/10/17 1951 (!) 152/95 - (!) 107 - 99 %   06/10/17 1936 (!) 148/96 - (!) 111 - 99 %   06/10/17 1921 149/79 - (!) 108 - 99 %   06/10/17 1906 153/83 - (!) 110 - -   06/10/17 1851 - 98.8 °F (37.1 °C) - 16 -   06/10/17 1711 115/68 98.6 °F (37 °C) (!) 109 16 99 %   06/10/17 1606 - - - - 100 %   06/10/17 1605 129/62 - 97 - -   06/10/17 1521 128/70 - (!) 107 - 98 %   06/10/17 1519 128/66 98.1 °F (36.7 °C) 96 16 -   06/10/17 1517 130/70 - (!) 104 - -   06/10/17 1516 - - - - 98 %   06/10/17 1515 133/71 - (!) 108 - -   06/10/17 1513 137/73 - 96 - -   06/10/17 1511 147/80 - (!) 110 - 99 %   06/10/17 1509 (!) 163/99 - (!) 105 - - 06/10/17 1507 (!) 163/94 - (!) 104 - -   06/10/17 1506 - - - - 98 %   06/10/17 1442 153/85 - 88 - -   06/10/17 1441 - - - - 99 %   06/10/17 1309 137/79 98.5 °F (36.9 °C) 80 16 -   06/10/17 1203 136/83 - 79 - -   06/10/17 1201 (!) 173/104 - 90 - 99 %   06/10/17 1107 (!) 144/97 - 88 - -   06/10/17 1022 (!) 137/99 - (!) 105 - -     Temp (24hrs), Av.4 °F (36.9 °C), Min:97.6 °F (36.4 °C), Max:98.8 °F (37.1 °C)      Vital signs stable, afebrile. Exam:  Patient without distress. Chest: bilateral CTA               Abdomen soft, fundus firm, nontender, No RUQ pain. Lower extremities- nontender without edema; no cords, SCDs in place, 1+ DTRs    Labs:   Recent Results (from the past 24 hour(s))   CBC WITH AUTOMATED DIFF    Collection Time: 17  4:19 AM   Result Value Ref Range    WBC 19.6 (H) 3.6 - 11.0 K/uL    RBC 4.20 3.80 - 5.20 M/uL    HGB 12.4 11.5 - 16.0 g/dL    HCT 36.6 35.0 - 47.0 %    MCV 87.1 80.0 - 99.0 FL    MCH 29.5 26.0 - 34.0 PG    MCHC 33.9 30.0 - 36.5 g/dL    RDW 13.6 11.5 - 14.5 %    PLATELET 209 166 - 574 K/uL    NEUTROPHILS 78 (H) 32 - 75 %    LYMPHOCYTES 16 12 - 49 %    MONOCYTES 6 5 - 13 %    EOSINOPHILS 0 0 - 7 %    BASOPHILS 0 0 - 1 %    ABS. NEUTROPHILS 15.3 (H) 1.8 - 8.0 K/UL    ABS. LYMPHOCYTES 3.1 0.8 - 3.5 K/UL    ABS. MONOCYTES 1.2 (H) 0.0 - 1.0 K/UL    ABS. EOSINOPHILS 0.0 0.0 - 0.4 K/UL    ABS.  BASOPHILS 0.0 0.0 - 0.1 K/UL   METABOLIC PANEL, COMPREHENSIVE    Collection Time: 17  4:19 AM   Result Value Ref Range    Sodium 135 (L) 136 - 145 mmol/L    Potassium 4.3 3.5 - 5.1 mmol/L    Chloride 101 97 - 108 mmol/L    CO2 24 21 - 32 mmol/L    Anion gap 10 5 - 15 mmol/L    Glucose 94 65 - 100 mg/dL    BUN 8 6 - 20 MG/DL    Creatinine 1.10 (H) 0.55 - 1.02 MG/DL    BUN/Creatinine ratio 7 (L) 12 - 20      GFR est AA >60 >60 ml/min/1.73m2    GFR est non-AA 59 (L) >60 ml/min/1.73m2    Calcium 7.6 (L) 8.5 - 10.1 MG/DL    Bilirubin, total 0.9 0.2 - 1.0 MG/DL    ALT (SGPT) 326 (H) 12 - 78 U/L    AST (SGOT) 201 (H) 15 - 37 U/L    Alk. phosphatase 231 (H) 45 - 117 U/L    Protein, total 5.9 (L) 6.4 - 8.2 g/dL    Albumin 2.2 (L) 3.5 - 5.0 g/dL    Globulin 3.7 2.0 - 4.0 g/dL    A-G Ratio 0.6 (L) 1.1 - 2.2     URIC ACID    Collection Time: 17  4:19 AM   Result Value Ref Range    Uric acid 8.4 (H) 2.6 - 6.0 MG/DL       Assessment and Plan:   Continue routine perineal care and maternal education. Currently on mag sulfate for seizure prophylaxis for preeclampsia with severe features, along with cholestasis in pregnancy. S/p  of 34+ wk female infant- now doing well in the NICU. Due to continued lab value abnormalities, will continue magnesium for 24 hours postpartum. Good UOP noted, normal DTRs, but will check mag level with repeat labs at noon today. Encouraged pt to pump and if feeling well enough, will have a trip to the NICU to visit daughter, and will be attended by L&D nurse.

## 2017-06-12 LAB
ALBUMIN SERPL BCP-MCNC: 2.2 G/DL (ref 3.5–5)
ALBUMIN/GLOB SERPL: 0.5 {RATIO} (ref 1.1–2.2)
ALP SERPL-CCNC: 220 U/L (ref 45–117)
ALT SERPL-CCNC: 266 U/L (ref 12–78)
ANION GAP BLD CALC-SCNC: 9 MMOL/L (ref 5–15)
AST SERPL W P-5'-P-CCNC: 104 U/L (ref 15–37)
BASOPHILS # BLD AUTO: 0 K/UL (ref 0–0.1)
BASOPHILS # BLD: 0 % (ref 0–1)
BILIRUB SERPL-MCNC: 0.4 MG/DL (ref 0.2–1)
BUN SERPL-MCNC: 13 MG/DL (ref 6–20)
BUN/CREAT SERPL: 12 (ref 12–20)
CALCIUM SERPL-MCNC: 8.4 MG/DL (ref 8.5–10.1)
CHLORIDE SERPL-SCNC: 101 MMOL/L (ref 97–108)
CO2 SERPL-SCNC: 24 MMOL/L (ref 21–32)
CREAT SERPL-MCNC: 1.07 MG/DL (ref 0.55–1.02)
EOSINOPHIL # BLD: 0.1 K/UL (ref 0–0.4)
EOSINOPHIL NFR BLD: 1 % (ref 0–7)
ERYTHROCYTE [DISTWIDTH] IN BLOOD BY AUTOMATED COUNT: 13.7 % (ref 11.5–14.5)
GLOBULIN SER CALC-MCNC: 4.5 G/DL (ref 2–4)
GLUCOSE SERPL-MCNC: 92 MG/DL (ref 65–100)
HCT VFR BLD AUTO: 37.1 % (ref 35–47)
HGB BLD-MCNC: 12.6 G/DL (ref 11.5–16)
LYMPHOCYTES # BLD AUTO: 29 % (ref 12–49)
LYMPHOCYTES # BLD: 4.2 K/UL (ref 0.8–3.5)
MCH RBC QN AUTO: 30.1 PG (ref 26–34)
MCHC RBC AUTO-ENTMCNC: 34 G/DL (ref 30–36.5)
MCV RBC AUTO: 88.5 FL (ref 80–99)
MONOCYTES # BLD: 1 K/UL (ref 0–1)
MONOCYTES NFR BLD AUTO: 7 % (ref 5–13)
NEUTS SEG # BLD: 9.3 K/UL (ref 1.8–8)
NEUTS SEG NFR BLD AUTO: 63 % (ref 32–75)
PLATELET # BLD AUTO: 206 K/UL (ref 150–400)
POTASSIUM SERPL-SCNC: 4.1 MMOL/L (ref 3.5–5.1)
PROT SERPL-MCNC: 6.7 G/DL (ref 6.4–8.2)
RBC # BLD AUTO: 4.19 M/UL (ref 3.8–5.2)
SODIUM SERPL-SCNC: 134 MMOL/L (ref 136–145)
URATE SERPL-MCNC: 8.3 MG/DL (ref 2.6–6)
WBC # BLD AUTO: 14.6 K/UL (ref 3.6–11)

## 2017-06-12 PROCEDURE — 84550 ASSAY OF BLOOD/URIC ACID: CPT | Performed by: OBSTETRICS & GYNECOLOGY

## 2017-06-12 PROCEDURE — 65410000002 HC RM PRIVATE OB

## 2017-06-12 PROCEDURE — 74011250637 HC RX REV CODE- 250/637: Performed by: OBSTETRICS & GYNECOLOGY

## 2017-06-12 PROCEDURE — 36415 COLL VENOUS BLD VENIPUNCTURE: CPT | Performed by: OBSTETRICS & GYNECOLOGY

## 2017-06-12 PROCEDURE — 85025 COMPLETE CBC W/AUTO DIFF WBC: CPT | Performed by: OBSTETRICS & GYNECOLOGY

## 2017-06-12 PROCEDURE — 80053 COMPREHEN METABOLIC PANEL: CPT | Performed by: OBSTETRICS & GYNECOLOGY

## 2017-06-12 RX ORDER — DOCUSATE SODIUM 100 MG/1
100 CAPSULE, LIQUID FILLED ORAL
Status: DISCONTINUED | OUTPATIENT
Start: 2017-06-12 | End: 2017-06-13 | Stop reason: HOSPADM

## 2017-06-12 RX ADMIN — OXYCODONE HYDROCHLORIDE 5 MG: 5 TABLET ORAL at 19:57

## 2017-06-12 RX ADMIN — OXYCODONE HYDROCHLORIDE 5 MG: 5 TABLET ORAL at 15:33

## 2017-06-12 RX ADMIN — OXYCODONE HYDROCHLORIDE 5 MG: 5 TABLET ORAL at 05:27

## 2017-06-12 RX ADMIN — OXYCODONE HYDROCHLORIDE 5 MG: 5 TABLET ORAL at 09:27

## 2017-06-12 RX ADMIN — Medication 1 TABLET: at 13:00

## 2017-06-12 RX ADMIN — DOCUSATE SODIUM 100 MG: 100 CAPSULE, LIQUID FILLED ORAL at 20:10

## 2017-06-12 RX ADMIN — OXYCODONE HYDROCHLORIDE 5 MG: 5 TABLET ORAL at 00:25

## 2017-06-12 NOTE — PROGRESS NOTES
Post-Partum Day Number 2 Progress Note    Patient doing well post-partum without significant complaints. Voiding without difficulty, normal lochia.     Vitals:  Patient Vitals for the past 24 hrs:   BP Temp Pulse Resp SpO2   06/12/17 0506 131/74 98.2 °F (36.8 °C) 72 16 -   06/12/17 0000 132/71 - 85 16 -   06/11/17 2110 (!) 132/93 98.1 °F (36.7 °C) 92 18 -   06/11/17 1951 (!) 153/97 97.9 °F (36.6 °C) 84 20 -   06/11/17 1823 - - - - 98 %   06/11/17 1813 - - - - 99 %   06/11/17 1803 - - - - 99 %   06/11/17 1759 (!) 150/96 - 89 - -   06/11/17 1753 - - - - 99 %   06/11/17 1748 (!) 146/94 - (!) 108 - 100 %   06/11/17 1743 - - - - 98 %   06/11/17 1708 - - - - 98 %   06/11/17 1700 (!) 134/92 - 75 16 -   06/11/17 1658 - - - - 99 %   06/11/17 1648 - - - - 99 %   06/11/17 1638 - - - - 98 %   06/11/17 1628 - - - - 99 %   06/11/17 1613 - - - - 99 %   06/11/17 1608 - - - - 100 %   06/11/17 1600 136/88 - 86 - -   06/11/17 1553 - - - - 98 %   06/11/17 1543 - - - - 98 %   06/11/17 1538 - - - - 100 %   06/11/17 1528 - - - - 100 %   06/11/17 1523 - - - - 99 %   06/11/17 1518 - - - - 97 %   06/11/17 1508 - - - - 99 %   06/11/17 1500 (!) 139/93 - 82 14 -   06/11/17 1453 - - - - 97 %   06/11/17 1438 - - - - 97 %   06/11/17 1423 - - - - 97 %   06/11/17 1413 - - - - 98 %   06/11/17 1400 120/81 - 76 - 93 %   06/11/17 1307 - - - - 95 %   06/11/17 1300 119/82 - 82 - 94 %   06/11/17 1247 - - - - 96 %   06/11/17 1232 - - - - 96 %   06/11/17 1217 - - - - 97 %   06/11/17 1205 (!) 139/100 - 94 - -   06/11/17 1202 - - - - 98 %   06/11/17 1147 - - - - 98 %   06/11/17 1137 - - - - 98 %   06/11/17 1122 - - - - 100 %   06/11/17 1117 - - - - 100 %   06/11/17 1100 (!) 162/95 - 81 16 -   06/11/17 1018 (!) 158/103 - 85 14 -   06/11/17 1012 - - - - 98 %   06/11/17 1002 - - - - 99 %   06/11/17 0957 - - - - 98 %   06/11/17 0927 - - - - 96 %   06/11/17 0912 - - - - 96 %   06/11/17 0900 124/84 - 73 16 92 %   06/11/17 0812 - - - - 98 %   06/11/17 0802 - - - - 98 %   17 0800 (!) 139/99 98.4 °F (36.9 °C) 88 16 -     Temp (24hrs), Av.2 °F (36.8 °C), Min:97.9 °F (36.6 °C), Max:98.4 °F (36.9 °C)      Vital signs stable, afebrile. Exam:  Patient without distress. Abdomen soft, fundus firm, nontender               Lower extremities- nontender without edema; no cords    Labs:   Recent Results (from the past 24 hour(s))   CBC WITH AUTOMATED DIFF    Collection Time: 17 11:59 AM   Result Value Ref Range    WBC 15.7 (H) 3.6 - 11.0 K/uL    RBC 4.19 3.80 - 5.20 M/uL    HGB 12.6 11.5 - 16.0 g/dL    HCT 37.0 35.0 - 47.0 %    MCV 88.3 80.0 - 99.0 FL    MCH 30.1 26.0 - 34.0 PG    MCHC 34.1 30.0 - 36.5 g/dL    RDW 13.6 11.5 - 14.5 %    PLATELET 470 331 - 303 K/uL    NEUTROPHILS 79 (H) 32 - 75 %    LYMPHOCYTES 16 12 - 49 %    MONOCYTES 5 5 - 13 %    EOSINOPHILS 0 0 - 7 %    BASOPHILS 0 0 - 1 %    ABS. NEUTROPHILS 12.3 (H) 1.8 - 8.0 K/UL    ABS. LYMPHOCYTES 2.5 0.8 - 3.5 K/UL    ABS. MONOCYTES 0.8 0.0 - 1.0 K/UL    ABS. EOSINOPHILS 0.0 0.0 - 0.4 K/UL    ABS. BASOPHILS 0.0 0.0 - 0.1 K/UL   METABOLIC PANEL, COMPREHENSIVE    Collection Time: 17 11:59 AM   Result Value Ref Range    Sodium 134 (L) 136 - 145 mmol/L    Potassium 4.1 3.5 - 5.1 mmol/L    Chloride 99 97 - 108 mmol/L    CO2 23 21 - 32 mmol/L    Anion gap 12 5 - 15 mmol/L    Glucose 96 65 - 100 mg/dL    BUN 9 6 - 20 MG/DL    Creatinine 1.03 (H) 0.55 - 1.02 MG/DL    BUN/Creatinine ratio 9 (L) 12 - 20      GFR est AA >60 >60 ml/min/1.73m2    GFR est non-AA >60 >60 ml/min/1.73m2    Calcium 7.1 (L) 8.5 - 10.1 MG/DL    Bilirubin, total 0.6 0.2 - 1.0 MG/DL    ALT (SGPT) 325 (H) 12 - 78 U/L    AST (SGOT) 173 (H) 15 - 37 U/L    Alk.  phosphatase 225 (H) 45 - 117 U/L    Protein, total 5.9 (L) 6.4 - 8.2 g/dL    Albumin 2.3 (L) 3.5 - 5.0 g/dL    Globulin 3.6 2.0 - 4.0 g/dL    A-G Ratio 0.6 (L) 1.1 - 2.2     URIC ACID    Collection Time: 17 11:59 AM   Result Value Ref Range    Uric acid 8.4 (H) 2.6 - 6.0 MG/DL   MAGNESIUM, THERAPEUTIC    Collection Time: 06/11/17 11:59 AM   Result Value Ref Range    Therapeutic magnesium 7.7 4.8 - 8.4 MG/DL   CBC WITH AUTOMATED DIFF    Collection Time: 06/12/17  5:15 AM   Result Value Ref Range    WBC 14.6 (H) 3.6 - 11.0 K/uL    RBC 4.19 3.80 - 5.20 M/uL    HGB 12.6 11.5 - 16.0 g/dL    HCT 37.1 35.0 - 47.0 %    MCV 88.5 80.0 - 99.0 FL    MCH 30.1 26.0 - 34.0 PG    MCHC 34.0 30.0 - 36.5 g/dL    RDW 13.7 11.5 - 14.5 %    PLATELET 368 579 - 732 K/uL    NEUTROPHILS 63 32 - 75 %    LYMPHOCYTES 29 12 - 49 %    MONOCYTES 7 5 - 13 %    EOSINOPHILS 1 0 - 7 %    BASOPHILS 0 0 - 1 %    ABS. NEUTROPHILS 9.3 (H) 1.8 - 8.0 K/UL    ABS. LYMPHOCYTES 4.2 (H) 0.8 - 3.5 K/UL    ABS. MONOCYTES 1.0 0.0 - 1.0 K/UL    ABS. EOSINOPHILS 0.1 0.0 - 0.4 K/UL    ABS. BASOPHILS 0.0 0.0 - 0.1 K/UL   METABOLIC PANEL, COMPREHENSIVE    Collection Time: 06/12/17  5:15 AM   Result Value Ref Range    Sodium 134 (L) 136 - 145 mmol/L    Potassium 4.1 3.5 - 5.1 mmol/L    Chloride 101 97 - 108 mmol/L    CO2 24 21 - 32 mmol/L    Anion gap 9 5 - 15 mmol/L    Glucose 92 65 - 100 mg/dL    BUN 13 6 - 20 MG/DL    Creatinine 1.07 (H) 0.55 - 1.02 MG/DL    BUN/Creatinine ratio 12 12 - 20      GFR est AA >60 >60 ml/min/1.73m2    GFR est non-AA >60 >60 ml/min/1.73m2    Calcium 8.4 (L) 8.5 - 10.1 MG/DL    Bilirubin, total 0.4 0.2 - 1.0 MG/DL    ALT (SGPT) 266 (H) 12 - 78 U/L    AST (SGOT) 104 (H) 15 - 37 U/L    Alk. phosphatase 220 (H) 45 - 117 U/L    Protein, total 6.7 6.4 - 8.2 g/dL    Albumin 2.2 (L) 3.5 - 5.0 g/dL    Globulin 4.5 (H) 2.0 - 4.0 g/dL    A-G Ratio 0.5 (L) 1.1 - 2.2     URIC ACID    Collection Time: 06/12/17  5:15 AM   Result Value Ref Range    Uric acid 8.3 (H) 2.6 - 6.0 MG/DL       Assessment and Plan:  Patient appears to be having uncomplicated post-partum course, resolving severe pre-eclampsia. Labs overall improving, however creatinine still not improving. Does appear to be diuresing well.  BPs mostly NL with a couple of mild range elevations. Given her current status, will keep for one more day, anticipate she will likely be ready for d/c tomorrow. Will recheck labs tomorrow. Discussed plan with pt.   O POSITIVE/RI

## 2017-06-12 NOTE — PROGRESS NOTES
2000: Telephone report received from VALENTE Ashby, RN. Pt in NICU visiting baby. 2104: Pt received to room 217 via wheelchair from NICU. Pt and  oriented to room.     Hourly rounds complete: [id number], [id number]

## 2017-06-12 NOTE — PROGRESS NOTES
~4162: Brief bedside received from VALENTE Craven RN. The plan is reviewed with the patient (to NICU at 2000 and then transfer to [address] [place] [address]).  ~1955: The patient is up to the bathroom to void with minimal assistance. No complaints by the patient.   ~2000: The patient is assisted to a wheelchair. The patient is transferred to NICU to visit daughter. The patient is accompanied by the nurse and her .  ~2100: The patient is transferred to [address] [place] [address] from [facility]. The patient is received by KIM Emanuel RN at the bedside.

## 2017-06-12 NOTE — PROGRESS NOTES
Bedside shift change report given to Cherri Olvera RN (oncoming nurse) by Soco Chen RN (offgoing nurse). Report included the following information SBAR, Kardex and MAR.

## 2017-06-12 NOTE — LACTATION NOTE
Mother continues to pump for baby in NICU. She is making progress, and is able to express some drops of colostrum. Mother encouraged to continue pumping routine.

## 2017-06-13 VITALS
BODY MASS INDEX: 32.69 KG/M2 | TEMPERATURE: 97.7 F | HEIGHT: 64 IN | HEART RATE: 80 BPM | DIASTOLIC BLOOD PRESSURE: 88 MMHG | RESPIRATION RATE: 16 BRPM | SYSTOLIC BLOOD PRESSURE: 137 MMHG | WEIGHT: 191.5 LBS | OXYGEN SATURATION: 98 %

## 2017-06-13 LAB
ALBUMIN SERPL BCP-MCNC: 2.3 G/DL (ref 3.5–5)
ALBUMIN/GLOB SERPL: 0.6 {RATIO} (ref 1.1–2.2)
ALP SERPL-CCNC: 229 U/L (ref 45–117)
ALT SERPL-CCNC: 220 U/L (ref 12–78)
ANION GAP BLD CALC-SCNC: 9 MMOL/L (ref 5–15)
AST SERPL W P-5'-P-CCNC: 109 U/L (ref 15–37)
BILIRUB SERPL-MCNC: 0.4 MG/DL (ref 0.2–1)
BUN SERPL-MCNC: 15 MG/DL (ref 6–20)
BUN/CREAT SERPL: 16 (ref 12–20)
CALCIUM SERPL-MCNC: 8.7 MG/DL (ref 8.5–10.1)
CHLORIDE SERPL-SCNC: 104 MMOL/L (ref 97–108)
CO2 SERPL-SCNC: 24 MMOL/L (ref 21–32)
CREAT SERPL-MCNC: 0.91 MG/DL (ref 0.55–1.02)
ERYTHROCYTE [DISTWIDTH] IN BLOOD BY AUTOMATED COUNT: 13.7 % (ref 11.5–14.5)
GLOBULIN SER CALC-MCNC: 3.8 G/DL (ref 2–4)
GLUCOSE SERPL-MCNC: 76 MG/DL (ref 65–100)
HCT VFR BLD AUTO: 34.4 % (ref 35–47)
HGB BLD-MCNC: 11.7 G/DL (ref 11.5–16)
MCH RBC QN AUTO: 30.1 PG (ref 26–34)
MCHC RBC AUTO-ENTMCNC: 34 G/DL (ref 30–36.5)
MCV RBC AUTO: 88.4 FL (ref 80–99)
PLATELET # BLD AUTO: 206 K/UL (ref 150–400)
POTASSIUM SERPL-SCNC: 3.9 MMOL/L (ref 3.5–5.1)
PROT SERPL-MCNC: 6.1 G/DL (ref 6.4–8.2)
RBC # BLD AUTO: 3.89 M/UL (ref 3.8–5.2)
SODIUM SERPL-SCNC: 137 MMOL/L (ref 136–145)
WBC # BLD AUTO: 12.4 K/UL (ref 3.6–11)

## 2017-06-13 PROCEDURE — 74011250637 HC RX REV CODE- 250/637: Performed by: OBSTETRICS & GYNECOLOGY

## 2017-06-13 PROCEDURE — 36415 COLL VENOUS BLD VENIPUNCTURE: CPT | Performed by: OBSTETRICS & GYNECOLOGY

## 2017-06-13 PROCEDURE — 85027 COMPLETE CBC AUTOMATED: CPT | Performed by: OBSTETRICS & GYNECOLOGY

## 2017-06-13 PROCEDURE — 80053 COMPREHEN METABOLIC PANEL: CPT | Performed by: OBSTETRICS & GYNECOLOGY

## 2017-06-13 RX ORDER — OXYCODONE HYDROCHLORIDE 5 MG/1
5 TABLET ORAL
Qty: 20 TAB | Refills: 0 | Status: SHIPPED | OUTPATIENT
Start: 2017-06-13 | End: 2018-01-04

## 2017-06-13 RX ADMIN — Medication 1 TABLET: at 10:48

## 2017-06-13 RX ADMIN — OXYCODONE HYDROCHLORIDE 5 MG: 5 TABLET ORAL at 06:50

## 2017-06-13 NOTE — PROGRESS NOTES
2347 - Verbal shift change report, due to pt sleeping, given to JENA Bolton RN (oncoming nurse) by Thais Emanuel RN (offgoing nurse). Report included the following information SBAR, Procedure Summary, Intake/Output, MAR and Recent Results.

## 2017-06-13 NOTE — DISCHARGE SUMMARY
Obstetrical Discharge Summary     Name: Stephy Ariza MRN: [id number]  SSN: [id number]    YOB: 1990  Age: 28 y.o. Sex: female      Admit Date: 6/3/2017    Discharge Date: 2017     Admitting Physician: Gladis Hannah MD     Attending Physician:  Gladis Hannah MD     * Admission Diagnoses: pregnancy  Preeclampsia    * Discharge Diagnoses:   Information for the patient's :  [facility] [[id number]]   Delivery of a 2.242 kg female infant via Vaginal, Spontaneous Delivery on 6/10/2017 at 6:19 PM  by . Apgars were 8 and 9. Additional Diagnoses:   Hospital Problems as of 2017  Never Reviewed          Codes Class Noted - Resolved POA    Preeclampsia ICD-10-CM: O14.90  ICD-9-CM: 642.40  6/3/2017 - Present Unknown             Lab Results   Component Value Date/Time    ABO/Rh(D) O POSITIVE 2017 08:47 PM    Rubella, External immune 2016    GrBStrep, External negative 2017    ABO,Rh o positive 2016    There is no immunization history for the selected administration types on file for this patient. * Procedures:  at 34 weeks for cholestasis of pregnancy and severe pre-eclampsia on 6/10/17. girl  * No surgery found *           * Discharge Condition: good    [facility] Hospital Course: Normal hospital course following the delivery. * Disposition: Home    Discharge Medications:   Current Discharge Medication List      START taking these medications    Details   oxyCODONE IR (ROXICODONE) 5 mg immediate release tablet Take 1 Tab by mouth every four (4) hours as needed. Max Daily Amount: 30 mg.  Qty: 20 Tab, Refills: 0         CONTINUE these medications which have NOT CHANGED    Details   PRENATAL VIT37/IRON/FOLIC ACID (PRENATA PO) Take  by mouth. STOP taking these medications       ursodiol (ACTIGALL) 500 mg tablet Comments:   Reason for Stopping:               * Follow-up Care/Patient Instructions:   Activity: No sex for 6 weeks and No driving while on analgesics  Diet: Regular Diet  Wound Care: None needed    Follow-up Information     Follow up With Details Comments Contact Info    Phys Other, MD   Patient can only remember the practice name and not the physician       In 2 weeks             Signed By:  Maikel Sierra DO     June 13, 2017

## 2017-06-13 NOTE — PROGRESS NOTES
0730  Bedside and Verbal shift change report given to ABBY Webb RN (oncoming nurse) by JENA Bolton RN (offgoing nurse). Report included the following information SBAR, Kardex, MAR and Recent Results. Hourly rounds [id number]  1200  Pt discharged to [facility], rx given, to follow up with Dr. Naina Orellana in 10 days.

## 2017-06-13 NOTE — PROGRESS NOTES
Post-Partum Day Number 3 Progress/Discharge Note    Patient doing well post-partum without significant complaint. Voiding without difficulty, normal lochia. Denies cp/sob/n/v.     Vitals:  Patient Vitals for the past 8 hrs:   BP Temp Pulse Resp   17 0638 145/90 98.3 °F (36.8 °C) 89 16     Temp (24hrs), Av.7 °F (37.1 °C), Min:98.3 °F (36.8 °C), Max:99 °F (37.2 °C)      Vital signs stable, afebrile. Exam:  Patient without distress. Heart regular rate and rhythm   Lungs CTA B/l               Abdomen soft, fundus firm at level below umbilicus, non tender               Lower extremities are negative for swelling, cords or tenderness. Lab/Data Review:  CMP:   Lab Results   Component Value Date/Time     2017 06:42 AM    K 3.9 2017 06:42 AM     2017 06:42 AM    CO2 24 2017 06:42 AM    AGAP 9 2017 06:42 AM    GLU 76 2017 06:42 AM    BUN 15 2017 06:42 AM    CREA 0.91 2017 06:42 AM    GFRAA >60 2017 06:42 AM    GFRNA >60 2017 06:42 AM    CA 8.7 2017 06:42 AM    ALB 2.3 (L) 2017 06:42 AM    TP 6.1 (L) 2017 06:42 AM    GLOB 3.8 2017 06:42 AM    AGRAT 0.6 (L) 2017 06:42 AM    SGOT 109 (H) 2017 06:42 AM     (H) 2017 06:42 AM     CBC:   Lab Results   Component Value Date/Time    WBC 12.4 (H) 2017 06:42 AM    HGB 11.7 2017 06:42 AM    HCT 34.4 (L) 2017 06:42 AM     2017 06:42 AM       Assessment and Plan:  Patient appears to be having uncomplicated post-partum course. Resolving severe pre-eclampsia and cholestasis. LFT's stable and creatinine improving. Continue routine perineal care and maternal education. Plan discharge for today with follow up in our office in 1-2 weeks for bp check. Girl in NICU.

## 2017-06-13 NOTE — PROGRESS NOTES
Bedside shift change report given to KIM Emanuel RN (oncoming nurse) by CONCHITA Castellon RN (offgoing nurse). Report included the following information SBAR, Intake/Output, MAR, Accordion and Recent Results.        Hourly rounds completed: [id number]

## 2017-06-13 NOTE — PROGRESS NOTES
Chart reviewed for transitions of care needs. Met with pt at the bedside this AM to introduce role of CM, offer support, and discuss potential discharge needs for mother and infant. Discharge order noted for pt(mother). Female infant Brie Laird) currently in NICU feeding and growing. Verified contact information and demographics. Pt is  Shukri Medrano/SUSHIL) [phone] and employed (). Pt stated her  is a  for [employer] and will be off this summer to be home with pt/infant. Pt plans to add baby to their [employer] [facility] [employer] plan. CM advised pt to contact insurance company within the first thirty days after delivery. Pt is also currently receiving short term disability benefits from her employer. Intact family- CM noted that this is the first child for patient. Pt identifies sufficient family support, stated her 's family all live nearby in [place]. Pt's family resides in [facility] but have already been to [place] to visit and plan to frequently/as needed. CM inquired about establishing a pediatrician. Pt already has resources identified and has a pediatrician in mind. Pt plans to make a decision within the next two days. Pt already has a car seat, crib, and breast pump at home for infant. No transportation concerns voiced. Pt to follow up with OB in two weeks. CM provided opportunity for questions/concerns. None voiced at this time. CM advised pt of parent progress communication sheets and process of submitting questions that may arise. Notified CM that pt will continue to follow infant while in the NICU and offer support during hospitalization. No anticipated needs noted at this time. Pt plans to room in with infant tonight. Care Management Interventions  Mode of Transport at Discharge:  Other (see comment) ( by car)  [place] [employer]: No  Discharge Durable Medical Equipment: No  Health Maintenance Reviewed: Yes  Physical Therapy Consult: No  Occupational Therapy Consult: No  Speech Therapy Consult: No  Current Support Network: Lives with Spouse, Own Home, Family Lives Nearby  Confirm Follow Up Transport: Family  Plan discussed with Pt/Family/Caregiver: Yes  Freedom of Choice Offered: Yes  Discharge Location  Discharge Placement: Home with family assistance    PATRICE Batista

## 2017-06-13 NOTE — DISCHARGE INSTRUCTIONS
POSTPARTUM DISCHARGE INSTRUCTIONS       Name:  Florencia Harrison  YOB: 1990  Admission Diagnosis:  pregnancy  Preeclampsia     Discharge Diagnosis:    Problem List as of 6/13/2017  Never Reviewed          Codes Class Noted - Resolved    Preeclampsia ICD-10-CM: O14.90  ICD-9-CM: 642.40  6/3/2017 - Present            Attending Physician:  Pili Riley MD    Delivery Type:  Vaginal Childbirth: What To Expect At Home    Your Recovery: Your body will slowly heal in the next few weeks. It is easy to get too tired and overwhelmed during the first weeks after your baby is born. Changes in your hormones can shift your mood without warning. You may find it hard to meet the extra demands on your energy and time. Take it easy on yourself. Follow-up care is a key part of your treatment and safety. Be sure to make and go to all appointments, and call your doctor if you are having problems. It's also a good idea to know your test results and keep a list of the medicines you take. How can you care for yourself at home? Vaginal bleeding and cramps  · After delivery, you will have a bloody discharge from the vagina. This will turn pink within a week and then white or yellow after about 10 days. It may last for 2 to 4 weeks or longer, until the uterus has healed. Use pads instead of tampons until you stop bleeding. · Do not worry if you pass some blood clots, as long as they are smaller than a golf ball. If you have a tear or stitches in your vaginal area, change the pad at least every 4 hours to prevent soreness and infection. · You may have cramps for the first few days after childbirth. These are normal and occur as the uterus shrinks to normal size. Take an over-the-counter pain medicine, such as acetaminophen (Tylenol), ibuprofen (Advil, Motrin), or naproxen (Aleve), for cramps. Read and follow all instructions on the label. Do not take aspirin, because it can cause more bleeding.   Do not take acetaminophen (Tylenol) and other acetaminophen containing medications (i.e. Percocet) at the same time. Breast fullness  · Your breasts may overfill (engorge) in the first few days after delivery. To help milk flow and to relieve pain, warm your breasts in the shower or by using warm, moist towels before nursing. · If you are not nursing, do not put warmth on your breasts or touch your breasts. Wear a tight bra or sports bra and use ice until the fullness goes away. This usually takes 2 to 3 days. · Put ice or a cold pack on your breast after nursing to reduce swelling and pain. Put a thin cloth between the ice and your skin. Activity  · Eat a balanced diet. Do not try to lose weight by cutting calories. Keep taking your prenatal vitamins, or take a multivitamin. · Get as much rest as you can. Try to take naps when your baby sleeps during the day. · Get some exercise every day. But do not do any heavy exercise until your doctor says it is okay. · Wait until you are healed (about 4 to 6 weeks) before you have sexual intercourse. Your doctor will tell you when it is okay to have sex. · Talk to your doctor about birth control. You can get pregnant even before your period returns. Also, you can get pregnant while you are breast-feeding. Mental Health  · Many women get the \"baby blues\" during the first few days after childbirth. You may lose sleep, feel irritable, and cry easily. You may feel happy one minute and sad the next. Hormone changes are one cause of these emotional changes. Also, the demands of a new baby, along with visits from relatives or other family needs, add to a mother's stress. The \"baby blues\" often peak around the fourth day. Then they ease up in less than 2 weeks. · If your moodiness or anxiety lasts for more than 2 weeks, or if you feel like life is not worth living, you may have postpartum depression. This is different for each mother.  Some mothers with serious depression may worry intensely about their infant's well-being. Others may feel distant from their child. Some mothers might even feel that they might harm their baby. A mother may have signs of paranoia, wondering if someone is watching her. · With all the changes in your life, you may not know if you are depressed. Pregnancy sometimes causes changes in how you feel that are similar to the symptoms of depression. · Symptoms of depression include:  · Feeling sad or hopeless and losing interest in daily activities. These are the most common symptoms of depression. · Sleeping too much or not enough. · Feeling tired. You may feel as if you have no energy. · Eating too much or too little. · POSTPARTUM [employer]PPORT [employer] ([employer]I) offers a Warm line; Chat with the Expert phone sessions; Information and Articles about Pregnancy and Postpartum Mood Disorders; Comprehensive List of Free Support Groups; Knowledgeable local coordinators who will offer support, information, and resources; Guide to Resources on [employer]; Calendar of events in the  mood disorders community; Latest News and Research; and [facility] & [facility] [address] for [employer]. Remember - You are not alone; You are not to blame; With help, you will be well. 1-590-036-PPD(8325). WWW. POSTPARTUM. NET   · Writing or talking about death, such as writing suicide notes or talking about guns, knives, or pills. Keep the numbers for these national suicide hotlines: 7-664-665-TALK (1-626.722.2045) and 7-545-MTULEFG (0-916.131.7251). If you or someone you know talks about suicide or feeling hopeless, get help right away. Constipation and Hemorrhoids  · Drink plenty of fluids, enough so that your urine is light yellow or clear like water. If you have kidney, heart, or liver disease and have to limit fluids, talk with your doctor before you increase the amount of fluids you drink. · Eat plenty of fiber each day.  Have a bran muffin or bran cereal for breakfast, and try eating a piece of fruit for a mid-afternoon snack. · For painful, itchy hemorrhoids, put ice or a cold pack on the area several times a day for 10 minutes at a time. Follow this by putting a warm compress on the area for another 10 to 20 minutes or by sitting in a shallow, warm bath. When should you call for help? Call 911 anytime you think you may need emergency care. For example, call if:  · You are thinking of hurting yourself, your baby, or anyone else. · You passed out (lost consciousness). · You have symptoms of a blood clot in your lung (called a pulmonary embolism). These may include:    · Sudden chest pain. · Trouble breathing. · Coughing up blood. Call your doctor now or seek immediate medical care if:  · You have severe vaginal bleeding. · You are soaking through a pad each hour for 2 or more hours. · Your vaginal bleeding seems to be getting heavier or is still bright red 4 days after delivery. · You are dizzy or lightheaded, or you feel like you may faint. · You are vomiting or cannot keep fluids down. · You have a fever. · You have new or more belly pain. · You pass tissue (not just blood). · Your vaginal discharge smells bad. · Your belly feels tender or full and hard. · Your breasts are continuously painful or red. · You feel sad, anxious, or hopeless for more than a few days. · You have sudden, severe pain in your belly. · You have symptoms of a blood clot in your leg (called a deep vein thrombosis),          such as:  · Pain in your calf, back of the knee, thigh, or groin. · Redness and swelling in your leg or groin. · You have symptoms of preeclampsia, such as:  · Sudden swelling of your face, hands, or feet. · New vision problems (such as dimness or blurring). · A severe headache. · Your blood pressure is higher than it should be or rises suddenly. · You have new nausea or vomiting.     Watch closely for changes in your health, and be sure to contact your doctor if you have any problems. Additional Information:  Learning About Hypertensive Disorders After Childbirth    What is preeclampsia? A woman with preeclampsia has blood pressure that is higher than usual. She may also have other serious symptoms. Preeclampsia can be dangerous. When it is severe, it can cause seizures (eclampsia) or liver or kidney damage. When the liver is affected, some women get HELLP syndrome, a blood-clotting and bleeding problem. HELLP can come on quickly and can be deadly. This is why your doctor checks you and your baby often. Preeclampsia usually occurs after 20 weeks of pregnancy. In rare cases, it is first noted right after childbirth. Most often, it starts near the end of pregnancy and goes away after childbirth. What are the symptoms? Mild preeclampsia usually doesn't cause symptoms. But preeclampsia can cause rapid weight gain and sudden swelling of the hands and face. Severe preeclampsia does cause symptoms. It can cause a very bad headache and trouble seeing and breathing. It also can cause belly pain. You may also urinate less than usual.    If you have new preeclampsia symptoms after you go home from the hospital, call your doctor right away. What can you expect after you have had preeclampsia? In the hospital  After the baby and the placenta are delivered, preeclampsia usually starts to improve. Most women get better in the first few days after childbirth. After having preeclampsia, you still have a risk of seizures for a day or more after childbirth. (Very rarely, seizures happen later on.) So your doctor may have you take magnesium sulfate for a day or more to prevent seizures. You may also take medicine to lower your blood pressure. When you go home  Your blood pressure will most likely return to normal a few days after delivery.  Your doctor will want to check your blood pressure sometime in the first week after you leave the hospital.    Some women still have high blood pressure 6 weeks after childbirth. But most return to normal levels over the long term. · Take and record your blood pressure at home if your doctor tells you to. · Learn the importance of the two measures of blood pressure (such as 120 over 80, or 120/80). The first number is the systolic pressure. This is the force of blood on the artery walls as the heart pumps. The second number is the diastolic pressure. This is the force of blood on the artery walls between heartbeats, when the heart is at rest. You have a choice of monitors to use. Manual monitor: You pump up the cuff and use a stethoscope to listen for your  Pulse. · Electronic monitor: The cuff inflates, and a gauge shows your pulse rate. · To take your blood pressure:  · Ask your doctor to check your blood pressure monitor to be sure that it is accurate and that the cuff fits you. Also ask your doctor to watch you use it, to make sure that you are using it right. · You should not eat, use tobacco products, or use medicine known to raise blood pressure (such as some nasal decongestant sprays) before you take your blood pressure. · Avoid taking your blood pressure if you have just exercised or are nervous or upset. Rest at least 15 minutes before you take your blood pressure. · Be safe with medicines. If you take medicine, take it exactly as prescribed. Call your doctor if you think you are having a problem with your medicine. · Do not smoke. Quitting smoking will help lower your blood pressure and improve your baby's growth and health. If you need help quitting, talk to your doctor about stop-smoking programs and medicines. These can increase your chances of quitting for good. · Eat a balanced and healthy diet that has lots of fruits and vegetables. Long-term health   After you have had preeclampsia, you have a higher-than-average risk of heart disease, stroke, and kidney disease.  This may be because the same things that cause preeclampsia also cause heart and kidney disease. To protect your health, work with your doctor on living a heart-healthy lifestyle and getting the checkups you need. Your doctor may also want you to check your blood pressure at home. Follow-up care is a key part of your treatment and safety. Be sure to make and go to all appointments, and call your doctor if you are having problems. It's also a good idea to know your test results and keep a list of the medicines you take. Postpartum Support    PARENTS:  Are you feeling sad or depressed? Is it difficult for you to enjoy yourself? Do you feel more irritable or tense? Do you feel anxious or panicky? Are you having difficulty bonding with your baby? Do you feel as if you are \"out of control\" or \"going crazy\"? Are you worried that you might hurt your baby or yourself? FAMILIES: Do you worry that something is wrong but don't know how to help? Do you think that your partner or spouse is having problems coping? Are you worried that it may never get better? While many women experience some mild mood change or \"the blues\" during or after the birth of a child, 1 in 9 women experience more significant symptoms of depression or anxiety. 1 in 10 Dads become depressed during the first year. Things you can do  Being a good parent includes taking care of yourself. If you take care of yourself, you will be able to take better care of your baby and your family. · Talk to a counselor or healthcare provider who has training in  mood and anxiety problems. · Learn as much as you can about pregnancy and postpartum depression and anxiety. · Get support from family and friends. Ask for help when you need it. · Join a support group in your area or online. · Keep active by walking, stretching or whatever form of exercise helps you to feel better. · Get enough rest and time for yourself. · Eat a healthy diet. · Don't give up!  It may take more than one try to get the right help you need. These are general instructions for a healthy lifestyle:    No smoking/ No tobacco products/ Avoid exposure to second hand smoke    Surgeon General's Warning:  Quitting smoking now greatly reduces serious risk to your health. Obesity, smoking, and sedentary lifestyle greatly increases your risk for illness    A healthy diet, regular physical exercise & weight monitoring are important for maintaining a healthy lifestyle    Recognize signs and symptoms of STROKE:    F-face looks uneven    A-arms unable to move or move unevenly    S-speech slurred or non-existent    T-time-call 911 as soon as signs and symptoms begin - DO NOT go       back to bed or wait to see if you get better - TIME IS BRAIN. I have had the opportunity to make my options or choices for discharge. I have received and understand these instructions.

## 2018-01-04 ENCOUNTER — HOSPITAL ENCOUNTER (EMERGENCY)
Age: 28
Discharge: HOME | End: 2018-01-04
Attending: FAMILY MEDICINE

## 2018-01-04 VITALS
HEART RATE: 85 BPM | WEIGHT: 178 LBS | TEMPERATURE: 97.3 F | OXYGEN SATURATION: 98 % | BODY MASS INDEX: 29.66 KG/M2 | HEIGHT: 65 IN | RESPIRATION RATE: 18 BRPM | DIASTOLIC BLOOD PRESSURE: 80 MMHG | SYSTOLIC BLOOD PRESSURE: 124 MMHG

## 2018-01-04 DIAGNOSIS — H53.9 CHANGE IN VISION: Primary | ICD-10-CM

## 2018-01-04 NOTE — UC PROVIDER NOTE
Patient is a 28 y.o. female presenting with visual disturbance. The history is provided by the patient. Vision Change    This is a new problem. Episode onset: 2 weeks ago, when reading close sess overlap of the letters/object; saw optometrist and neurologist  with no definitive dx or relief. The problem occurs constantly. The problem has not changed since onset. Both eyes are affected. The patient is experiencing no pain. Pertinent negatives include no discharge, no photophobia, no eye redness, no itching, no pain and no blindness. She has tried nothing for the symptoms. Past Medical History:   Diagnosis Date    Preeclampsia 6/3/2017        Past Surgical History:   Procedure Laterality Date    HX OTHER SURGICAL           Family History   Problem Relation Age of Onset    Hypertension Mother     Hypertension Father     Cancer Father         Social History     Social History    Marital status:      Spouse name: N/A    Number of children: N/A    Years of education: N/A     Occupational History    Not on file. Social History Main Topics    Smoking status: Never Smoker    Smokeless tobacco: Not on file    Alcohol use Yes      Comment: 2-3 glasses per week prior to pregnancy    Drug use: No    Sexual activity: Yes     Partners: Male     Other Topics Concern    Not on file     Social History Narrative                ALLERGIES: Pcn [penicillins]    Review of Systems   Eyes: Positive for visual disturbance. Negative for blindness, photophobia, pain, discharge, redness and itching. Skin: Negative for itching. Neurological: Positive for headaches (slight). Vitals:    01/04/18 1632 01/04/18 1645   BP: (!) 161/92 124/80   Pulse: (!) 129 85   Resp: 18    Temp: 97.3 °F (36.3 °C)    SpO2: 98%    Weight: 80.7 kg (178 lb)    Height: 5' 5\" (1.651 m)        Physical Exam   Constitutional: She appears well-developed and well-nourished. No distress.    Eyes: Conjunctivae and EOM are normal. Pupils are equal, round, and reactive to light. Cardiovascular: Normal rate, regular rhythm and normal heart sounds. Neurological: She is alert. No cranial nerve deficit. Coordination normal.   Skin: She is not diaphoretic. Psychiatric: She has a normal mood and affect. Her behavior is normal. Judgment and thought content normal.   Nursing note and vitals reviewed. MDM     Differential Diagnosis; Clinical Impression; Plan:     CLINICAL IMPRESSION:  Change in vision  (primary encounter diagnosis)    Plan:  1. ER if worse  2. F/u with ophthalmologist  Risk of Significant Complications, Morbidity, and/or Mortality:   Presenting problems: Moderate  Management options:   Moderate  Progress:   Patient progress:  Stable      Procedures

## 2018-08-14 ENCOUNTER — OFFICE VISIT (OUTPATIENT)
Dept: URGENT CARE | Age: 28
End: 2018-08-14

## 2018-08-14 VITALS
RESPIRATION RATE: 18 BRPM | BODY MASS INDEX: 28.32 KG/M2 | HEART RATE: 89 BPM | SYSTOLIC BLOOD PRESSURE: 171 MMHG | OXYGEN SATURATION: 99 % | DIASTOLIC BLOOD PRESSURE: 86 MMHG | TEMPERATURE: 97.8 F | WEIGHT: 170 LBS | HEIGHT: 65 IN

## 2018-08-14 DIAGNOSIS — R11.0 NAUSEA: ICD-10-CM

## 2018-08-14 DIAGNOSIS — N39.0 ACUTE UTI: ICD-10-CM

## 2018-08-14 DIAGNOSIS — R00.2 PALPITATIONS: ICD-10-CM

## 2018-08-14 DIAGNOSIS — Z86.59 HISTORY OF RECENT STRESSFUL LIFE EVENT: Primary | ICD-10-CM

## 2018-08-14 LAB
BILIRUB UR QL STRIP: NEGATIVE
GLUCOSE UR-MCNC: NEGATIVE MG/DL
HCG URINE, QL. (POC): NEGATIVE
KETONES P FAST UR STRIP-MCNC: NEGATIVE MG/DL
PH UR STRIP: 7 [PH] (ref 4.6–8)
PROT UR QL STRIP: NEGATIVE
SP GR UR STRIP: 1.01 (ref 1–1.03)
UA UROBILINOGEN AMB POC: NORMAL (ref 0.2–1)
URINALYSIS CLARITY POC: NORMAL
URINALYSIS COLOR POC: NORMAL
URINE BLOOD POC: NEGATIVE
URINE LEUKOCYTES POC: NORMAL
URINE NITRITES POC: NEGATIVE
VALID INTERNAL CONTROL?: YES

## 2018-08-14 RX ORDER — NITROFURANTOIN 25; 75 MG/1; MG/1
100 CAPSULE ORAL 2 TIMES DAILY
Qty: 10 CAP | Refills: 0 | Status: SHIPPED | OUTPATIENT
Start: 2018-08-14 | End: 2018-08-19

## 2018-08-14 NOTE — PATIENT INSTRUCTIONS
You need to see a Primary Care Doctor within 1 week for follow up blood work including CMP and thyroid check. For any new, worsening or changes, go to the Emergency Department immediately         Learning About Stress  What is stress? Stress is what you feel when you have to handle more than you are used to. Stress is a fact of life for most people, and it affects everyone differently. What causes stress for you may not be stressful for someone else. A lot of things can cause stress. You may feel stress when you go on a job interview, take a test, or run a race. This kind of short-term stress is normal and even useful. It can help you if you need to work hard or react quickly. For example, stress can help you finish an important job on time. Stress also can last a long time. Long-term stress is caused by stressful situations or events. Examples of long-term stress include long-term health problems, ongoing problems at work, or conflicts in your family. Long-term stress can harm your health. How does stress affect your health? When you are stressed, your body responds as though you are in danger. It makes hormones that speed up your heart, make you breathe faster, and give you a burst of energy. This is called the fight-or-flight stress response. If the stress is over quickly, your body goes back to normal and no harm is done. But if stress happens too often or lasts too long, it can have bad effects. Long-term stress can make you more likely to get sick, and it can make symptoms of some diseases worse. If you tense up when you are stressed, you may develop neck, shoulder, or low back pain. Stress is linked to high blood pressure and heart disease. Stress also harms your emotional health. It can make you whitaker, tense, or depressed. Your relationships may suffer, and you may not do well at work or school. What can you do to manage stress? How to relax your mind  · Write.  It may help to write about things that are bothering you. This helps you find out how much stress you feel and what is causing it. When you know this, you can find better ways to cope. · Let your feelings out. Talk, laugh, cry, and express anger when you need to. Talking with friends, family, a counselor, or a member of the clergy about your feelings is a healthy way to relieve stress. · Do something you enjoy. For example, listen to music or go to a movie. Practice your hobby or do volunteer work. · Meditate. This can help you relax, because you are not worrying about what happened before or what may happen in the future. · Do guided imagery. Imagine yourself in any setting that helps you feel calm. You can use audiotapes, books, or a teacher to guide you. How to relax your body  · Do something active. Exercise or activity can help reduce stress. Walking is a great way to get started. Even everyday activities such as housecleaning or yard work can help. · Do breathing exercises. For example:  ¨ From a standing position, bend forward from the waist with your knees slightly bent. Let your arms dangle close to the floor. ¨ Breathe in slowly and deeply as you return to a standing position. Roll up slowly and lift your head last.  ¨ Hold your breath for just a few seconds in the standing position. ¨ Breathe out slowly and bend forward from the waist.  · Try yoga or jimmie chi. These techniques combine exercise and meditation. You may need some training at first to learn them. What can you do to prevent stress? · Manage your time. This helps you find time to do the things you want and need to do. · Get enough sleep. Your body recovers from the stresses of the day while you are sleeping. · Get support. Your family, friends, and community can make a difference in how you experience stress. Where can you learn more? Go to http://natalie-morenita.info/.   Enter A612 in the search box to learn more about \"Learning About Stress. \"  Current as of: October 10, 2017  Content Version: 11.7  © 0481-8629 [employer], [facility]. Care instructions adapted under license by [employer] (which disclaims liability or warranty for this information). If you have questions about a medical condition or this instruction, always ask your healthcare professional. [place] [address] any warranty or liability for your use of this information.

## 2018-08-14 NOTE — PROGRESS NOTES
HPI Comments:   Here for 2 days of nausea  Associated feelings of anxiousness. With these feelings she has experienced palpitations. She has been told in past by a counselor that she may have anxiety but has never been treated for this. She has been recently stressed; she started a new job 2 weeks ago. No aggravating or alleviating factors. Overall not improving. Denies: any heart conditions  She is not a smoker  Drinks 3 cups of coffee per day. No significant cardiac fam med hx. Has seen neurology a few times for visual disturbance without particular diagnosis. Patient is a 33 y.o. female presenting with dizziness. Dizziness          Past Medical History:   Diagnosis Date    Preeclampsia 6/3/2017        Past Surgical History:   Procedure Laterality Date    HX OTHER SURGICAL           Family History   Problem Relation Age of Onset    Hypertension Mother     Hypertension Father     Cancer Father         Social History     Social History    Marital status:      Spouse name: N/A    Number of children: N/A    Years of education: N/A     Occupational History    Not on file. Social History Main Topics    Smoking status: Never Smoker    Smokeless tobacco: Never Used    Alcohol use Yes      Comment: 2-3 glasses per week prior to pregnancy    Drug use: No    Sexual activity: Yes     Partners: Male     Other Topics Concern    Not on file     Social History Narrative                ALLERGIES: Pcn [penicillins]    Review of Systems   Neurological: Positive for dizziness. Vitals:    08/14/18 1728   BP: 171/86   Pulse: 89   Resp: 18   Temp: 97.8 °F (36.6 °C)   SpO2: 99%   Weight: 170 lb (77.1 kg)   Height: 5' 5\" (1.651 m)       Physical Exam   Constitutional: She is oriented to person, place, and time. No distress. Appears well and well hydrated   HENT:   Head: Normocephalic and atraumatic. Mouth/Throat: Oropharynx is clear and moist. No oropharyngeal exudate.    TMs normal Eyes: Conjunctivae and EOM are normal. Pupils are equal, round, and reactive to light. Neck: Normal range of motion. No thyromegaly present. Cardiovascular: Normal rate, regular rhythm and normal heart sounds. Exam reveals no gallop and no friction rub. No murmur heard. Pulmonary/Chest: Effort normal and breath sounds normal. No respiratory distress. She has no wheezes. She has no rales. Abdominal: Soft. Bowel sounds are normal. She exhibits no distension and no mass. There is no tenderness. There is no rebound and no guarding. Lymphadenopathy:     She has no cervical adenopathy. Neurological: She is alert and oriented to person, place, and time. Skin: Skin is warm and dry. No rash noted. She is not diaphoretic. Psychiatric: She has a normal mood and affect. Her behavior is normal. Thought content normal.       MDM     Differential Diagnosis; Clinical Impression; Plan:       CLINICAL IMPRESSION:  (Z86.59) History of recent stressful life event  (primary encounter diagnosis)  (R11.0) Nausea  (R00.2) Palpitations  (N39.0) Acute UTI    Orders Placed This Encounter      AMB POC URINE PREGNANCY TEST, VISUAL COLOR COMPARISON      AMB POC URINALYSIS DIP STICK AUTO W/O MICRO      AMB POC EKG ROUTINE W/ 12 LEADS, INTER & REP  RX      nitrofurantoin, macrocrystal-monohydrate, (MACROBID) 100 mg capsule    Suspect anxiety may play role as patient is promoting symptoms today with known life stressor (new job). Organic causes such as thyroid dysfunction should be r/o and have advised follow up with PCP within 1 week for further eval.    Plan:  1. Trace GARCÍA on UA, will send in antibiotic. 2. Maintain adequate fluid intake  3. Reduce caffeine intake  4. Stress reduction techniques  5. EKG NSR, no ectopy with no evidence of ACS. We have reviewed concerning signs/symptoms, normal vs abnormal progression of medical condition and when to seek immediate medical attention.   Schedule with PCP or Urgent Care immediately for worsening or new symptoms. ED immediately for new, worsening or changes. You should see your PCP for updates on your routine health maintenance.      Results for orders placed or performed in visit on 08/14/18  -AMB POC URINE PREGNANCY TEST, VISUAL COLOR COMPARISON       Result                                            Value                         Ref Range                       VALID INTERNAL CONTROL POC                        Yes                                                           HCG urine, Ql. (POC)                              Negative                      Negative                   -AMB POC URINALYSIS DIP STICK AUTO W/O MICRO       Result                                            Value                         Ref Range                       Color (UA POC)                                                                                                  Clarity (UA POC)                                                                                                Glucose (UA POC)                                  Negative                      Negative                        Bilirubin (UA POC)                                Negative                      Negative                        Ketones (UA POC)                                  Negative                      Negative                        Specific gravity (UA POC)                         1.015                         1.001 - 1.035                   Blood (UA POC)                                    Negative                      Negative                        pH (UA POC)                                       7.0                           4.6 - 8.0                       Protein (UA POC)                                  Negative                      Negative                        Urobilinogen (UA POC)                             0.2 mg/dL                     0.2 - 1                         Nitrites (UA POC) Negative                      Negative                        Leukocyte esterase (UA POC)                       Trace                         Negative                         Procedures

## 2018-08-14 NOTE — MR AVS SNAPSHOT
[address] [address] 
[phone] Patient: Cami Griffith MRN: [id number] CJD:[id number] Visit Information Date & Time Provider Department Dept. Phone Encounter #  
 8/14/2018  5:15 PM [facility] [phone] [id number] Upcoming Health Maintenance Date Due DTaP/Tdap/Td series (1 - Tdap) 2/6/2010 PAP AKA CERVICAL CYTOLOGY 2/6/2010 Influenza Age 5 to Adult 8/1/2018 Allergies as of 8/14/2018  Review Complete On: 8/14/2018 By: Harshal Grajeda NP Severity Noted Reaction Type Reactions Pcn [Penicillins]  11/15/2016    Unknown (comments) Current Immunizations  Never Reviewed No immunizations on file. Not reviewed this visit You Were Diagnosed With   
  
 Codes Comments History of recent stressful life event    -  Primary ICD-10-CM: Z86.59 
ICD-9-CM: V11.8 Nausea     ICD-10-CM: R11.0 ICD-9-CM: 787.02 Palpitations     ICD-10-CM: R00.2 ICD-9-CM: 785.1 Acute UTI     ICD-10-CM: N39.0 ICD-9-CM: 599.0 Vitals BP Pulse Temp Resp Height(growth percentile) Weight(growth percentile) 171/86 89 97.8 °F (36.6 °C) 18 5' 5\" (1.651 m) 170 lb (77.1 kg) SpO2 BMI OB Status Smoking Status 99% 28.29 kg/m2 IUD Never Smoker BMI and BSA Data Body Mass Index Body Surface Area  
 28.29 kg/m 2 1.88 m 2 Preferred Pharmacy Pharmacy Name Phone [facility], [address] [place] [address] AT [address] [place] [address] Drive [phone] Your Updated Medication List  
  
   
This list is accurate as of 8/14/18  6:05 PM.  Always use your most recent med list.  
  
  
  
  
 nitrofurantoin (macrocrystal-monohydrate) 100 mg capsule Commonly known as:  MACROBID Take 1 Cap by mouth two (2) times a day for 5 days. Prescriptions Sent to Pharmacy Refills nitrofurantoin, macrocrystal-monohydrate, (MACROBID) 100 mg capsule 0 Sig: Take 1 Cap by mouth two (2) times a day for 5 days. Class: Normal  
 [facility] [address] AT [address] Ph #: [phone] Route: Oral  
  
We Performed the Following AMB POC EKG ROUTINE W/ 12 LEADS, INTER & REP [25391 CPT(R)] AMB POC URINALYSIS DIP STICK AUTO W/O MICRO [80458 CPT(R)] AMB POC URINE PREGNANCY TEST, VISUAL COLOR COMPARISON [57731 CPT(R)] Patient Instructions You need to see a Primary Care Doctor within 1 week for follow up blood work including thyroid check. For any new, worsening or changes, go to the Emergency Department immediately Learning About Stress What is stress? Stress is what you feel when you have to handle more than you are used to. Stress is a fact of life for most people, and it affects everyone differently. What causes stress for you may not be stressful for someone else. A lot of things can cause stress. You may feel stress when you go on a job interview, take a test, or run a race. This kind of short-term stress is normal and even useful. It can help you if you need to work hard or react quickly. For example, stress can help you finish an important job on time. Stress also can last a long time. Long-term stress is caused by stressful situations or events. Examples of long-term stress include long-term health problems, ongoing problems at work, or conflicts in your family. Long-term stress can harm your health. How does stress affect your health? When you are stressed, your body responds as though you are in danger. It makes hormones that speed up your heart, make you breathe faster, and give you a burst of energy. This is called the fight-or-flight stress response. If the stress is over quickly, your body goes back to normal and no harm is done. But if stress happens too often or lasts too long, it can have bad effects. Long-term stress can make you more likely to get sick, and it can make symptoms of some diseases worse. If you tense up when you are stressed, you may develop neck, shoulder, or low back pain. Stress is linked to high blood pressure and heart disease. Stress also harms your emotional health. It can make you whitaker, tense, or depressed. Your relationships may suffer, and you may not do well at work or school. What can you do to manage stress? How to relax your mind · Write. It may help to write about things that are bothering you. This helps you find out how much stress you feel and what is causing it. When you know this, you can find better ways to cope. · Let your feelings out. Talk, laugh, cry, and express anger when you need to. Talking with friends, family, a counselor, or a member of the clergy about your feelings is a healthy way to relieve stress. · Do something you enjoy. For example, listen to music or go to a movie. Practice your hobby or do volunteer work. · Meditate. This can help you relax, because you are not worrying about what happened before or what may happen in the future. · Do guided imagery. Imagine yourself in any setting that helps you feel calm. You can use audiotapes, books, or a teacher to guide you. How to relax your body · Do something active. Exercise or activity can help reduce stress. Walking is a great way to get started. Even everyday activities such as housecleaning or yard work can help. · Do breathing exercises. For example: ¨ From a standing position, bend forward from the waist with your knees slightly bent. Let your arms dangle close to the floor. ¨ Breathe in slowly and deeply as you return to a standing position. Roll up slowly and lift your head last. 
¨ Hold your breath for just a few seconds in the standing position.  
¨ Breathe out slowly and bend forward from the waist. 
 · Try yoga or jimmie chi. These techniques combine exercise and meditation. You may need some training at first to learn them. What can you do to prevent stress? · Manage your time. This helps you find time to do the things you want and need to do. · Get enough sleep. Your body recovers from the stresses of the day while you are sleeping. · Get support. Your family, friends, and community can make a difference in how you experience stress. Where can you learn more? Go to http://natalie-morenita.info/. Enter J333 in the search box to learn more about \"Learning About Stress. \" Current as of: October 10, 2017 Content Version: 11.7 © 3351-2201 [employer]. Care instructions adapted under license by [employer] (which disclaims liability or warranty for this information). If you have questions about a medical condition or this instruction, always ask your healthcare professional. [address] any warranty or liability for your use of this information. Introducing [facility]! Dear Sergio Fausture: Thank you for requesting a [employer] account. Our records indicate that you already have an active [employer] account. You can access your account anytime at [email or web address] Did you know that you can access your hospital and ER discharge instructions at any time in [employer]? You can also review all of your test results from your hospital stay or ER visit. Additional Information If you have questions, please visit the Frequently Asked Questions section of the [employer] website at [email or web address] Remember, [employer] is NOT to be used for urgent needs. For medical emergencies, dial 911. Now available from your iPhone and Android! Please provide this summary of care documentation to your next provider. Your primary care clinician is listed as Phys Other.  If you have any questions after today's visit, please call [phone].

## 2018-10-31 ENCOUNTER — OFFICE VISIT (OUTPATIENT)
Dept: URGENT CARE | Age: 28
End: 2018-10-31

## 2018-10-31 VITALS
HEIGHT: 65 IN | TEMPERATURE: 98.9 F | RESPIRATION RATE: 18 BRPM | BODY MASS INDEX: 28.82 KG/M2 | SYSTOLIC BLOOD PRESSURE: 163 MMHG | OXYGEN SATURATION: 98 % | DIASTOLIC BLOOD PRESSURE: 87 MMHG | HEART RATE: 101 BPM | WEIGHT: 173 LBS

## 2018-10-31 DIAGNOSIS — J20.8 ACUTE VIRAL BRONCHITIS: Primary | ICD-10-CM

## 2018-10-31 RX ORDER — CODEINE PHOSPHATE AND GUAIFENESIN 10; 100 MG/5ML; MG/5ML
5 SOLUTION ORAL
Qty: 120 ML | Refills: 0 | Status: SHIPPED | OUTPATIENT
Start: 2018-10-31 | End: 2019-07-22

## 2018-10-31 RX ORDER — BENZONATATE 200 MG/1
200 CAPSULE ORAL
Qty: 21 CAP | Refills: 0 | Status: SHIPPED | OUTPATIENT
Start: 2018-10-31 | End: 2018-11-07

## 2018-10-31 RX ORDER — PREDNISONE 10 MG/1
TABLET ORAL
Qty: 21 TAB | Refills: 0 | Status: SHIPPED | OUTPATIENT
Start: 2018-10-31 | End: 2018-11-07

## 2018-10-31 NOTE — PATIENT INSTRUCTIONS

## 2018-10-31 NOTE — PROGRESS NOTES
Cough   The history is provided by the patient. This is a new problem. The current episode started more than 2 days ago. The problem occurs every few minutes. The problem has not changed since onset. The cough is non-productive. Patient reports a subjective fever - was not measured. Associated symptoms include sore throat. Pertinent negatives include no chest pain, no chills, no rhinorrhea, no myalgias, no wheezing and no nausea. She has tried nothing for the symptoms. She is not a smoker. Her past medical history does not include pneumonia or asthma. Past Medical History:   Diagnosis Date    Preeclampsia 6/3/2017        Past Surgical History:   Procedure Laterality Date    HX OTHER SURGICAL           Family History   Problem Relation Age of Onset    Hypertension Mother     Hypertension Father     Cancer Father         Social History     Socioeconomic History    Marital status:      Spouse name: Not on file    Number of children: Not on file    Years of education: Not on file    Highest education level: Not on file   Social Needs    Financial resource strain: Not on file    Food insecurity - worry: Not on file    Food insecurity - inability: Not on file   [employer] needs - medical: Not on file   [employer] needs - non-medical: Not on file   Occupational History    Not on file   Tobacco Use    Smoking status: Never Smoker    Smokeless tobacco: Never Used   Substance and Sexual Activity    Alcohol use: Yes     Comment: 2-3 glasses per week prior to pregnancy    Drug use: No    Sexual activity: Yes     Partners: Male   Other Topics Concern    Not on file   Social History Narrative    Not on file                ALLERGIES: Pcn [penicillins]    Review of Systems   Constitutional: Negative for chills and fever. HENT: Positive for congestion, sinus pressure and sore throat. Negative for rhinorrhea. Respiratory: Positive for cough. Negative for wheezing.     Cardiovascular: Negative for chest pain. Gastrointestinal: Negative for nausea. Musculoskeletal: Negative for myalgias. All other systems reviewed and are negative. Vitals:    10/31/18 1551   BP: 163/87   Pulse: (!) 101   Resp: 18   Temp: 98.9 °F (37.2 °C)   SpO2: 98%   Weight: 173 lb (78.5 kg)   Height: 5' 5\" (1.651 m)       Physical Exam   Constitutional: No distress. HENT:   Right Ear: Tympanic membrane and ear canal normal.   Left Ear: Tympanic membrane and ear canal normal.   Nose: Nose normal.   Mouth/Throat: No oropharyngeal exudate, posterior oropharyngeal edema or posterior oropharyngeal erythema. Eyes: Conjunctivae are normal. Right eye exhibits no discharge. Left eye exhibits no discharge. Neck: Neck supple. Pulmonary/Chest: Effort normal and breath sounds normal. No respiratory distress. She has no wheezes. She has no rales. Lymphadenopathy:     She has no cervical adenopathy. Skin: No rash noted. Nursing note and vitals reviewed. MDM    Procedures      ICD-10-CM ICD-9-CM    1. Acute viral bronchitis J20.8 466.0 guaiFENesin-codeine (ROBITUSSIN AC) 100-10 mg/5 mL solution     Medications Ordered Today   Medications    benzonatate (TESSALON) 200 mg capsule     Sig: Take 1 Cap by mouth three (3) times daily as needed for Cough for up to 7 days. Dispense:  21 Cap     Refill:  0    predniSONE (STERAPRED DS) 10 mg dose pack     Sig: As directed     Dispense:  21 Tab     Refill:  0    guaiFENesin-codeine (ROBITUSSIN AC) 100-10 mg/5 mL solution     Sig: Take 5 mL by mouth three (3) times daily as needed for Cough. Max Daily Amount: 15 mL. Dispense:  120 mL     Refill:  0     No results found for any visits on 10/31/18. The patients condition was discussed with the patient and they understand. The patient is to follow up with primary care doctor. If signs and symptoms become worse the pt is to go to the ER. The patient is to take medications as prescribed.

## 2018-11-07 ENCOUNTER — OFFICE VISIT (OUTPATIENT)
Dept: URGENT CARE | Age: 28
End: 2018-11-07

## 2018-11-07 VITALS
SYSTOLIC BLOOD PRESSURE: 140 MMHG | DIASTOLIC BLOOD PRESSURE: 84 MMHG | RESPIRATION RATE: 18 BRPM | WEIGHT: 172 LBS | BODY MASS INDEX: 28.66 KG/M2 | HEIGHT: 65 IN | OXYGEN SATURATION: 98 % | TEMPERATURE: 97.6 F | HEART RATE: 95 BPM

## 2018-11-07 DIAGNOSIS — J02.9 SORE THROAT: ICD-10-CM

## 2018-11-07 DIAGNOSIS — R68.89 FLU-LIKE SYMPTOMS: Primary | ICD-10-CM

## 2018-11-07 LAB
FLUAV+FLUBV AG NOSE QL IA.RAPID: NEGATIVE POS/NEG
FLUAV+FLUBV AG NOSE QL IA.RAPID: NEGATIVE POS/NEG
S PYO AG THROAT QL: NEGATIVE
VALID INTERNAL CONTROL?: YES
VALID INTERNAL CONTROL?: YES

## 2018-11-07 NOTE — LETTER
[address] [address] 
[phone] Work/School Note Date: 11/7/2018 To Whom It May concern: 
 
Adalgisa Amato was seen and treated today in the urgent care center. Adalgisa Amato may return to work 11/9/18. Sincerely, Oumou Grier RN

## 2018-11-08 NOTE — PATIENT INSTRUCTIONS
Fluids/ gargles  Claritin/ allegra   Tylenol cold-sinus - max strength 1-2 tab 4 times/ day    with Advil as needed    Follow throat culture       Viral Infections: Care Instructions  Your Care Instructions    You don't feel well, but it's not clear what's causing it. You may have a viral infection. Viruses cause many illnesses, such as the common cold, influenza, fever, rashes, and the diarrhea, nausea, and vomiting that are often called \"stomach flu. \" You may wonder if antibiotic medicines could make you feel better. But antibiotics only treat infections caused by bacteria. They don't work on viruses. The good news is that viral infections usually aren't serious. Most will go away in a few days without medical treatment. In the meantime, there are a few things you can do to make yourself more comfortable. Follow-up care is a key part of your treatment and safety. Be sure to make and go to all appointments, and call your doctor if you are having problems. It's also a good idea to know your test results and keep a list of the medicines you take. How can you care for yourself at home? · Get plenty of rest if you feel tired. · Take an over-the-counter pain medicine if needed, such as acetaminophen (Tylenol), ibuprofen (Advil, Motrin), or naproxen (Aleve). Read and follow all instructions on the label. · Be careful when taking over-the-counter cold or flu medicines and Tylenol at the same time. Many of these medicines have acetaminophen, which is Tylenol. Read the labels to make sure that you are not taking more than the recommended dose. Too much acetaminophen (Tylenol) can be harmful. · Drink plenty of fluids, enough so that your urine is light yellow or clear like water. If you have kidney, heart, or liver disease and have to limit fluids, talk with your doctor before you increase the amount of fluids you drink. · Stay home from work, school, and other public places while you have a fever.   When should you call for help? Call 911 anytime you think you may need emergency care. For example, call if:    · You have severe trouble breathing.     · You passed out (lost consciousness).    Call your doctor now or seek immediate medical care if:    · You seem to be getting much sicker.     · You have a new or higher fever.     · You have blood in your stools.     · You have new belly pain, or your pain gets worse.     · You have a new rash.    Watch closely for changes in your health, and be sure to contact your doctor if:    · You start to get better and then get worse.     · You do not get better as expected. Where can you learn more? Go to http://natalie-morenita.info/. Enter J947 in the search box to learn more about \"Viral Infections: Care Instructions. \"  Current as of: November 18, 2017  Content Version: 11.8  © 7273-6796 [employer]. Care instructions adapted under license by [employer] (which disclaims liability or warranty for this information). If you have questions about a medical condition or this instruction, always ask your healthcare professional. [address] any warranty or liability for your use of this information.

## 2018-11-08 NOTE — PROGRESS NOTES
Flu   This is a new problem. The current episode started yesterday. The problem occurs constantly. The problem has not changed since onset. Associated symptoms include chest pain. Pertinent negatives include no shortness of breath. Associated symptoms comments: Cough/ congestion/ throat pain- chills. The symptoms are aggravated by sneezing and coughing. Nothing relieves the symptoms. She has tried nothing for the symptoms. Past Medical History:   Diagnosis Date    Preeclampsia 6/3/2017        Past Surgical History:   Procedure Laterality Date    HX OTHER SURGICAL           Family History   Problem Relation Age of Onset    Hypertension Mother     Hypertension Father     Cancer Father         Social History     Socioeconomic History    Marital status:      Spouse name: Not on file    Number of children: Not on file    Years of education: Not on file    Highest education level: Not on file   Social Needs    Financial resource strain: Not on file    Food insecurity - worry: Not on file    Food insecurity - inability: Not on file   [employer] needs - medical: Not on file   [employer] needs - non-medical: Not on file   Occupational History    Not on file   Tobacco Use    Smoking status: Never Smoker    Smokeless tobacco: Never Used   Substance and Sexual Activity    Alcohol use: Yes     Comment: 2-3 glasses per week prior to pregnancy    Drug use: No    Sexual activity: Yes     Partners: Male   Other Topics Concern    Not on file   Social History Narrative    Not on file                ALLERGIES: Pcn [penicillins]    Review of Systems   Constitutional: Positive for chills. HENT: Positive for congestion and sore throat. Respiratory: Positive for cough. Negative for shortness of breath. Cardiovascular: Positive for chest pain. All other systems reviewed and are negative.       Vitals:    11/07/18 1846   BP: 140/84   Pulse: 95   Resp: 18   Temp: 97.6 °F (36.4 °C) SpO2: 98%   Weight: 172 lb (78 kg)   Height: 5' 5\" (1.651 m)       Physical Exam   Constitutional: No distress. HENT:   Right Ear: Tympanic membrane and ear canal normal.   Left Ear: Tympanic membrane and ear canal normal.   Nose: Nose normal.   Mouth/Throat: No oropharyngeal exudate, posterior oropharyngeal edema or posterior oropharyngeal erythema. Eyes: Conjunctivae are normal. Right eye exhibits no discharge. Left eye exhibits no discharge. Neck: Neck supple. Pulmonary/Chest: Effort normal and breath sounds normal. No respiratory distress. She has no wheezes. She has no rales. Lymphadenopathy:     She has no cervical adenopathy. Skin: No rash noted. Nursing note and vitals reviewed. MDM    Procedures        ICD-10-CM ICD-9-CM    1. Flu-like symptoms R68.89 780.99 AMB POC CHRIS INFLUENZA A/B TEST    rapid flu- negative   2. Sore throat J02.9 462 AMB POC RAPID STREP A      UPPER RESPIRATORY CULTURE    RST- negative     No orders of the defined types were placed in this encounter. Results for orders placed or performed in visit on 11/07/18   AMB POC CHRIS INFLUENZA A/B TEST   Result Value Ref Range    VALID INTERNAL CONTROL POC Yes     Influenza A Ag POC Negative Negative Pos/Neg    Influenza B Ag POC Negative Negative Pos/Neg   AMB POC RAPID STREP A   Result Value Ref Range    VALID INTERNAL CONTROL POC Yes     Group A Strep Ag Negative Negative     The patients condition was discussed with the patient and they understand. The patient is to follow up with primary care doctor. If signs and symptoms become worse the pt is to go to the ER. The patient is to take medications as prescribed.

## 2018-11-09 LAB — BACTERIA SPEC RESP CULT: NORMAL

## 2019-07-22 ENCOUNTER — OFFICE VISIT (OUTPATIENT)
Dept: URGENT CARE | Age: 29
End: 2019-07-22

## 2019-07-22 VITALS
HEART RATE: 76 BPM | SYSTOLIC BLOOD PRESSURE: 138 MMHG | HEIGHT: 65 IN | DIASTOLIC BLOOD PRESSURE: 76 MMHG | BODY MASS INDEX: 26.49 KG/M2 | RESPIRATION RATE: 18 BRPM | OXYGEN SATURATION: 99 % | TEMPERATURE: 98.6 F | WEIGHT: 159 LBS

## 2019-07-22 DIAGNOSIS — R42 VERTIGO: ICD-10-CM

## 2019-07-22 DIAGNOSIS — H61.21 IMPACTED CERUMEN OF RIGHT EAR: Primary | ICD-10-CM

## 2019-07-22 NOTE — PROGRESS NOTES
Right ear pressure for 3-4 days  1/10 pain. Feels full. No associated fever or chills  Occasionally feels like room is spinning only present with head movements. Denies syncope/vision change/near syncope or headaches. No other URI symptmos.   hasnt tried meds  Overall not improving  Non smoker             Past Medical History:   Diagnosis Date    Preeclampsia 6/3/2017        Past Surgical History:   Procedure Laterality Date    HX OTHER SURGICAL           Family History   Problem Relation Age of Onset    Hypertension Mother     Hypertension Father     Cancer Father         Social History     Socioeconomic History    Marital status:      Spouse name: Not on file    Number of children: Not on file    Years of education: Not on file    Highest education level: Not on file   Occupational History    Not on file   Social Needs    Financial resource strain: Not on file    Food insecurity:     Worry: Not on file     Inability: Not on file    Transportation needs:     Medical: Not on file     Non-medical: Not on file   Tobacco Use    Smoking status: Never Smoker    Smokeless tobacco: Never Used   Substance and Sexual Activity    Alcohol use: Yes     Comment: 2-3 glasses per week prior to pregnancy    Drug use: No    Sexual activity: Yes     Partners: Male   Lifestyle    Physical activity:     Days per week: Not on file     Minutes per session: Not on file    Stress: Not on file   Relationships    Social connections:     Talks on phone: Not on file     Gets together: Not on file     Attends Latter day service: Not on file     Active member of club or organization: Not on file     Attends meetings of clubs or organizations: Not on file     Relationship status: Not on file    Intimate partner violence:     Fear of current or ex partner: Not on file     Emotionally abused: Not on file     Physically abused: Not on file     Forced sexual activity: Not on file   Other Topics Concern    Not on file Social History Narrative    Not on file                ALLERGIES: Pcn [penicillins]    Review of Systems   Gastrointestinal: Negative for vomiting. All other systems reviewed and are negative. Vitals:    07/22/19 1127   BP: 138/76   Pulse: 76   Resp: 18   Temp: 98.6 °F (37 °C)   SpO2: 99%   Weight: 159 lb (72.1 kg)   Height: 5' 5\" (1.651 m)       Physical Exam   Constitutional: She is oriented to person, place, and time. HENT:   Nose: Nose normal.   Mouth/Throat: Oropharynx is clear and moist. No oropharyngeal exudate. Right cerumen impaction: TM not visible  Post irrigation: TMs normal bilat. Cerumen now absent. External canals bilat clear. Eyes: Pupils are equal, round, and reactive to light. Conjunctivae and EOM are normal.   Neck: Normal range of motion. Neck supple. Cardiovascular: Normal rate, regular rhythm and normal heart sounds. Exam reveals no gallop and no friction rub. No murmur heard. Pulmonary/Chest: Effort normal and breath sounds normal.   Lymphadenopathy:     She has no cervical adenopathy. Neurological: She is alert and oriented to person, place, and time. Psychiatric: She has a normal mood and affect. Her behavior is normal. Thought content normal.       MDM     Differential Diagnosis; Clinical Impression; Plan:       CLINICAL IMPRESSION:  (H61.21) Impacted cerumen of right ear  (primary encounter diagnosis)  (R42) Vertigo    Plan:  Cerumen irrigated successfully  Suspect vertigo to improve over next couple days  Review handouts      We have reviewed concerning signs/symptoms, normal vs abnormal progression of medical condition and when to seek immediate medical attention. Schedule with PCP or Urgent Care immediately for worsening or new symptoms. See your PCP if there is not at least some improvement in symptoms within the next 1 week  You should see your PCP for updates on your routine health maintenance.              Procedures

## 2019-07-22 NOTE — PATIENT INSTRUCTIONS
Follow up with PCP without improvement over next 5-7 days sooner/immediately for new or worsening         Earwax Blockage: Care Instructions  Your Care Instructions    Earwax is a natural substance that protects the ear canal. Normally, earwax drains from the ears and does not cause problems. Sometimes earwax builds up and hardens. Earwax blockage (also called cerumen impaction) can cause some loss of hearing and pain. When wax is tightly packed, you will need to have your doctor remove it. Follow-up care is a key part of your treatment and safety. Be sure to make and go to all appointments, and call your doctor if you are having problems. It's also a good idea to know your test results and keep a list of the medicines you take. How can you care for yourself at home? · Do not try to remove earwax with cotton swabs, fingers, or other objects. This can make the blockage worse and damage the eardrum. · If your doctor recommends that you try to remove earwax at home:  ? Soften and loosen the earwax with warm mineral oil. You also can try hydrogen peroxide mixed with an equal amount of room temperature water. Place 2 drops of the fluid, warmed to body temperature, in the ear two times a day for up to 5 days. ? Once the wax is loose and soft, all that is usually needed to remove it from the ear canal is a gentle, warm shower. Direct the water into the ear, then tip your head to let the earwax drain out. Dry your ear thoroughly with a hair dryer set on low. Hold the dryer several inches from your ear. ? If the warm mineral oil and shower do not work, use an over-the-counter wax softener. Read and follow all instructions on the label. After using the wax softener, use an ear syringe to gently flush the ear. Make sure the flushing solution is body temperature. Cool or hot fluids in the ear can cause dizziness. When should you call for help?   Call your doctor now or seek immediate medical care if:    · Pus or blood drains from your ear.     · Your ears are ringing or feel full.     · You have a loss of hearing.    Watch closely for changes in your health, and be sure to contact your doctor if:    · You have pain or reduced hearing after 1 week of home treatment.     · You have any new symptoms, such as nausea or balance problems. Where can you learn more? Go to http://natalie-morenita.info/. Enter P316 in the search box to learn more about \"Earwax Blockage: Care Instructions. \"  Current as of: September 23, 2018  Content Version: 12.1  © 6306-3943 [employer]. Care instructions adapted under license by [employer] (which disclaims liability or warranty for this information). If you have questions about a medical condition or this instruction, always ask your healthcare professional. [address] any warranty or liability for your use of this information. Vertigo: Care Instructions  Your Care Instructions    Vertigo is the feeling that you or your surroundings are moving when there is no actual movement. It is often described as a feeling of spinning, whirling, falling, or tilting. Vertigo may make you vomit or feel nauseated. You may have trouble standing or walking and may lose your balance. Vertigo is often related to an inner ear problem, but it can have other more serious causes. If vertigo continues, you may need more tests to find its cause. Follow-up care is a key part of your treatment and safety. Be sure to make and go to all appointments, and call your doctor if you are having problems. It's also a good idea to know your test results and keep a list of the medicines you take. How can you care for yourself at home? · Do not lie flat on your back. Prop yourself up slightly. This may reduce the spinning feeling. Keep your eyes open. · Move slowly so that you do not fall. · If your doctor recommends medicine, take it exactly as directed.   · Do not drive while you are having vertigo. Certain exercises, called Rome-Daroff exercises, can help decrease vertigo. To do Rome-Daroff exercises:  · Sit on the edge of a bed or sofa and quickly lie down on the side that causes the worst vertigo. Lie on your side with your ear down. · Stay in this position for at least 30 seconds or until the vertigo goes away. · Sit up. If this causes vertigo, wait for it to stop. · Repeat the procedure on the other side. · Repeat this 10 times. Do these exercises 2 times a day until the vertigo is gone. When should you call for help? Call 911 anytime you think you may need emergency care. For example, call if:    · You passed out (lost consciousness).     · You have symptoms of a stroke. These may include:  ? Sudden numbness, tingling, weakness, or loss of movement in your face, arm, or leg, especially on only one side of your body. ? Sudden vision changes. ? Sudden trouble speaking. ? Sudden confusion or trouble understanding simple statements. ? Sudden problems with walking or balance. ? A sudden, severe headache that is different from past headaches.    Call your doctor now or seek immediate medical care if:    · Vertigo occurs with a fever, a headache, or ringing in your ears.     · You have new or increased nausea and vomiting.    Watch closely for changes in your health, and be sure to contact your doctor if:    · Vertigo gets worse or happens more often.     · Vertigo has not gotten better after 2 weeks. Where can you learn more? Go to http://natalie-morenita.info/. Enter X203 in the search box to learn more about \"Vertigo: Care Instructions. \"  Current as of: October 21, 2018  Content Version: 12.1  © 7666-9788 [employer]. Care instructions adapted under license by [employer] (which disclaims liability or warranty for this information).  If you have questions about a medical condition or this instruction, always ask your healthcare professional. [place] [address] any warranty or liability for your use of this information.

## 2020-01-02 LAB
ANTIBODY SCREEN, EXTERNAL: NEGATIVE
CHLAMYDIA, EXTERNAL: NEGATIVE
HBSAG, EXTERNAL: NEGATIVE
HIV, EXTERNAL: NORMAL
N. GONORRHEA, EXTERNAL: NEGATIVE
RUBELLA, EXTERNAL: NORMAL
T. PALLIDUM, EXTERNAL: NORMAL
TYPE, ABO & RH, EXTERNAL: NORMAL

## 2020-07-01 ENCOUNTER — HOSPITAL ENCOUNTER (OUTPATIENT)
Age: 30
Setting detail: OBSERVATION
Discharge: HOME | End: 2020-07-02
Attending: OBSTETRICS & GYNECOLOGY | Admitting: OBSTETRICS & GYNECOLOGY
Payer: COMMERCIAL

## 2020-07-01 PROBLEM — O26.619 CHOLESTASIS DURING PREGNANCY: Status: ACTIVE | Noted: 2020-07-01

## 2020-07-01 PROBLEM — K83.1 CHOLESTASIS DURING PREGNANCY: Status: ACTIVE | Noted: 2020-07-01

## 2020-07-01 PROBLEM — O13.9 GESTATIONAL HYPERTENSION: Status: ACTIVE | Noted: 2020-07-01

## 2020-07-01 LAB
ALBUMIN SERPL-MCNC: 2.5 G/DL (ref 3.5–5)
ALBUMIN/GLOB SERPL: 0.6 {RATIO} (ref 1.1–2.2)
ALP SERPL-CCNC: 309 U/L (ref 45–117)
ALT SERPL-CCNC: 137 U/L (ref 12–78)
ANION GAP SERPL CALC-SCNC: 10 MMOL/L (ref 5–15)
AST SERPL-CCNC: 62 U/L (ref 15–37)
BILIRUB SERPL-MCNC: 0.6 MG/DL (ref 0.2–1)
BUN SERPL-MCNC: 13 MG/DL (ref 6–20)
BUN/CREAT SERPL: 14 (ref 12–20)
CALCIUM SERPL-MCNC: 9.5 MG/DL (ref 8.5–10.1)
CHLORIDE SERPL-SCNC: 108 MMOL/L (ref 97–108)
CO2 SERPL-SCNC: 18 MMOL/L (ref 21–32)
CREAT SERPL-MCNC: 0.93 MG/DL (ref 0.55–1.02)
CREAT UR-MCNC: 20.5 MG/DL
ERYTHROCYTE [DISTWIDTH] IN BLOOD BY AUTOMATED COUNT: 13.7 % (ref 11.5–14.5)
GLOBULIN SER CALC-MCNC: 4 G/DL (ref 2–4)
GLUCOSE SERPL-MCNC: 92 MG/DL (ref 65–100)
HCT VFR BLD AUTO: 38.9 % (ref 35–47)
HGB BLD-MCNC: 13.1 G/DL (ref 11.5–16)
MCH RBC QN AUTO: 30.1 PG (ref 26–34)
MCHC RBC AUTO-ENTMCNC: 33.7 G/DL (ref 30–36.5)
MCV RBC AUTO: 89.4 FL (ref 80–99)
NRBC # BLD: 0 K/UL (ref 0–0.01)
NRBC BLD-RTO: 0 PER 100 WBC
PLATELET # BLD AUTO: 197 K/UL (ref 150–400)
POTASSIUM SERPL-SCNC: 4 MMOL/L (ref 3.5–5.1)
PROT SERPL-MCNC: 6.5 G/DL (ref 6.4–8.2)
PROT UR-MCNC: <5 MG/DL (ref 0–11.9)
PROT/CREAT UR-RTO: NORMAL
RBC # BLD AUTO: 4.35 M/UL (ref 3.8–5.2)
SODIUM SERPL-SCNC: 136 MMOL/L (ref 136–145)
WBC # BLD AUTO: 9.7 K/UL (ref 3.6–11)

## 2020-07-01 PROCEDURE — 96372 THER/PROPH/DIAG INJ SC/IM: CPT

## 2020-07-01 PROCEDURE — 36415 COLL VENOUS BLD VENIPUNCTURE: CPT

## 2020-07-01 PROCEDURE — 82239 BILE ACIDS TOTAL: CPT

## 2020-07-01 PROCEDURE — 99218 HC RM OBSERVATION: CPT

## 2020-07-01 PROCEDURE — 85027 COMPLETE CBC AUTOMATED: CPT

## 2020-07-01 PROCEDURE — 65410000002 HC RM PRIVATE OB

## 2020-07-01 PROCEDURE — 87081 CULTURE SCREEN ONLY: CPT

## 2020-07-01 PROCEDURE — 74011250636 HC RX REV CODE- 250/636: Performed by: OBSTETRICS & GYNECOLOGY

## 2020-07-01 PROCEDURE — 84156 ASSAY OF PROTEIN URINE: CPT

## 2020-07-01 PROCEDURE — 80053 COMPREHEN METABOLIC PANEL: CPT

## 2020-07-01 PROCEDURE — 74011250637 HC RX REV CODE- 250/637: Performed by: OBSTETRICS & GYNECOLOGY

## 2020-07-01 RX ORDER — ONDANSETRON 4 MG/1
4 TABLET, ORALLY DISINTEGRATING ORAL
Status: DISCONTINUED | OUTPATIENT
Start: 2020-07-01 | End: 2020-07-02 | Stop reason: HOSPADM

## 2020-07-01 RX ORDER — URSODIOL 300 MG/1
300 CAPSULE ORAL 2 TIMES DAILY
COMMUNITY
End: 2020-07-09

## 2020-07-01 RX ORDER — SODIUM CHLORIDE 0.9 % (FLUSH) 0.9 %
5-40 SYRINGE (ML) INJECTION AS NEEDED
Status: DISCONTINUED | OUTPATIENT
Start: 2020-07-01 | End: 2020-07-02 | Stop reason: HOSPADM

## 2020-07-01 RX ORDER — FAMOTIDINE 20 MG/1
20 TABLET, FILM COATED ORAL 2 TIMES DAILY
COMMUNITY
End: 2020-07-09

## 2020-07-01 RX ORDER — SWAB
1 SWAB, NON-MEDICATED MISCELLANEOUS DAILY
Status: DISCONTINUED | OUTPATIENT
Start: 2020-07-02 | End: 2020-07-02 | Stop reason: HOSPADM

## 2020-07-01 RX ORDER — URSODIOL 300 MG/1
300 CAPSULE ORAL 2 TIMES DAILY WITH MEALS
Status: DISCONTINUED | OUTPATIENT
Start: 2020-07-01 | End: 2020-07-02 | Stop reason: HOSPADM

## 2020-07-01 RX ORDER — SODIUM CHLORIDE 0.9 % (FLUSH) 0.9 %
5-40 SYRINGE (ML) INJECTION EVERY 8 HOURS
Status: DISCONTINUED | OUTPATIENT
Start: 2020-07-01 | End: 2020-07-02 | Stop reason: HOSPADM

## 2020-07-01 RX ORDER — URSODIOL 300 MG/1
600 CAPSULE ORAL 2 TIMES DAILY WITH MEALS
Status: DISCONTINUED | OUTPATIENT
Start: 2020-07-01 | End: 2020-07-01

## 2020-07-01 RX ORDER — BETAMETHASONE SODIUM PHOSPHATE AND BETAMETHASONE ACETATE 3; 3 MG/ML; MG/ML
12 INJECTION, SUSPENSION INTRA-ARTICULAR; INTRALESIONAL; INTRAMUSCULAR; SOFT TISSUE EVERY 24 HOURS
Status: COMPLETED | OUTPATIENT
Start: 2020-07-01 | End: 2020-07-02

## 2020-07-01 RX ORDER — DIPHENHYDRAMINE HCL 25 MG
25 CAPSULE ORAL
Status: DISCONTINUED | OUTPATIENT
Start: 2020-07-01 | End: 2020-07-02 | Stop reason: HOSPADM

## 2020-07-01 RX ORDER — ASPIRIN 81 MG/1
TABLET ORAL DAILY
COMMUNITY
End: 2020-07-09

## 2020-07-01 RX ADMIN — Medication 10 ML: at 18:00

## 2020-07-01 RX ADMIN — URSODIOL 300 MG: 300 CAPSULE ORAL at 19:43

## 2020-07-01 RX ADMIN — BETAMETHASONE SODIUM PHOSPHATE AND BETAMETHASONE ACETATE 12 MG: 3; 3 INJECTION, SUSPENSION INTRA-ARTICULAR; INTRALESIONAL; INTRAMUSCULAR at 16:22

## 2020-07-01 NOTE — PROGRESS NOTES
7/1/2020  1:39 PM - patient here from home after going in to Doc appt for elevated Bps. History of pre-eclampsia and HELP with first baby. Here for monitoring, labs, and MFM consult    1410 - Dr Yuriy Mcbride here to see patient. Discussed plan to monitor BP and run labs. Patient mentioned that baby has been breech at scan two weeks ago. Dr Yuriy Mcbride plans to have MFM consult today to determine what the plan of care should be and verbal order received to administer betamethasone today. 1500 - Dr Yuriy Mcbride spoke to Dr Collette Fish with MFM over phone. Plan to move to delivery based on patient labs and status - no beta necessary - per MFM. 1520 - Dr Yuriy Mcbride at bedside. Scanned patient, baby is breech. Discussed plan to move to C Section or potentially try a cephalic version but discussed risks of version. Patient and  verbalized understanding of options. Patient agreeable to C Section if delivery is needed today    [phone] - Dr Yuriy Mcbride reviewed labs with MFM and plan to hold on C Section for now. Administer first dose of Beta today and plan to administer 2nd dose tomorrow and repeat labs. Plan of care decisions to be made after labs tomorrow to determine if patient needs to be delivered. 200 - Dr Yuriy Mcbride at bedside. Patient stable and okay to go to Antepartum. GBS swab done. 1825 - TRANSFER - OUT REPORT:    Verbal report given to BIRGIT Hernandez RN (name) on Danny David  being transferred to [facility] (unit) for routine progression of care       Report consisted of patients Situation, Background, Assessment and   Recommendations(SBAR). Information from the following report(s) SBAR was reviewed with the receiving nurse. Lines:   Peripheral IV 07/01/20 Left;Posterior Hand (Active)        Opportunity for questions and clarification was provided.       Patient transported with:   Registered Nurse

## 2020-07-01 NOTE — PROGRESS NOTES
Requested by Dr. Eric Lacey to evaluate patient with abnormal labs. This consultation was done remotely and by chart review only. I have spoken with the admitting attending as well. Pt is a 31year old  with prior hx of ICP and PET at 34 weeks, that required  delivery. This pregnancy is complicated by GHTN and ICP again, with recent increase in bile acids, and abnormal LFTs. She rpts ha but has not used any medication for relief due to elevated LFTs, per Dr. Garcia Ceres instruction. She is experiencing pruritis. She is on ASA for PET prophylaxis and UDCA for ICP. Medical hx is significant for depression and anxiety, not currently on medication. Pts bp on arrival 154/97; 157/98. Labs are as follows: Hb 13.1  (Hb at 28 week labs was 11. 4) Plt 197, AST/ALT  on  78 and 162, and today  62/137. There has been a trend of decreasing LFTs over the prior 3 days. Bile acids over this same period of time have increased from 30->56.8. Serum creatinine today is 0.93. Baseline creatinine earlier in gestation was 0.8. On  creatinine was 1.2. Protein: creatinine ratio could not be calculated today. Serum glucose 92.      146/89     20 1608   146/95Abnormal    20 1555  91 134/83   20 1545  90 140/93Abnormal    20 1535  86 139/88   20 1525  97 156/97Abnormal    20 1515  84 137/94Abnormal    20 1505  85 145/98Abnormal    20 1455  85 145/100Abnormal    20 1445  76 146/95Abnormal    20 1435  79 147/102Abnormal    20 1425  85 147/97Abnormal    20 1405  94 157/98Abnormal    20 1356      20 1344 97.8 °F (36.6 °C) 94 154/97Abnormal          NST is reactive with excellent short and long term variability and accelerations noted. Before lab results from today were available, I was very concerned about renal insufficiency pointing toward preE with severe features, and had recommended delivery.  Since return of labs from today, the continued decreasing trend in LFTs (with ALT remaining more elevated than AST), the stable platelet count, serum creatinine that is overall elevated but not sufficient in and of itself to to qualify as severe features, and stable hemoglobin do not point toward immediate need for delivery. I remain concerned, and would not discharge from hospital at this point because her blood pressures are certainly elevated, and the degree of hemoconcentration that is evidenced by Hb increased from 28 week labs to today, means that if she were intravascularly replete, her BPs may be even higher. I am also concerned about her lingering headache and would offer APAP given the trending LFTs. The ALT being more elevated than AST is more typical of cholestasis than preE, and I suspect the LFT trend will continue to decrease. The bile acids elevating suggest that UDCA should be continued. I concur with DR. Shanks's plan to continue to monitor both fetus and mother with EFM and serial Bps as well as with daily labs and scan for growth and fluid tomorrow. Administration of  corticosteroids can \"improve\" platelet count, and decrease LFTs artificially, and lab values moving forward may not be reliable in the 24-48 hr time period surrounding BMZ admin; other lab values are less likely to be affected by steroids. Mg is not indicated at this time unless headache becomes persistent despite meds. Continued close obs is warranted.      Luís Carr MD  Perinatology

## 2020-07-01 NOTE — H&P
History & Physical    Name: Brittaney Fletcher MRN: [id number]  SSN: [id number]    YOB: 1990  Age: 31 y.o. Sex: female        Subjective:     Estimated Date of Delivery: 20  OB History    Para Term  AB Living   2 1   1   1   SAB TAB Ectopic Molar Multiple Live Births           0 1      # Outcome Date GA Lbr Leroy/2nd Weight Sex Delivery Anes PTL Lv   2 Current            1  06/10/17 34w4d 04:30 / 00:49 2.242 kg F Vag-Spont EPIDURAL AN N KAREN      Complications: Cholestasis, Preeclampsia, third trimester       Ms. Black Arciniega is admitted with pregnancy at 34w3d for history of cholestasis and gestational hypertension Pt was called to come to L&D after records were reviewed from office visit on 20. Pt had presented to the office with mildly elevated blood pressures. Labs drawn revealed increased bile acids, LFTs treading down, and creatinine worsening from 0.8 to 1.2. Pt complains of mild headache for days and lower extremity edema. She denies visual changes, nausea, vomiting, SOB, chest pain, RUQ pain. Pt has hx of delivery at 34 weeks for cholestasis and pre-eclampsia/HELLP. Please see prenatal records for details.     Past Medical History:   Diagnosis Date    Gestational hypertension     Preeclampsia 6/3/2017    Psychiatric problem     Depression and Anxiety - unmedicated     Past Surgical History:   Procedure Laterality Date    HX OTHER SURGICAL  2007    [place] teeth extraction     Social History     Occupational History    Not on file   Tobacco Use    Smoking status: Never Smoker    Smokeless tobacco: Never Used   Substance and Sexual Activity    Alcohol use: Yes     Comment: 2-3 glasses per week prior to pregnancy    Drug use: No    Sexual activity: Yes     Partners: Male     Family History   Problem Relation Age of Onset    Hypertension Mother     Hypertension Father     Cancer Father        Allergies   Allergen Reactions    Pcn [Penicillins] Unknown (comments)     Prior to Admission medications    Medication Sig Start Date End Date Taking? Authorizing Provider   PNV No12-Iron-FA-DSS-OM-3 29 mg iron-1 mg -50 mg CPKD Take  by mouth. Yes Provider, Historical   ursodioL (ACTIGALL) 300 mg capsule Take 300 mg by mouth two (2) times a day. Yes Provider, Historical   famotidine (Pepcid) 20 mg tablet Take 20 mg by mouth two (2) times a day. Yes Provider, Historical   aspirin delayed-release 81 mg tablet Take  by mouth daily. Yes Provider, Historical        Review of Systems: A comprehensive review of systems was negative except for that written in the HPI. Objective:     Vitals:  Vitals:    20 1405 20 1425 20 1435 20 1445   BP: (!) 157/98 (!) 147/97 (!) 147/102 (!) 146/95   Pulse: 94 85 79 76   Resp:       Temp:       Weight:       Height:            Physical Exam:  Patient without distress. Heart: Regular rate and rhythm or S1S2 present  Lung: clear to auscultation throughout lung fields, no wheezes, no rales, no rhonchi and normal respiratory effort  Abdomen: soft, nontender  Fundus: soft and non tender  Lower Extremities:  - Edema 1+  Membranes:  Intact  Fetal Heart Rate: Reactive  Baseline: 140 per minute  Variability: moderate  Accelerations: yes  Decelerations: none  Uterine contractions: none    Prenatal Labs:   Lab Results   Component Value Date/Time    ABO/Rh(D) O POSITIVE 2017 08:47 PM    Rubella, External immune 2016    GrBStrep, External negative 2017    HBsAg, External negative 2016    HIV, External negative 2016    RPR, External non reactive 2016    ABO,Rh o positive 2016         Assessment/Plan:     Active Problems:    * No active hospital problems. *       Plan:  30 yo  at 34w3d admitted with cholestasis and gestational hypertension, now with worsening renal function.   Discussed plan of care with CRISTOBAL Santos, and delivery initially recommended due to renal insufficiency. On review of labs today, Cr improved to 0.93 and LFTs stable, BPs mild range, Hgb 13.1, plts 197, unable to calculate urine P/Cr ratio. Repeat bile acids pending. Will now plan to give betamethasone for fetal lung maturity, and continue to monitor closely. 24 hr urine pending. Will repeat labs in the morning and pt to have MFM consult. Low threshold to move forward with delivery for worsening labs or symptoms. Will get neonatology consult. Group B Strep was unknown. Fetus breech. Discussed possible ECV v. primary  delivery when delivery indicated. Pt and  considering options and have been counseled on risks/benefits.

## 2020-07-02 VITALS
HEIGHT: 65 IN | OXYGEN SATURATION: 97 % | HEART RATE: 98 BPM | BODY MASS INDEX: 34.16 KG/M2 | WEIGHT: 205 LBS | RESPIRATION RATE: 16 BRPM | DIASTOLIC BLOOD PRESSURE: 93 MMHG | SYSTOLIC BLOOD PRESSURE: 128 MMHG | TEMPERATURE: 97.9 F

## 2020-07-02 LAB
ALBUMIN SERPL-MCNC: 2.3 G/DL (ref 3.5–5)
ALBUMIN/GLOB SERPL: 0.5 {RATIO} (ref 1.1–2.2)
ALP SERPL-CCNC: 296 U/L (ref 45–117)
ALT SERPL-CCNC: 140 U/L (ref 12–78)
ANION GAP SERPL CALC-SCNC: 10 MMOL/L (ref 5–15)
AST SERPL-CCNC: 81 U/L (ref 15–37)
BILE AC SERPL-SCNC: 35.6 UMOL/L (ref 0–10)
BILIRUB SERPL-MCNC: 0.7 MG/DL (ref 0.2–1)
BUN SERPL-MCNC: 14 MG/DL (ref 6–20)
BUN/CREAT SERPL: 13 (ref 12–20)
CALCIUM SERPL-MCNC: 9.4 MG/DL (ref 8.5–10.1)
CHLORIDE SERPL-SCNC: 108 MMOL/L (ref 97–108)
CO2 SERPL-SCNC: 17 MMOL/L (ref 21–32)
COLLECT DURATION TIME UR: 24 HR
CREAT SERPL-MCNC: 1.04 MG/DL (ref 0.55–1.02)
ERYTHROCYTE [DISTWIDTH] IN BLOOD BY AUTOMATED COUNT: 13.8 % (ref 11.5–14.5)
GLOBULIN SER CALC-MCNC: 4.4 G/DL (ref 2–4)
GLUCOSE SERPL-MCNC: 139 MG/DL (ref 65–100)
HCT VFR BLD AUTO: 36.7 % (ref 35–47)
HGB BLD-MCNC: 12.4 G/DL (ref 11.5–16)
MCH RBC QN AUTO: 30.3 PG (ref 26–34)
MCHC RBC AUTO-ENTMCNC: 33.8 G/DL (ref 30–36.5)
MCV RBC AUTO: 89.7 FL (ref 80–99)
NRBC # BLD: 0.02 K/UL (ref 0–0.01)
NRBC BLD-RTO: 0.2 PER 100 WBC
PLATELET # BLD AUTO: 199 K/UL (ref 150–400)
POTASSIUM SERPL-SCNC: 4.1 MMOL/L (ref 3.5–5.1)
PROT 24H UR-MRATE: 246 MG/24HR
PROT SERPL-MCNC: 6.7 G/DL (ref 6.4–8.2)
RBC # BLD AUTO: 4.09 M/UL (ref 3.8–5.2)
SARS-COV-2, COV2: NOT DETECTED
SODIUM SERPL-SCNC: 135 MMOL/L (ref 136–145)
SOURCE, COVRS: NORMAL
SPECIMEN SOURCE, FCOV2M: NORMAL
SPECIMEN VOL ?TM UR: 4100 ML
WBC # BLD AUTO: 11.8 K/UL (ref 3.6–11)

## 2020-07-02 PROCEDURE — 96372 THER/PROPH/DIAG INJ SC/IM: CPT

## 2020-07-02 PROCEDURE — 80053 COMPREHEN METABOLIC PANEL: CPT

## 2020-07-02 PROCEDURE — 74011250636 HC RX REV CODE- 250/636: Performed by: OBSTETRICS & GYNECOLOGY

## 2020-07-02 PROCEDURE — 36415 COLL VENOUS BLD VENIPUNCTURE: CPT

## 2020-07-02 PROCEDURE — 85027 COMPLETE CBC AUTOMATED: CPT

## 2020-07-02 PROCEDURE — 87635 SARS-COV-2 COVID-19 AMP PRB: CPT

## 2020-07-02 PROCEDURE — 74011250637 HC RX REV CODE- 250/637: Performed by: OBSTETRICS & GYNECOLOGY

## 2020-07-02 PROCEDURE — 99218 HC RM OBSERVATION: CPT

## 2020-07-02 RX ADMIN — Medication 10 ML: at 05:02

## 2020-07-02 RX ADMIN — BETAMETHASONE SODIUM PHOSPHATE AND BETAMETHASONE ACETATE 12 MG: 3; 3 INJECTION, SUSPENSION INTRA-ARTICULAR; INTRALESIONAL; INTRAMUSCULAR at 16:12

## 2020-07-02 RX ADMIN — URSODIOL 300 MG: 300 CAPSULE ORAL at 08:58

## 2020-07-02 RX ADMIN — Medication 1 TABLET: at 08:58

## 2020-07-02 NOTE — ROUTINE PROCESS
Bedside shift change report given to Debra Zavala RN (oncoming nurse) by Kathy Grider RN (offgoing nurse). Report included the following information SBAR, Procedure Summary, Intake/Output, Recent Results and Med Rec Status.

## 2020-07-02 NOTE — PROGRESS NOTES
Problem:  Delivery: Day of Delivery  Goal: Off Pathway (Use only if patient is Off Pathway)  Outcome: Progressing Towards Goal  Goal: Activity/Safety  Outcome: Progressing Towards Goal  Goal: Consults, if ordered  Outcome: Progressing Towards Goal  Goal: Diagnostic Test/Procedures  Outcome: Progressing Towards Goal  Goal: Nutrition/Diet  Outcome: Progressing Towards Goal  Goal: Discharge Planning  Outcome: Progressing Towards Goal  Goal: Medications  Outcome: Progressing Towards Goal  Goal: Respiratory  Outcome: Progressing Towards Goal  Goal: Treatments/Interventions/Procedures  Outcome: Progressing Towards Goal  Goal: Psychosocial  Outcome: Progressing Towards Goal  Goal: *Vital signs within defined limits  Outcome: Progressing Towards Goal  Goal: *Labs within defined limits  Outcome: Progressing Towards Goal  Goal: *Hemodynamically stable  Outcome: Progressing Towards Goal  Goal: *Optimal pain control at patient's stated goal  Outcome: Progressing Towards Goal  Goal: *Participates in infant care  Outcome: Progressing Towards Goal  Goal: *Demonstrates progressive activity  Outcome: Progressing Towards Goal  Goal: *Tolerating diet  Outcome: Progressing Towards Goal     Problem:  Delivery: Postpartum Day 1  Goal: Off Pathway (Use only if patient is Off Pathway)  Outcome: Progressing Towards Goal  Goal: Activity/Safety  Outcome: Progressing Towards Goal  Goal: Consults, if ordered  Outcome: Progressing Towards Goal  Goal: Diagnostic Test/Procedures  Outcome: Progressing Towards Goal  Goal: Nutrition/Diet  Outcome: Progressing Towards Goal  Goal: Discharge Planning  Outcome: Progressing Towards Goal  Goal: Medications  Outcome: Progressing Towards Goal  Goal: Respiratory  Outcome: Progressing Towards Goal  Goal: Treatments/Interventions/Procedures  Outcome: Progressing Towards Goal  Goal: Psychosocial  Outcome: Progressing Towards Goal  Goal: *Vital signs within defined limits  Outcome: Progressing Towards Goal  Goal: *Labs within defined limits  Outcome: Progressing Towards Goal  Goal: *Hemodynamically stable  Outcome: Progressing Towards Goal  Goal: *Optimal pain control at patient's stated goal  Outcome: Progressing Towards Goal  Goal: *Participates in infant care  Outcome: Progressing Towards Goal  Goal: *Demonstrates progressive activity  Outcome: Progressing Towards Goal  Goal: *Tolerating diet  Outcome: Progressing Towards Goal  Goal: *Performs self perineal care  Outcome: Progressing Towards Goal     Problem:  Delivery: Postpartum Day 2  Goal: Off Pathway (Use only if patient is Off Pathway)  Outcome: Progressing Towards Goal  Goal: Activity/Safety  Outcome: Progressing Towards Goal  Goal: Consults, if ordered  Outcome: Progressing Towards Goal  Goal: Nutrition/Diet  Outcome: Progressing Towards Goal  Goal: Discharge Planning  Outcome: Progressing Towards Goal  Goal: Medications  Outcome: Progressing Towards Goal  Goal: Treatments/Interventions/Procedures  Outcome: Progressing Towards Goal  Goal: Psychosocial  Outcome: Progressing Towards Goal  Goal: *Vital signs within defined limits  Outcome: Progressing Towards Goal  Goal: *Labs within defined limits  Outcome: Progressing Towards Goal  Goal: *Hemodynamically stable  Outcome: Progressing Towards Goal  Goal: *Optimal pain control at patient's stated goal  Outcome: Progressing Towards Goal  Goal: *Participates in infant care  Outcome: Progressing Towards Goal  Goal: *Demonstrates progressive activity  Outcome: Progressing Towards Goal  Goal: *Appropriate parent-infant bonding  Outcome: Progressing Towards Goal  Goal: *Tolerating diet  Outcome: Progressing Towards Goal     Problem:  Delivery: Postpartum Day 3  Goal: Off Pathway (Use only if patient is Off Pathway)  Outcome: Progressing Towards Goal  Goal: Activity/Safety  Outcome: Progressing Towards Goal  Goal: Consults, if ordered  Outcome: Progressing Towards Goal  Goal: Nutrition/Diet  Outcome: Progressing Towards Goal  Goal: Discharge Planning  Outcome: Progressing Towards Goal  Goal: Medications  Outcome: Progressing Towards Goal  Goal: Treatments/Interventions/Procedures  Outcome: Progressing Towards Goal  Goal: Psychosocial  Outcome: Progressing Towards Goal     Problem:  Delivery: Discharge Outcomes  Goal: *Follow-up appointments as indicated  Outcome: Progressing Towards Goal  Goal: *Describes available resources and support systems  Outcome: Progressing Towards Goal  Goal: *No signs and symptoms of infection  Outcome: Progressing Towards Goal  Goal: *Birth certificate information completed  Outcome: Progressing Towards Goal  Goal: *Received and verbalizes understanding of discharge plan and instructions  Outcome: Progressing Towards Goal  Goal: *Vital signs within defined limits  Outcome: Progressing Towards Goal  Goal: *Labs within defined limits  Outcome: Progressing Towards Goal  Goal: *Hemodynamically stable  Outcome: Progressing Towards Goal  Goal: *Optimal pain control at patient's stated goal  Outcome: Progressing Towards Goal  Goal: *Participates in infant care  Outcome: Progressing Towards Goal  Goal: *Demonstrates progressive activity  Outcome: Progressing Towards Goal  Goal: *Appropriate parent-infant bonding  Outcome: Progressing Towards Goal  Goal: *Tolerating diet  Outcome: Progressing Towards Goal  Goal: *Verbalizes name, dosage, time, side effects, and number of days to continue medications  Outcome: Progressing Towards Goal  Goal: *Influenza vaccine administered (October-March)  Outcome: Progressing Towards Goal     Problem: Hypertensive Disorders of Pregnancy Care Plan (Gestational HTN, Preeclampsia, HELLP syndrome, Eclampsia, Chronic HTN, Superimposed Preeclamsia)  Goal: *Blood pressure within specified parameters  Outcome: Progressing Towards Goal  Goal: *Fluid volume balance  Outcome: Progressing Towards Goal  Goal: *Labs within defined limits  Outcome: Progressing Towards Goal  Goal: *Reassuring fetal surveillance  Outcome: Progressing Towards Goal  Goal: *Remains free of seizures  Outcome: Progressing Towards Goal     Problem: Patient Education: Go to Patient Education Activity  Goal: Patient/Family Education  Outcome: Progressing Towards Goal

## 2020-07-02 NOTE — DISCHARGE INSTRUCTIONS
Patient Education        Cholestasis of Pregnancy: Care Instructions  Your Care Instructions     Cholestasis of pregnancy is a liver problem. It makes your skin very itchy. It happens when bile doesn't flow out of the liver very well. This problem doesn't cause any serious health problems for a pregnant woman. But it may cause problems for your baby. Your doctor will want to watch you and your baby closely. To keep you both as healthy as possible, your doctor may recommend an early delivery. Sometimes doctors also recommend medicine. Medicine can reduce bile acids. After a baby is born, this problem goes away. Follow-up care is a key part of your treatment and safety. Be sure to make and go to all appointments, and call your doctor if you are having problems. It's also a good idea to know your test results and keep a list of the medicines you take. How can you care for yourself at home? · Be safe with medicines. Take your medicines exactly as prescribed. Call your doctor if you think you are having a problem with your medicine. · If your doctor prescribes them, use creams or pills to help with itching. · Use calamine lotion on itchy areas. · Do not take hot showers or baths. Hot water can make itching worse. When should you call for help? Call your doctor now or seek immediate medical care if:  · Your itching gets worse or you get other symptoms. · You think that you are in labor. · There is a new or increasing yellow color to your skin or the whites of your eyes. Watch closely for changes in your health, and be sure to contact your doctor if you have any questions or concerns. Where can you learn more? Go to http://natalie-morenita.info/  Enter [id number] in the search box to learn more about \"Cholestasis of Pregnancy: Care Instructions. \"  Current as of: February 11, 2020               Content Version: 12.5  © 7554-6516 Healthwise, Incorporated.    Care instructions adapted under license by [employer] (which disclaims liability or warranty for this information). If you have questions about a medical condition or this instruction, always ask your healthcare professional. [place] [address] any warranty or liability for your use of this information. Patient Education        Weeks 34 to 39 of Your Pregnancy: Care Instructions  Your Care Instructions    By now, your baby and your belly have grown quite large. It is almost time to give birth. Your baby's lungs are almost ready to breathe air. The bones in your baby's head are now firm enough to protect it, but soft enough to move down through the birth canal.  You may feel excited, happy, anxious, or scared. You may wonder how you will know if you are in labor or what to expect during labor. Try to be flexible in your expectations of the birth. Because each birth is different, there is no way to know exactly what childbirth will be like for you. This care sheet will help you know what to expect and how to prepare. This may make your childbirth easier. If you haven't already had the Tdap shot during this pregnancy, talk to your doctor about getting it. It will help protect your  against pertussis infection. In the 36th week, most women have a test for group B streptococcus (GBS). GBS is a common bacteria that can live in the vagina and rectum. It can make your baby sick after birth. If you test positive, you will get antibiotics during labor. The medicine will keep your baby from getting the bacteria. Follow-up care is a key part of your treatment and safety. Be sure to make and go to all appointments, and call your doctor if you are having problems. It's also a good idea to know your test results and keep a list of the medicines you take. How can you care for yourself at home? Learn about pain relief choices  · Pain is different for every woman. Talk with your doctor about your feelings about pain.   · You can choose from several types of pain relief. These include medicine or breathing techniques, as well as comfort measures. You can use more than one option. · If you choose to have pain medicine during labor, talk to your doctor about your options. Some medicines lower anxiety and help with some of the pain. Others make your lower body numb so that you won't feel pain. · Be sure to tell your doctor about your pain medicine choice before you start labor or very early in your labor. You may be able to change your mind as labor progresses. · Rarely, a woman is put to sleep by medicine given through a mask or an IV. Labor and delivery  · The first stage of labor has three parts: early, active, and transition. ? Most women have early labor at home. You can stay busy or rest, eat light snacks, drink clear fluids, and start counting contractions. ? When talking during a contraction gets hard, you may be moving to active labor. During active labor, you should head for the hospital if you are not there already. ? You are in active labor when contractions come every 3 to 4 minutes and last about 60 seconds. Your cervix is opening more rapidly. ? If your water breaks, contractions will come faster and stronger. ? During transition, your cervix is stretching, and contractions are coming more rapidly. ? You may want to push, but your cervix might not be ready. Your doctor will tell you when to push. · The second stage starts when your cervix is completely opened and you are ready to push. ? Contractions are very strong to push the baby down the birth canal.  ? You will feel the urge to push. You may feel like you need to have a bowel movement. ? You may be coached to push with contractions. These contractions will be very strong, but you will not have them as often. You can get a little rest between contractions. ? You may be emotional and irritable. You may not be aware of what is going on around you.  ?  One last push, and your baby is born. · The third stage is when a few more contractions push out the placenta. This may take 30 minutes or less. · The fourth stage is the welcome recovery. You may feel overwhelmed with emotions and exhausted but alert. This is a good time to start breastfeeding. Where can you learn more? Go to http://natalie-morenita.info/  Enter B912 in the search box to learn more about \"Weeks 34 to 36 of Your Pregnancy: Care Instructions. \"  Current as of: May 29, 2019Content Version: 12.4  © 9205-5659 Healthwise, Incorporated. Care instructions adapted under license by [employer] (which disclaims liability or warranty for this information). If you have questions about a medical condition or this instruction, always ask your healthcare professional. [address] any warranty or liability for your use of this information.

## 2020-07-02 NOTE — CONSULTS
Neonatology Antepartum Consultation    Name: Helga Naylor   Medical Record Number: [id number]   YOB: 1990  Today's Date: 2020                                                                 Date of Consultation:  2020  Time: 8:33 PM  NICU Provider: WILLIAM Johnson  Referring Physician: Chantal Kamara.   [address], MD  Admission Diagnoses: Gestational hypertension [O13.9]  Cholestasis during pregnancy [O26.619, K83.1]  Reason for Consultation: Possible delivery at 34 weeks        Objective:     Age: 31 y.o.  Dunbar Rosenbaum  Para:   Gestation age: 26w3d    Rupture of Membrane: Membranes  Membrane Status: Intact      Maternal steroids:  yes     Medications:   Current Facility-Administered Medications   Medication Dose Route Frequency    betamethasone (CELESTONE) injection 12 mg  12 mg IntraMUSCular Q24H    sodium chloride (NS) flush 5-40 mL  5-40 mL IntraVENous Q8H    sodium chloride (NS) flush 5-40 mL  5-40 mL IntraVENous PRN    ondansetron (ZOFRAN ODT) tablet 4 mg  4 mg Oral Q6H PRN    diphenhydrAMINE (BENADRYL) capsule 25 mg  25 mg Oral Q4H PRN    [START ON 2020] prenatal vit-iron fumarate-fa (PRENATAL PLUS with IRON) tablet 1 Tab  1 Tab Oral DAILY    ursodioL (ACTIGALL) capsule 300 mg  300 mg Oral BID WITH MEALS         Data Review:   Prenatal Labs:     Lab Results   Component Value Date/Time    ABO/Rh(D) O POSITIVE 2017 08:47 PM    ABO,Rh O Positive 2020    Rubella, External Immune 2020    GrBStrep, External negative 2017    HBsAg, External Negative 2020    HIV, External Non-reactive 2020    RPR, External non reactive 2016    Gonorrhea, External Negative 2020    Chlamydia, External Negative 2020         Last Ultrasound:  Last Estimated Fetal Weight:      Assessment:     Active Problems:    Gestational hypertension (2020)      Cholestasis during pregnancy (2020)         Concerns/Plan:       Summary: Rula Beard Bel[place] Yan is 31 y.o., , at 34w3d gestation. Fetal monitoring stable per H&P. Maternal labs unremarkable, GBS pending. Maternal hx significant for cholestasis during pregnancy, otherwise uncomplicated pregnancy. She and FOB are present for consult. They are very pleasant and ask appropriate questions which were answered / acknowledged. Discussed the risks and concerns associated w/ late  infants. MOB has a history of  infant and knowledge of the NICU. Mother intends to provide breast milk and benefits of such explained but is agreeable with using formula as a bridge. Discussed  visitation. Thank you for the opportunity to consult them and please don't hesitate to call with questions / concerns (NICU ext [phone]). 20 minutes spent on all aspects of care including face to face discussion, reviewing labs, H&P, maternal hx, relevant literature, and creating note. Of this time,  > 50% was spent face to face with patient and on floor.       Signed: WILLIAM Salas-BC  Date/Time: 2020 at 8:33 PM

## 2020-07-02 NOTE — PROGRESS NOTES
AntePartum Progress Note    Anthony Music  05A2H    Patient admitted for pregnancy induced hypertension and cholestasis of pregnancy 34w4d Estimated Date of Delivery: 20 states she does not  have  headache , abdominal pain  , contractions and right upper quadrant pain  . Pt had 1st dose of BMTZ yesterday at 4pm. 24hr urine protein collection was started yesterday. Remote consult with MFM completed yesterday and plan is for in person ultrasound and consult today. Pt resting comfortably upon my arrival this morning. Vitals:    Patient Vitals for the past 24 hrs:   BP Temp Pulse Resp SpO2 Height Weight   20 0809 114/72 97.9 °F (36.6 °C) (!) 102 16      20 0454 (!) 129/93 97.7 °F (36.5 °C) 84 16      20 0051 133/85  86       20 2244 120/71         20 2103 (!) 130/92 98.1 °F (36.7 °C) 82 16 97 %     20 1900 160/63 98.3 °F (36.8 °C) 79 16 96 %     20 1810 (!) 145/94 98.3 °F (36.8 °C) 75 16      20 1739 (!) 145/106  85       20 1709 (!) 142/93  87       20 1639 146/89  75       20 1608 (!) 146/95         20 1555 134/83  91       20 1545 (!) 140/93  90       20 1535 139/88  86       20 1525 (!) 156/97  97       20 1515 (!) 137/94  84       20 1505 (!) 145/98  85       20 1455 (!) 145/100  85       20 1445 (!) 146/95  76       20 1435 (!) 147/102  79       20 1425 (!) 147/97  85       20 1405 (!) 157/98  94       20 1356      5' 5\" (1.651 m) 93 kg (205 lb)   20 1344 (!) 154/97 97.8 °F (36.6 °C) 94 16        Temp (24hrs), Av °F (36.7 °C), Min:97.7 °F (36.5 °C), Max:98.3 °F (36.8 °C)    I&O:   No intake/output data recorded.               1901 -  0700  In: -   Out: 2600 [Urine:2600]  NST:  Just placed on monitor this morning  Uterine Activity: None    Exam:  Patient without distress. Abdomen soft, non-tender               Fundus soft and non tender               Lower extremities edema 1+                                           Labs:   Recent Results (from the past 24 hour(s))   METABOLIC PANEL, COMPREHENSIVE    Collection Time: 07/01/20  2:05 PM   Result Value Ref Range    Sodium 136 136 - 145 mmol/L    Potassium 4.0 3.5 - 5.1 mmol/L    Chloride 108 97 - 108 mmol/L    CO2 18 (L) 21 - 32 mmol/L    Anion gap 10 5 - 15 mmol/L    Glucose 92 65 - 100 mg/dL    BUN 13 6 - 20 MG/DL    Creatinine 0.93 0.55 - 1.02 MG/DL    BUN/Creatinine ratio 14 12 - 20      GFR est AA >60 >60 ml/min/1.73m2    GFR est non-AA >60 >60 ml/min/1.73m2    Calcium 9.5 8.5 - 10.1 MG/DL    Bilirubin, total 0.6 0.2 - 1.0 MG/DL    ALT (SGPT) 137 (H) 12 - 78 U/L    AST (SGOT) 62 (H) 15 - 37 U/L    Alk. phosphatase 309 (H) 45 - 117 U/L    Protein, total 6.5 6.4 - 8.2 g/dL    Albumin 2.5 (L) 3.5 - 5.0 g/dL    Globulin 4.0 2.0 - 4.0 g/dL    A-G Ratio 0.6 (L) 1.1 - 2.2     CBC W/O DIFF    Collection Time: 07/01/20  2:05 PM   Result Value Ref Range    WBC 9.7 3.6 - 11.0 K/uL    RBC 4.35 3.80 - 5.20 M/uL    HGB 13.1 11.5 - 16.0 g/dL    HCT 38.9 35.0 - 47.0 %    MCV 89.4 80.0 - 99.0 FL    MCH 30.1 26.0 - 34.0 PG    MCHC 33.7 30.0 - 36.5 g/dL    RDW 13.7 11.5 - 14.5 %    PLATELET 785 748 - 495 K/uL    NRBC 0.0 0  WBC    ABSOLUTE NRBC 0.00 0.00 - 0.01 K/uL   PROTEIN/CREATININE RATIO, URINE    Collection Time: 07/01/20  2:07 PM   Result Value Ref Range    Protein, urine random <5 0.0 - 11.9 mg/dL    Creatinine, urine 20.50 mg/dL    Protein/Creat.  urine Ratio Cannot be calculated     METABOLIC PANEL, COMPREHENSIVE    Collection Time: 07/02/20 12:53 AM   Result Value Ref Range    Sodium 135 (L) 136 - 145 mmol/L    Potassium 4.1 3.5 - 5.1 mmol/L    Chloride 108 97 - 108 mmol/L    CO2 17 (L) 21 - 32 mmol/L    Anion gap 10 5 - 15 mmol/L    Glucose 139 (H) 65 - 100 mg/dL    BUN 14 6 - 20 MG/DL    Creatinine 1.04 (H) 0.55 - 1.02 MG/DL    BUN/Creatinine ratio 13 12 - 20      GFR est AA >60 >60 ml/min/1.73m2    GFR est non-AA >60 >60 ml/min/1.73m2    Calcium 9.4 8.5 - 10.1 MG/DL    Bilirubin, total 0.7 0.2 - 1.0 MG/DL    ALT (SGPT) 140 (H) 12 - 78 U/L    AST (SGOT) 81 (H) 15 - 37 U/L    Alk. phosphatase 296 (H) 45 - 117 U/L    Protein, total 6.7 6.4 - 8.2 g/dL    Albumin 2.3 (L) 3.5 - 5.0 g/dL    Globulin 4.4 (H) 2.0 - 4.0 g/dL    A-G Ratio 0.5 (L) 1.1 - 2.2     CBC W/O DIFF    Collection Time: 20 12:53 AM   Result Value Ref Range    WBC 11.8 (H) 3.6 - 11.0 K/uL    RBC 4.09 3.80 - 5.20 M/uL    HGB 12.4 11.5 - 16.0 g/dL    HCT 36.7 35.0 - 47.0 %    MCV 89.7 80.0 - 99.0 FL    MCH 30.3 26.0 - 34.0 PG    MCHC 33.8 30.0 - 36.5 g/dL    RDW 13.8 11.5 - 14.5 %    PLATELET 321 608 - 323 K/uL    NRBC 0.2 (H) 0  WBC    ABSOLUTE NRBC 0.02 (H) 0.00 - 0.01 K/uL       Assessment and Plan:   IUP @ 34w4d   Patient Active Problem List    Diagnosis Date Noted    Gestational hypertension 2020    Cholestasis during pregnancy 2020    Preeclampsia 2017     29 y/o  at 34 4/7 wks with cholestasis of pregnancy and GHTN and now with lab changes. - Awaiting ultrasound and in person consultation with M today. NICU consult completed. - Pt received 1st dose of BMTZ yesterday afternoon at 4pm- due for 2nd dose today. - Reviewed lab findings with pt including a worsening Cr, and LFTs- slight bump in both. Plt count stable. - Fetus was breech on most recent ultrasound- will reevaluate today with ultrasound.  - GBS pending. Will plan to send COVID testing.

## 2020-07-02 NOTE — PROGRESS NOTES
Indication: Cholestasis 3rd Tri O26.613.   ____________________________________________________________________________  History: Age: 31 years. Current Pregnancy: Pre- pregnancy data: Weight 155 lbs. Height 5 ft 5 ins. BMI 25.8.  ____________________________________________________________________________  Dating:  Stated EDC:  EDC: 08/09/20 GA by stated EDC: 34w4d  Current Scan on: 07/02/20 EDC: 07/21/20 GA by current scan: 37w2d  Best Overall Assessment: 07/02/20 EDC: 08/09/20 Assessed GA: 34w4d  The calculation of the gestational age by current scan was based on BPD, HC, AC and FL. The Best Overall Assessment is based on the stated EDC.  ____________________________________________________________________________  General Evaluation:  Fetal heart activity: present. Fetal heart rate: 145 bpm.   Presentation: BREECH. Fetal movement: visible. Amniotic Fluid: normal. PRANAV  15.8 cm. Maximal vertical pocket 6.6 cm. Cord: normal. Fetal cord insertion site: Normal.   Placenta: posterior. ____________________________________________________________________________  Anatomy Scan:  Storm gestation. Biometry:  BPD 90.5 mm 94th% 36w5d (35w4d to 37w5d)  .8 mm 90th% 37w6d (35w1d to 40w4d)  .4 mm >95th% 37w4d (36w4d to 38w4d)  FL 71.9 mm 91st% 36w6d (33w5d to 40w0d)  .8 mm 89th%  HUM 67.0 mm >95th%  VENTRp 8.1 mm n/a  EFW (lbs/oz) 7 lbs 0 ozs  EFW (g) 3173 g  88th%       Fetal Anatomy:  Visualized with normal appearance: head, brain, abdominal wall, gastrointestinal tract, kidneys, bladder. Heart: The 4-chamber view was obtained but outflow tracts could not be adequately visualized. Summary of Ultrasound Findings:  Transabdominal US. U/S machine: [employer] E8 Expert. U/S view: limited by adiposity. Impression:  The fetal growth is appropriate    ____________________________________________________________________________  Fetal Wellbeing Assessment:  Amniotic fluid: normal. PRANAV: 15.8 cm. MVP: 6.6 cm. Q1: 4.8 cm. Q2: 4.4 cm. Q3: 6.6 cm. Biophysical Profile: Fetal body movements: normal (2), Fetal tone: normal (2), Fetal breathing movements: normal (2), Amniotic fluid volume: normal (2). Score 8 / 8. Summary: Reassuring fetal status. ____________________________________________________________________________  Doppler:  Fetal Doppler:  Umbilical Artery: PS 74.7 cm/s    ED 20.59 cm/s    S/D ratio 2.16     RI 0.54     PI 0.78     TAMX 30.53 cm/s     U/S Machine: [employer] E8 Expert.  ____________________________________________________________________________  Consultation:  Type of Consultation: Inpatient Consultation (20 min)  Consultant: Dr. Bay Sylvester  see in report.  ____________________________________________________________________________  Maternal Structures:  Cervical length 38.0 mm.  ____________________________________________________________________________  Report Summary:  Impression: Ms. Angie Ruvalcaba is a 16LI [id number] at 34w4 with cholestasis of pregnancy admitted to rule out pre-eclampsia. She initially had mild headache however she reports that it has resolved. I reviewed her inpatient blood pressures and they are all mild range. I reviewed blood pressures outside of pregnancy and reviewed her blood pressures with Dr. Maria Elena Berumen from her office, and based on these she is likely to have some degree of chronic hypertension. I reviewed her labs. Upr/cr was undetectable. Creatinine was slightly up from baseline of 0.8 to 1.04.  AST and ALT have improved since she was diagnosed with ICHP and started on UDCA. AST today was 81 and ALT was 140. Dr. Maria Elena Berumen let me know that her Hep B and Hep C testing were negative. Overall lab pattern is most consistent with IHCP, not severe pre-eclampsia. Today we see Breech SLIUP with EFW at 88th centile. Three week AC lead is noted. Fluid is normal.  BPP is 8/8.   Reassuring fetal surveillance. ASSESSMENT/COUNSELING/PLAN:    Ms. Sherri Carias most likely has some degree of chronic HTN with LFT pattern consistent with IHCP. She is now completing BMZ course and 24U prot collection is done at 2 pm.  Since she feels well and had not had severe range BPs, I think that outpatient mamagement with close follow up to ensure absence of pre-eclampsia is appropriate. I reviewed Pre-e precautions. She is checking BP at home and is SBP>160 or DBP>110 or malaise of headache, she will return to L&D immediately. I spent 30 min on this consult and >50% was spent on face-to-face counseling about pre-eclampsia, ICHP, probable CHTN, and timing of dleivery. Recommendations: I agree with plan to deliver at 36w0 for ICHP. So long as there are no severe range blood pressures and her NST is reassuring, Ms. Sherri Carias can be managed outpatient. Please check labs and have follow up with the patient on 7/6/20 or 7/7/20 to check for lab changes suspicious for pre-eclampsia. Continue weekly fetal surveillance until delivery.       Reagan Vidal MD  Maternal Fetal Medicine

## 2020-07-02 NOTE — PROGRESS NOTES
0730: Verbal shift change report given to VAMSI Santa RN (oncoming nurse) by HUMAIRA Cabezas RN (offgoing nurse). Report included the following information SBAR, Intake/Output, MAR and Recent Results. 6619: Dr. Hubert Castellano at bedside discussing plan of care; continue plan for MFM consult today. 1215: Pt returned from MFM consult to [address] [place] [address]  [phone].    1610: RN talked to Dr. Hubert Castellano via telephone; reviewed pt's labs, reactive NST, and MFM consult; orders to discharge patient. 1640: I have reviewed discharge instructions with the patient and spouse. Educated pt on checking blood pressure at home and call provider if in sever range, check blood pressure and call provider if a headache develops and/or for vision changes. Follow up in the office on 7/6/20. The patient and spouse verbalized understanding.

## 2020-07-02 NOTE — PROGRESS NOTES
Bedside and Verbal shift change report given to HUMAIRA Cabezas RN (oncoming nurse) by Jay Hernandez RN (offgoing nurse). Report included the following information SBAR, Kardex, Procedure Summary, Intake/Output, MAR, Accordion, Recent Results and Med Rec Status.

## 2020-07-04 LAB
BACTERIA SPEC CULT: NORMAL
SERVICE CMNT-IMP: NORMAL

## 2020-07-06 ENCOUNTER — ANESTHESIA (OUTPATIENT)
Dept: LABOR AND DELIVERY | Age: 30
End: 2020-07-06
Payer: COMMERCIAL

## 2020-07-06 ENCOUNTER — HOSPITAL ENCOUNTER (INPATIENT)
Age: 30
LOS: 3 days | Discharge: HOME | End: 2020-07-09
Attending: OBSTETRICS & GYNECOLOGY | Admitting: OBSTETRICS & GYNECOLOGY
Payer: COMMERCIAL

## 2020-07-06 ENCOUNTER — ANESTHESIA EVENT (OUTPATIENT)
Dept: LABOR AND DELIVERY | Age: 30
End: 2020-07-06
Payer: COMMERCIAL

## 2020-07-06 DIAGNOSIS — Z48.89 ENCOUNTER FOR POSTOPERATIVE CARE: Primary | ICD-10-CM

## 2020-07-06 PROBLEM — O14.90 PRE-ECLAMPSIA: Status: ACTIVE | Noted: 2020-07-06

## 2020-07-06 LAB
ABO + RH BLD: NORMAL
ALBUMIN SERPL-MCNC: 2.5 G/DL (ref 3.5–5)
ALBUMIN/GLOB SERPL: 0.6 {RATIO} (ref 1.1–2.2)
ALP SERPL-CCNC: 293 U/L (ref 45–117)
ALT SERPL-CCNC: 580 U/L (ref 12–78)
ANION GAP SERPL CALC-SCNC: 11 MMOL/L (ref 5–15)
AST SERPL-CCNC: 357 U/L (ref 15–37)
BILIRUB SERPL-MCNC: 1 MG/DL (ref 0.2–1)
BLOOD GROUP ANTIBODIES SERPL: NORMAL
BUN SERPL-MCNC: 13 MG/DL (ref 6–20)
BUN/CREAT SERPL: 14 (ref 12–20)
CALCIUM SERPL-MCNC: 9.1 MG/DL (ref 8.5–10.1)
CHLORIDE SERPL-SCNC: 106 MMOL/L (ref 97–108)
CO2 SERPL-SCNC: 20 MMOL/L (ref 21–32)
CREAT SERPL-MCNC: 0.95 MG/DL (ref 0.55–1.02)
CREAT UR-MCNC: 31.5 MG/DL
ERYTHROCYTE [DISTWIDTH] IN BLOOD BY AUTOMATED COUNT: 14.5 % (ref 11.5–14.5)
GLOBULIN SER CALC-MCNC: 3.9 G/DL (ref 2–4)
GLUCOSE SERPL-MCNC: 79 MG/DL (ref 65–100)
HCT VFR BLD AUTO: 38.9 % (ref 35–47)
HGB BLD-MCNC: 12.8 G/DL (ref 11.5–16)
LDH SERPL L TO P-CCNC: 508 U/L (ref 81–246)
MCH RBC QN AUTO: 30.1 PG (ref 26–34)
MCHC RBC AUTO-ENTMCNC: 32.9 G/DL (ref 30–36.5)
MCV RBC AUTO: 91.5 FL (ref 80–99)
NRBC # BLD: 0.17 K/UL (ref 0–0.01)
NRBC BLD-RTO: 1.3 PER 100 WBC
PLATELET # BLD AUTO: 215 K/UL (ref 150–400)
POTASSIUM SERPL-SCNC: 4.3 MMOL/L (ref 3.5–5.1)
PROT SERPL-MCNC: 6.4 G/DL (ref 6.4–8.2)
PROT UR-MCNC: 8 MG/DL (ref 0–11.9)
PROT/CREAT UR-RTO: 0.3
RBC # BLD AUTO: 4.25 M/UL (ref 3.8–5.2)
SODIUM SERPL-SCNC: 137 MMOL/L (ref 136–145)
SPECIMEN EXP DATE BLD: NORMAL
URATE SERPL-MCNC: 8.7 MG/DL (ref 2.6–6)
WBC # BLD AUTO: 13.5 K/UL (ref 3.6–11)

## 2020-07-06 PROCEDURE — 77030031139 HC SUT VCRL2 J&J -A

## 2020-07-06 PROCEDURE — 75410000002 HC LABOR FEE PER 1 HR

## 2020-07-06 PROCEDURE — 76010000392 HC C SECN EA ADDL 0.5 HR: Performed by: OBSTETRICS & GYNECOLOGY

## 2020-07-06 PROCEDURE — 74011000250 HC RX REV CODE- 250: Performed by: NURSE ANESTHETIST, CERTIFIED REGISTERED

## 2020-07-06 PROCEDURE — C1765 ADHESION BARRIER: HCPCS

## 2020-07-06 PROCEDURE — 74011000250 HC RX REV CODE- 250: Performed by: ANESTHESIOLOGY

## 2020-07-06 PROCEDURE — 77030014125 HC TY EPDRL BBMI -B: Performed by: ANESTHESIOLOGY

## 2020-07-06 PROCEDURE — 77030040012

## 2020-07-06 PROCEDURE — 83615 LACTATE (LD) (LDH) ENZYME: CPT

## 2020-07-06 PROCEDURE — 74011250636 HC RX REV CODE- 250/636: Performed by: OBSTETRICS & GYNECOLOGY

## 2020-07-06 PROCEDURE — 84550 ASSAY OF BLOOD/URIC ACID: CPT

## 2020-07-06 PROCEDURE — 77030018846 HC SOL IRR STRL H20 ICUM -A

## 2020-07-06 PROCEDURE — 86900 BLOOD TYPING SEROLOGIC ABO: CPT

## 2020-07-06 PROCEDURE — 36415 COLL VENOUS BLD VENIPUNCTURE: CPT

## 2020-07-06 PROCEDURE — 77030011220 HC DEV ELECSURG COVD -B

## 2020-07-06 PROCEDURE — 77030002974 HC SUT PLN J&J -A

## 2020-07-06 PROCEDURE — 77030041076 HC DRSG AG OPTICELL MDII -A

## 2020-07-06 PROCEDURE — 74011000250 HC RX REV CODE- 250

## 2020-07-06 PROCEDURE — 76060000078 HC EPIDURAL ANESTHESIA: Performed by: OBSTETRICS & GYNECOLOGY

## 2020-07-06 PROCEDURE — 74011250636 HC RX REV CODE- 250/636

## 2020-07-06 PROCEDURE — 80053 COMPREHEN METABOLIC PANEL: CPT

## 2020-07-06 PROCEDURE — 74011250636 HC RX REV CODE- 250/636: Performed by: NURSE ANESTHETIST, CERTIFIED REGISTERED

## 2020-07-06 PROCEDURE — 75410000003 HC RECOV DEL/VAG/CSECN EA 0.5 HR: Performed by: OBSTETRICS & GYNECOLOGY

## 2020-07-06 PROCEDURE — 77030002933 HC SUT MCRYL J&J -A

## 2020-07-06 PROCEDURE — 85027 COMPLETE CBC AUTOMATED: CPT

## 2020-07-06 PROCEDURE — 84156 ASSAY OF PROTEIN URINE: CPT

## 2020-07-06 PROCEDURE — 76010000391 HC C SECN FIRST 1 HR: Performed by: OBSTETRICS & GYNECOLOGY

## 2020-07-06 PROCEDURE — 74011000258 HC RX REV CODE- 258: Performed by: OBSTETRICS & GYNECOLOGY

## 2020-07-06 PROCEDURE — 88307 TISSUE EXAM BY PATHOLOGIST: CPT

## 2020-07-06 PROCEDURE — 65270000029 HC RM PRIVATE

## 2020-07-06 PROCEDURE — 77030040361 HC SLV COMPR DVT MDII -B

## 2020-07-06 PROCEDURE — 74011250636 HC RX REV CODE- 250/636: Performed by: ANESTHESIOLOGY

## 2020-07-06 RX ORDER — ONDANSETRON 2 MG/ML
4 INJECTION INTRAMUSCULAR; INTRAVENOUS
Status: DISCONTINUED | OUTPATIENT
Start: 2020-07-06 | End: 2020-07-09 | Stop reason: HOSPADM

## 2020-07-06 RX ORDER — SODIUM CHLORIDE 0.9 % (FLUSH) 0.9 %
5-40 SYRINGE (ML) INJECTION EVERY 8 HOURS
Status: DISCONTINUED | OUTPATIENT
Start: 2020-07-06 | End: 2020-07-06 | Stop reason: HOSPADM

## 2020-07-06 RX ORDER — ACETAMINOPHEN 10 MG/ML
INJECTION, SOLUTION INTRAVENOUS AS NEEDED
Status: DISCONTINUED | OUTPATIENT
Start: 2020-07-06 | End: 2020-07-06 | Stop reason: HOSPADM

## 2020-07-06 RX ORDER — OXYTOCIN/RINGER'S LACTATE 20/1000 ML
999 PLASTIC BAG, INJECTION (ML) INTRAVENOUS AS NEEDED
Status: DISCONTINUED | OUTPATIENT
Start: 2020-07-06 | End: 2020-07-09 | Stop reason: HOSPADM

## 2020-07-06 RX ORDER — OXYCODONE AND ACETAMINOPHEN 5; 325 MG/1; MG/1
1 TABLET ORAL
Status: DISCONTINUED | OUTPATIENT
Start: 2020-07-06 | End: 2020-07-06

## 2020-07-06 RX ORDER — DIPHENHYDRAMINE HYDROCHLORIDE 50 MG/ML
12.5 INJECTION, SOLUTION INTRAMUSCULAR; INTRAVENOUS
Status: DISCONTINUED | OUTPATIENT
Start: 2020-07-06 | End: 2020-07-09 | Stop reason: HOSPADM

## 2020-07-06 RX ORDER — MAGNESIUM SULFATE HEPTAHYDRATE 40 MG/ML
2 INJECTION, SOLUTION INTRAVENOUS ONCE
Status: DISCONTINUED | OUTPATIENT
Start: 2020-07-06 | End: 2020-07-06

## 2020-07-06 RX ORDER — SODIUM CHLORIDE, SODIUM LACTATE, POTASSIUM CHLORIDE, CALCIUM CHLORIDE 600; 310; 30; 20 MG/100ML; MG/100ML; MG/100ML; MG/100ML
INJECTION, SOLUTION INTRAVENOUS
Status: DISCONTINUED | OUTPATIENT
Start: 2020-07-06 | End: 2020-07-06 | Stop reason: HOSPADM

## 2020-07-06 RX ORDER — MAGNESIUM SULFATE HEPTAHYDRATE 40 MG/ML
4 INJECTION, SOLUTION INTRAVENOUS ONCE
Status: COMPLETED | OUTPATIENT
Start: 2020-07-06 | End: 2020-07-06

## 2020-07-06 RX ORDER — BUPIVACAINE HYDROCHLORIDE 7.5 MG/ML
INJECTION, SOLUTION INTRASPINAL
Status: COMPLETED | OUTPATIENT
Start: 2020-07-06 | End: 2020-07-06

## 2020-07-06 RX ORDER — OXYCODONE HYDROCHLORIDE 5 MG/1
5 TABLET ORAL
Status: DISCONTINUED | OUTPATIENT
Start: 2020-07-06 | End: 2020-07-09 | Stop reason: HOSPADM

## 2020-07-06 RX ORDER — MORPHINE SULFATE 10 MG/ML
10 INJECTION, SOLUTION INTRAMUSCULAR; INTRAVENOUS
Status: DISCONTINUED | OUTPATIENT
Start: 2020-07-06 | End: 2020-07-09 | Stop reason: HOSPADM

## 2020-07-06 RX ORDER — AMMONIA 15 % (W/V)
1 AMPUL (EA) INHALATION AS NEEDED
Status: DISCONTINUED | OUTPATIENT
Start: 2020-07-06 | End: 2020-07-09 | Stop reason: HOSPADM

## 2020-07-06 RX ORDER — SODIUM CHLORIDE 0.9 % (FLUSH) 0.9 %
5-40 SYRINGE (ML) INJECTION AS NEEDED
Status: DISCONTINUED | OUTPATIENT
Start: 2020-07-06 | End: 2020-07-09 | Stop reason: HOSPADM

## 2020-07-06 RX ORDER — HYDROCORTISONE 1 %
CREAM (GRAM) TOPICAL AS NEEDED
Status: DISCONTINUED | OUTPATIENT
Start: 2020-07-06 | End: 2020-07-09 | Stop reason: HOSPADM

## 2020-07-06 RX ORDER — KETOROLAC TROMETHAMINE 30 MG/ML
30 INJECTION, SOLUTION INTRAMUSCULAR; INTRAVENOUS
Status: DISPENSED | OUTPATIENT
Start: 2020-07-06 | End: 2020-07-07

## 2020-07-06 RX ORDER — OXYCODONE HYDROCHLORIDE 5 MG/1
10 TABLET ORAL
Status: DISCONTINUED | OUTPATIENT
Start: 2020-07-06 | End: 2020-07-09 | Stop reason: HOSPADM

## 2020-07-06 RX ORDER — MORPHINE SULFATE 10 MG/ML
6 INJECTION, SOLUTION INTRAMUSCULAR; INTRAVENOUS
Status: DISCONTINUED | OUTPATIENT
Start: 2020-07-06 | End: 2020-07-09 | Stop reason: HOSPADM

## 2020-07-06 RX ORDER — ONDANSETRON 2 MG/ML
4 INJECTION INTRAMUSCULAR; INTRAVENOUS
Status: DISCONTINUED | OUTPATIENT
Start: 2020-07-06 | End: 2020-07-06 | Stop reason: HOSPADM

## 2020-07-06 RX ORDER — OXYTOCIN/0.9 % SODIUM CHLORIDE 30/500 ML
0-25 PLASTIC BAG, INJECTION (ML) INTRAVENOUS
Status: DISCONTINUED | OUTPATIENT
Start: 2020-07-07 | End: 2020-07-06

## 2020-07-06 RX ORDER — SODIUM CHLORIDE 0.9 % (FLUSH) 0.9 %
5-40 SYRINGE (ML) INJECTION AS NEEDED
Status: DISCONTINUED | OUTPATIENT
Start: 2020-07-06 | End: 2020-07-06 | Stop reason: HOSPADM

## 2020-07-06 RX ORDER — EPHEDRINE SULFATE/0.9% NACL/PF 50 MG/5 ML
SYRINGE (ML) INTRAVENOUS AS NEEDED
Status: DISCONTINUED | OUTPATIENT
Start: 2020-07-06 | End: 2020-07-06 | Stop reason: HOSPADM

## 2020-07-06 RX ORDER — OXYTOCIN/RINGER'S LACTATE 20/1000 ML
PLASTIC BAG, INJECTION (ML) INTRAVENOUS
Status: DISPENSED
Start: 2020-07-06 | End: 2020-07-07

## 2020-07-06 RX ORDER — TERBUTALINE SULFATE 1 MG/ML
0.25 INJECTION SUBCUTANEOUS AS NEEDED
Status: DISCONTINUED | OUTPATIENT
Start: 2020-07-06 | End: 2020-07-06 | Stop reason: HOSPADM

## 2020-07-06 RX ORDER — CEFAZOLIN SODIUM/WATER 2 G/20 ML
2 SYRINGE (ML) INTRAVENOUS ONCE
Status: DISCONTINUED | OUTPATIENT
Start: 2020-07-07 | End: 2020-07-06

## 2020-07-06 RX ORDER — TERBUTALINE SULFATE 1 MG/ML
0.25 INJECTION SUBCUTANEOUS
Status: COMPLETED | OUTPATIENT
Start: 2020-07-06 | End: 2020-07-06

## 2020-07-06 RX ORDER — SODIUM CHLORIDE, SODIUM LACTATE, POTASSIUM CHLORIDE, CALCIUM CHLORIDE 600; 310; 30; 20 MG/100ML; MG/100ML; MG/100ML; MG/100ML
125 INJECTION, SOLUTION INTRAVENOUS CONTINUOUS
Status: DISCONTINUED | OUTPATIENT
Start: 2020-07-06 | End: 2020-07-06 | Stop reason: SDUPTHER

## 2020-07-06 RX ORDER — ONDANSETRON 2 MG/ML
4 INJECTION INTRAMUSCULAR; INTRAVENOUS
Status: DISCONTINUED | OUTPATIENT
Start: 2020-07-06 | End: 2020-07-08 | Stop reason: SDUPTHER

## 2020-07-06 RX ORDER — SIMETHICONE 80 MG
80 TABLET,CHEWABLE ORAL
Status: DISCONTINUED | OUTPATIENT
Start: 2020-07-06 | End: 2020-07-09 | Stop reason: HOSPADM

## 2020-07-06 RX ORDER — IBUPROFEN 400 MG/1
800 TABLET ORAL EVERY 8 HOURS
Status: DISCONTINUED | OUTPATIENT
Start: 2020-07-06 | End: 2020-07-09 | Stop reason: HOSPADM

## 2020-07-06 RX ORDER — CEFAZOLIN SODIUM/WATER 2 G/20 ML
SYRINGE (ML) INTRAVENOUS
Status: COMPLETED
Start: 2020-07-06 | End: 2020-07-06

## 2020-07-06 RX ORDER — ONDANSETRON 2 MG/ML
INJECTION INTRAMUSCULAR; INTRAVENOUS AS NEEDED
Status: DISCONTINUED | OUTPATIENT
Start: 2020-07-06 | End: 2020-07-06 | Stop reason: HOSPADM

## 2020-07-06 RX ORDER — FENTANYL CITRATE 50 UG/ML
50 INJECTION, SOLUTION INTRAMUSCULAR; INTRAVENOUS
Status: DISCONTINUED | OUTPATIENT
Start: 2020-07-06 | End: 2020-07-06 | Stop reason: HOSPADM

## 2020-07-06 RX ORDER — SODIUM CHLORIDE, SODIUM LACTATE, POTASSIUM CHLORIDE, CALCIUM CHLORIDE 600; 310; 30; 20 MG/100ML; MG/100ML; MG/100ML; MG/100ML
75 INJECTION, SOLUTION INTRAVENOUS CONTINUOUS
Status: DISCONTINUED | OUTPATIENT
Start: 2020-07-06 | End: 2020-07-09 | Stop reason: HOSPADM

## 2020-07-06 RX ORDER — OXYTOCIN/RINGER'S LACTATE 20/1000 ML
125-1000 PLASTIC BAG, INJECTION (ML) INTRAVENOUS
Status: DISCONTINUED | OUTPATIENT
Start: 2020-07-06 | End: 2020-07-06 | Stop reason: HOSPADM

## 2020-07-06 RX ORDER — BUTORPHANOL TARTRATE 1 MG/ML
1 INJECTION INTRAMUSCULAR; INTRAVENOUS
Status: DISCONTINUED | OUTPATIENT
Start: 2020-07-06 | End: 2020-07-06 | Stop reason: HOSPADM

## 2020-07-06 RX ORDER — MORPHINE SULFATE 1 MG/ML
INJECTION, SOLUTION EPIDURAL; INTRATHECAL; INTRAVENOUS
Status: COMPLETED | OUTPATIENT
Start: 2020-07-06 | End: 2020-07-06

## 2020-07-06 RX ORDER — DOCUSATE SODIUM 100 MG/1
100 CAPSULE, LIQUID FILLED ORAL
Status: DISCONTINUED | OUTPATIENT
Start: 2020-07-06 | End: 2020-07-09 | Stop reason: HOSPADM

## 2020-07-06 RX ORDER — OXYTOCIN/RINGER'S LACTATE 20/1000 ML
125 PLASTIC BAG, INJECTION (ML) INTRAVENOUS AS NEEDED
Status: DISCONTINUED | OUTPATIENT
Start: 2020-07-06 | End: 2020-07-09 | Stop reason: HOSPADM

## 2020-07-06 RX ORDER — ACETAMINOPHEN 325 MG/1
650 TABLET ORAL
Status: DISCONTINUED | OUTPATIENT
Start: 2020-07-06 | End: 2020-07-06

## 2020-07-06 RX ORDER — PHENYLEPHRINE HCL IN 0.9% NACL 0.4MG/10ML
SYRINGE (ML) INTRAVENOUS AS NEEDED
Status: DISCONTINUED | OUTPATIENT
Start: 2020-07-06 | End: 2020-07-06 | Stop reason: HOSPADM

## 2020-07-06 RX ORDER — CEFAZOLIN SODIUM/WATER 2 G/20 ML
2 SYRINGE (ML) INTRAVENOUS ONCE
Status: COMPLETED | OUTPATIENT
Start: 2020-07-06 | End: 2020-07-06

## 2020-07-06 RX ORDER — OXYCODONE AND ACETAMINOPHEN 5; 325 MG/1; MG/1
2 TABLET ORAL
Status: DISCONTINUED | OUTPATIENT
Start: 2020-07-06 | End: 2020-07-06

## 2020-07-06 RX ORDER — SODIUM CHLORIDE 0.9 % (FLUSH) 0.9 %
5-40 SYRINGE (ML) INJECTION EVERY 8 HOURS
Status: DISCONTINUED | OUTPATIENT
Start: 2020-07-06 | End: 2020-07-09 | Stop reason: HOSPADM

## 2020-07-06 RX ADMIN — Medication 80 MCG: at 18:01

## 2020-07-06 RX ADMIN — BUPIVACAINE HYDROCHLORIDE IN DEXTROSE 11 MG: 7.5 INJECTION, SOLUTION SUBARACHNOID at 17:58

## 2020-07-06 RX ADMIN — Medication 2 G: at 17:43

## 2020-07-06 RX ADMIN — SODIUM CHLORIDE, SODIUM LACTATE, POTASSIUM CHLORIDE, AND CALCIUM CHLORIDE 1000 ML: 600; 310; 30; 20 INJECTION, SOLUTION INTRAVENOUS at 17:43

## 2020-07-06 RX ADMIN — SODIUM CHLORIDE, SODIUM LACTATE, POTASSIUM CHLORIDE, AND CALCIUM CHLORIDE 75 ML/HR: 600; 310; 30; 20 INJECTION, SOLUTION INTRAVENOUS at 19:42

## 2020-07-06 RX ADMIN — ACETAMINOPHEN 1000 MG: 10 INJECTION, SOLUTION INTRAVENOUS at 18:20

## 2020-07-06 RX ADMIN — ONDANSETRON HYDROCHLORIDE 4 MG: 2 INJECTION, SOLUTION INTRAMUSCULAR; INTRAVENOUS at 17:53

## 2020-07-06 RX ADMIN — MAGNESIUM SULFATE HEPTAHYDRATE 4 G: 40 INJECTION, SOLUTION INTRAVENOUS at 19:42

## 2020-07-06 RX ADMIN — FENTANYL CITRATE 50 MCG: 50 INJECTION INTRAMUSCULAR; INTRAVENOUS at 17:04

## 2020-07-06 RX ADMIN — MAGNESIUM SULFATE HEPTAHYDRATE 2 G/HR: 500 INJECTION, SOLUTION INTRAMUSCULAR; INTRAVENOUS at 20:11

## 2020-07-06 RX ADMIN — MORPHINE SULFATE 0.2 MG: 1 INJECTION, SOLUTION EPIDURAL; INTRATHECAL; INTRAVENOUS at 17:58

## 2020-07-06 RX ADMIN — Medication 999 ML/HR: at 18:14

## 2020-07-06 RX ADMIN — Medication 80 MCG: at 18:14

## 2020-07-06 RX ADMIN — CEFAZOLIN SODIUM 2 G: 300 INJECTION, POWDER, LYOPHILIZED, FOR SOLUTION INTRAVENOUS at 17:43

## 2020-07-06 RX ADMIN — Medication 10 MG: at 18:00

## 2020-07-06 RX ADMIN — TERBUTALINE SULFATE 0.25 MG: 1 INJECTION SUBCUTANEOUS at 16:48

## 2020-07-06 RX ADMIN — SODIUM CHLORIDE, POTASSIUM CHLORIDE, SODIUM LACTATE AND CALCIUM CHLORIDE: 600; 310; 30; 20 INJECTION, SOLUTION INTRAVENOUS at 17:50

## 2020-07-06 RX ADMIN — KETOROLAC TROMETHAMINE 30 MG: 30 INJECTION, SOLUTION INTRAMUSCULAR at 20:51

## 2020-07-06 NOTE — ANESTHESIA PROCEDURE NOTES
Spinal Block    Start time: 7/6/2020 5:53 PM  End time: 7/6/2020 6:00 PM  Performed by: Carin Mason MD  Authorized by: Carin Mason MD     Pre-procedure:   Indications: primary anesthetic  Preanesthetic Checklist: patient identified, risks and benefits discussed, anesthesia consent, site marked, patient being monitored and timeout performed    Timeout Time: 17:50          Spinal Block:   Patient Position:  Seated    Prep: Betadine      Location:  L3-4  Technique:  Single shot        Needle:   Needle Type:  Pencil-tip  Needle Gauge:  25 G  Attempts:  1      Events: CSF confirmed, no blood with aspiration and no paresthesia        Assessment:  Insertion:  Uncomplicated  Patient tolerance:  Patient tolerated the procedure well with no immediate complications

## 2020-07-06 NOTE — ANESTHESIA PREPROCEDURE EVALUATION
Relevant Problems   No relevant active problems       Anesthetic History   No history of anesthetic complications            Review of Systems / Medical History  Patient summary reviewed, nursing notes reviewed and pertinent labs reviewed    Pulmonary  Within defined limits                 Neuro/Psych   Within defined limits           Cardiovascular  Within defined limits  Hypertension                   GI/Hepatic/Renal  Within defined limits         Liver disease     Endo/Other  Within defined limits           Other Findings   Comments: Severe pre-eclampsia  Attempted version           Physical Exam    Airway  Mallampati: II  TM Distance: > 6 cm  Neck ROM: normal range of motion   Mouth opening: Normal     Cardiovascular  Regular rate and rhythm,  S1 and S2 normal,  no murmur, click, rub, or gallop             Dental  No notable dental hx       Pulmonary  Breath sounds clear to auscultation               Abdominal  GI exam deferred       Other Findings            Anesthetic Plan    ASA: 3  Anesthesia type: spinal      Post-op pain plan if not by surgeon: intrathecal opiates    Induction: Intravenous  Anesthetic plan and risks discussed with: Patient

## 2020-07-06 NOTE — H&P
History & Physical    Name: Gosia Copeland MRN: [id number]  SSN: [id number]    YOB: 1990  Age: 31 y.o. Sex: female        Subjective:     Estimated Date of Delivery: 20  OB History    Para Term  AB Living   2 1   1   1   SAB TAB Ectopic Molar Multiple Live Births           0 1      # Outcome Date GA Lbr Leroy/2nd Weight Sex Delivery Anes PTL Lv   2 Current            1  06/10/17 34w4d 04:30 / 00:49 2.242 kg F Vag-Spont EPIDURAL AN N KAREN      Complications: Cholestasis, Preeclampsia, third trimester       Ms. Polanco Yazidism is admitted with pregnancy at 35w1d for induction of labor for pre-eclampsia with severe features and cholestasis of pregnancy. Prenatal course was complicated by cholestasis of pregnancy (LFT's one month ago 250s and 500s which had been trending down until today). Pt was admitted last week for elevated Creatinine to 1.2 but his improved to 1.0 prior to discharge. She received BMZ course while here last week. Pt also has a h/o cholestasis and pre-eclampsia with HELLP in prior pregnancy requiring delivery at 34 weeks. Pt seen in office today by Dr Kelin Johnson and sent here when pt had an episode of visual changes (similar to aura prior to migraines). Pt states she has had a 2nd episode of this since arrival to L&D and has a mild 3/10 headache. Vision slightly blurry since then. Please see prenatal records for details.     Past Medical History:   Diagnosis Date    Cholestasis during pregnancy 2020    Gestational hypertension     Preeclampsia 6/3/2017    Psychiatric problem     Depression and Anxiety - unmedicated     Past Surgical History:   Procedure Laterality Date    HX OTHER SURGICAL  2007    Banner teeth extraction     Social History     Occupational History    Not on file   Tobacco Use    Smoking status: Never Smoker    Smokeless tobacco: Never Used   Substance and Sexual Activity    Alcohol use: Yes     Comment: 2-3 glasses per week prior to pregnancy    Drug use: No    Sexual activity: Yes     Partners: Male     Family History   Problem Relation Age of Onset    Hypertension Mother     Hypertension Father     Cancer Father        Allergies   Allergen Reactions    Pcn [Penicillins] Unknown (comments)     Prior to Admission medications    Medication Sig Start Date End Date Taking? Authorizing Provider   PNV No12-Iron-FA-DSS-OM-3 29 mg iron-1 mg -50 mg CPKD Take  by mouth. Yes Provider, Historical   ursodioL (ACTIGALL) 300 mg capsule Take 300 mg by mouth two (2) times a day. Yes Provider, Historical   famotidine (Pepcid) 20 mg tablet Take 20 mg by mouth two (2) times a day. Yes Provider, Historical   aspirin delayed-release 81 mg tablet Take  by mouth daily. Yes Provider, Historical        Review of Systems: Pertinent items are noted in HPI. Objective:     Vitals:  Vitals:    07/06/20 1214 07/06/20 1224 07/06/20 1331 07/06/20 1430   BP: (!) 136/92 135/87 125/71 132/74   Pulse: 80 79 83 63   Resp:       Temp:            Physical Exam:  Patient without distress. Abdomen: soft, nontender, gravid  Cervical Exam: 1 cm dilated    50% effaced    -3 station    Lower Extremities:  - Edema 1+  Membranes:  Intact  Fetal Heart Rate: Reactive    Iron Gate: every 3-5 mins but not feeling them    Prenatal Labs:   Lab Results   Component Value Date/Time    ABO/Rh(D) O POSITIVE 07/06/2020 12:02 PM    Rubella, External Immune 01/02/2020    GrBStrep, External negative 06/03/2017    HBsAg, External Negative 01/02/2020    HIV, External Non-reactive 01/02/2020    RPR, External non reactive 12/07/2016    Gonorrhea, External Negative 01/02/2020    Chlamydia, External Negative 01/02/2020    ABO,Rh O Positive 01/02/2020         Assessment/Plan:     Active Problems:    * No active hospital problems. *       Plan: Admit for pre-eclampsia with severe features (elevated LFT's and creatinine and now with UPCR 0.3 and visual changes/ruq pain). Bp's mild range.  Discussed with pt r/b of ECV/induction of labor vs  delivery. Pt and  would like trial of ECV with induction if successful. Category I tracing. Will monitor bloodwork every 4-6 hours. .  Group B Strep was not tested.

## 2020-07-06 NOTE — PROGRESS NOTES
Progress Note  Patient seen, fetal heart rate and contraction pattern evaluated, patient examined. Discussed labs c/w pre-eclampsia with severe features by labs/symptoms/upcr. Pt desires trial of ECV after d/w pt and  r/b of procedure. No data found. Category I tracing. Bedside ultrasound performed and again confirmed breech presentation with fetal head maternal ruq. Pt given terbutaline as well as fentanyl. With Dr Rosalina Pollard, three attempts made to vert baby to vertex presentation. First attempt with rotation counterclockwise (forward roll), Second attempt clockwise and third attempt with counterclockwise rotation. Unable to successfully get baby to rotate. Good fetal heart tones throughout. Recommend proceeding with  delivery. Pt and  in agreement. Will proceed.

## 2020-07-06 NOTE — PROGRESS NOTES
1127: G2,P1, pt of Dr. Mary Ellen Lopez arrived ambulatory to [facility] after being evaluated in the office today, BPs were elevated and pt is complaining of occasional epigastric pain, headaches, and visual changes. 1245: Dr. Tadeo Helton at bedside to assess pt, bedside US performed, breech position confirmed. Discussing with pt ECV vs.  if proceeding with delivery. 1259: SVE by Dr. Tadeo Helton, 1.. Awaiting lab results, likely planning to proceed with delivery today, ECV and induction or . 1655: Dr. Tadeo Helton at bedside to perform ECV with assistance from Dr. Araceli Aguilar. 1705: ECV unsuccessful, decision to proceed with  made. 175: Dr. Maty Gross in the room to explain spinal procedure, opportunity for questions provided, all questions answered. 175: Pt in OR1    1903: Pt transferred back to LDR 10.     2: 4g mag bolus started. 2330: Bedside and Verbal shift change report given to VALENTE Aguilar RN (oncoming nurse) by IDVYA [facility]WS [facility] (DP/SNF). Ginna Osei RN (offgoing nurse). Report included the following information SBAR, OR Summary, Procedure Summary, Intake/Output, MAR and Recent Results.

## 2020-07-06 NOTE — BRIEF OP NOTE
Brief Postoperative Note    Patient: Taniya Morales  YOB: 1990  MRN: [id number]    Date of Procedure: 2020     Pre-Op Diagnosis: 35 weeks, pre-eclampsia with severe features, cholestasis of pregnancy, Breech-s/p failed ECV    Post-Op Diagnosis: Same as preoperative diagnosis. Procedure(s):   SECTION-Primary LTCS    Surgeon(s):  Per Rivas DO    Surgical Assistant: None    Anesthesia: Epidural     Estimated Blood Loss (mL): 8991    Complications: None    Specimens:   ID Type Source Tests Collected by Time Destination   1 : Placenta Fresh Uterine  Per Rivas DO 2020 1824 Pathology        Implants:seprafilm  Drains: * No LDAs found *    Findings: LBMI in breech presentation. Meconium stained amniotic fluid. Apgars 8,9. Weight pending. Normal maternal anatomy.     Electronically Signed by Valeria Melchor DO on 2020 at 6:57 PM

## 2020-07-07 LAB
ALBUMIN SERPL-MCNC: 1.9 G/DL (ref 3.5–5)
ALBUMIN SERPL-MCNC: 2 G/DL (ref 3.5–5)
ALBUMIN/GLOB SERPL: 0.6 {RATIO} (ref 1.1–2.2)
ALBUMIN/GLOB SERPL: 0.6 {RATIO} (ref 1.1–2.2)
ALP SERPL-CCNC: 252 U/L (ref 45–117)
ALP SERPL-CCNC: 261 U/L (ref 45–117)
ALT SERPL-CCNC: 373 U/L (ref 12–78)
ALT SERPL-CCNC: 474 U/L (ref 12–78)
ANION GAP SERPL CALC-SCNC: 10 MMOL/L (ref 5–15)
ANION GAP SERPL CALC-SCNC: 13 MMOL/L (ref 5–15)
AST SERPL-CCNC: 182 U/L (ref 15–37)
AST SERPL-CCNC: 285 U/L (ref 15–37)
BASOPHILS # BLD: 0.1 K/UL (ref 0–0.1)
BASOPHILS NFR BLD: 0 % (ref 0–1)
BILIRUB SERPL-MCNC: 0.8 MG/DL (ref 0.2–1)
BILIRUB SERPL-MCNC: 0.8 MG/DL (ref 0.2–1)
BUN SERPL-MCNC: 11 MG/DL (ref 6–20)
BUN SERPL-MCNC: 13 MG/DL (ref 6–20)
BUN/CREAT SERPL: 11 (ref 12–20)
BUN/CREAT SERPL: 12 (ref 12–20)
CALCIUM SERPL-MCNC: 7.1 MG/DL (ref 8.5–10.1)
CALCIUM SERPL-MCNC: 8.3 MG/DL (ref 8.5–10.1)
CHLORIDE SERPL-SCNC: 100 MMOL/L (ref 97–108)
CHLORIDE SERPL-SCNC: 103 MMOL/L (ref 97–108)
CO2 SERPL-SCNC: 19 MMOL/L (ref 21–32)
CO2 SERPL-SCNC: 23 MMOL/L (ref 21–32)
CREAT SERPL-MCNC: 0.93 MG/DL (ref 0.55–1.02)
CREAT SERPL-MCNC: 1.21 MG/DL (ref 0.55–1.02)
DIFFERENTIAL METHOD BLD: ABNORMAL
EOSINOPHIL # BLD: 0 K/UL (ref 0–0.4)
EOSINOPHIL NFR BLD: 0 % (ref 0–7)
ERYTHROCYTE [DISTWIDTH] IN BLOOD BY AUTOMATED COUNT: 14.1 % (ref 11.5–14.5)
ERYTHROCYTE [DISTWIDTH] IN BLOOD BY AUTOMATED COUNT: 14.4 % (ref 11.5–14.5)
GLOBULIN SER CALC-MCNC: 3.2 G/DL (ref 2–4)
GLOBULIN SER CALC-MCNC: 3.3 G/DL (ref 2–4)
GLUCOSE SERPL-MCNC: 115 MG/DL (ref 65–100)
GLUCOSE SERPL-MCNC: 136 MG/DL (ref 65–100)
HCT VFR BLD AUTO: 35.5 % (ref 35–47)
HCT VFR BLD AUTO: 36.5 % (ref 35–47)
HGB BLD-MCNC: 11.6 G/DL (ref 11.5–16)
HGB BLD-MCNC: 12.3 G/DL (ref 11.5–16)
IMM GRANULOCYTES # BLD AUTO: 0.3 K/UL (ref 0–0.04)
IMM GRANULOCYTES NFR BLD AUTO: 2 % (ref 0–0.5)
LYMPHOCYTES # BLD: 2.5 K/UL (ref 0.8–3.5)
LYMPHOCYTES NFR BLD: 17 % (ref 12–49)
MCH RBC QN AUTO: 30.1 PG (ref 26–34)
MCH RBC QN AUTO: 30.4 PG (ref 26–34)
MCHC RBC AUTO-ENTMCNC: 32.7 G/DL (ref 30–36.5)
MCHC RBC AUTO-ENTMCNC: 33.7 G/DL (ref 30–36.5)
MCV RBC AUTO: 90.1 FL (ref 80–99)
MCV RBC AUTO: 92.2 FL (ref 80–99)
MONOCYTES # BLD: 0.7 K/UL (ref 0–1)
MONOCYTES NFR BLD: 5 % (ref 5–13)
NEUTS SEG # BLD: 10.6 K/UL (ref 1.8–8)
NEUTS SEG NFR BLD: 76 % (ref 32–75)
NRBC # BLD: 0.04 K/UL (ref 0–0.01)
NRBC # BLD: 0.1 K/UL (ref 0–0.01)
NRBC BLD-RTO: 0.3 PER 100 WBC
NRBC BLD-RTO: 0.6 PER 100 WBC
PLATELET # BLD AUTO: 176 K/UL (ref 150–400)
PLATELET # BLD AUTO: 185 K/UL (ref 150–400)
PMV BLD AUTO: 13 FL (ref 8.9–12.9)
POTASSIUM SERPL-SCNC: 4 MMOL/L (ref 3.5–5.1)
POTASSIUM SERPL-SCNC: 4.4 MMOL/L (ref 3.5–5.1)
PROT SERPL-MCNC: 5.2 G/DL (ref 6.4–8.2)
PROT SERPL-MCNC: 5.2 G/DL (ref 6.4–8.2)
RBC # BLD AUTO: 3.85 M/UL (ref 3.8–5.2)
RBC # BLD AUTO: 4.05 M/UL (ref 3.8–5.2)
SODIUM SERPL-SCNC: 133 MMOL/L (ref 136–145)
SODIUM SERPL-SCNC: 135 MMOL/L (ref 136–145)
THERAPEUTIC MAGNESI,THMG: 5.5 MG/DL (ref 4.8–8.4)
WBC # BLD AUTO: 14.2 K/UL (ref 3.6–11)
WBC # BLD AUTO: 16.8 K/UL (ref 3.6–11)

## 2020-07-07 PROCEDURE — 74011000258 HC RX REV CODE- 258: Performed by: OBSTETRICS & GYNECOLOGY

## 2020-07-07 PROCEDURE — 74011250637 HC RX REV CODE- 250/637: Performed by: OBSTETRICS & GYNECOLOGY

## 2020-07-07 PROCEDURE — 80053 COMPREHEN METABOLIC PANEL: CPT

## 2020-07-07 PROCEDURE — 74011250636 HC RX REV CODE- 250/636: Performed by: OBSTETRICS & GYNECOLOGY

## 2020-07-07 PROCEDURE — 65410000002 HC RM PRIVATE OB

## 2020-07-07 PROCEDURE — 74011250636 HC RX REV CODE- 250/636: Performed by: ANESTHESIOLOGY

## 2020-07-07 PROCEDURE — 85025 COMPLETE CBC W/AUTO DIFF WBC: CPT

## 2020-07-07 PROCEDURE — 85027 COMPLETE CBC AUTOMATED: CPT

## 2020-07-07 PROCEDURE — 36415 COLL VENOUS BLD VENIPUNCTURE: CPT

## 2020-07-07 PROCEDURE — 83735 ASSAY OF MAGNESIUM: CPT

## 2020-07-07 RX ADMIN — KETOROLAC TROMETHAMINE 30 MG: 30 INJECTION, SOLUTION INTRAMUSCULAR at 04:21

## 2020-07-07 RX ADMIN — MAGNESIUM SULFATE HEPTAHYDRATE 2 G/HR: 500 INJECTION, SOLUTION INTRAMUSCULAR; INTRAVENOUS at 06:05

## 2020-07-07 RX ADMIN — KETOROLAC TROMETHAMINE 30 MG: 30 INJECTION, SOLUTION INTRAMUSCULAR at 10:07

## 2020-07-07 RX ADMIN — KETOROLAC TROMETHAMINE 30 MG: 30 INJECTION, SOLUTION INTRAMUSCULAR at 16:33

## 2020-07-07 RX ADMIN — DIPHENHYDRAMINE HYDROCHLORIDE 12.5 MG: 50 INJECTION, SOLUTION INTRAMUSCULAR; INTRAVENOUS at 04:16

## 2020-07-07 RX ADMIN — SODIUM CHLORIDE, SODIUM LACTATE, POTASSIUM CHLORIDE, AND CALCIUM CHLORIDE 75 ML/HR: 600; 310; 30; 20 INJECTION, SOLUTION INTRAVENOUS at 06:07

## 2020-07-07 RX ADMIN — MAGNESIUM SULFATE HEPTAHYDRATE 1 G/HR: 500 INJECTION, SOLUTION INTRAMUSCULAR; INTRAVENOUS at 14:23

## 2020-07-07 RX ADMIN — MAGNESIUM SULFATE HEPTAHYDRATE 2 G/HR: 500 INJECTION, SOLUTION INTRAMUSCULAR; INTRAVENOUS at 01:07

## 2020-07-07 RX ADMIN — IBUPROFEN 800 MG: 400 TABLET, FILM COATED ORAL at 22:38

## 2020-07-07 RX ADMIN — OXYCODONE 5 MG: 5 TABLET ORAL at 20:07

## 2020-07-07 NOTE — PROGRESS NOTES
6930: Bedside and Verbal shift change report given to MILAGROS Clements RN (oncoming nurse) by VALENTE Palacios RN (offgoing nurse). Report included the following information SBAR, Kardex, Procedure Summary, Intake/Output, MAR, Accordion, Recent Results and Med Rec Status.

## 2020-07-07 NOTE — LACTATION NOTE
This note was copied from a baby's chart. Initial Lactation Consultation - Baby born by  last evening to a  mom at 28 1/7 weeks gestation. Mom has a 1year old daughter that was in the NICU for 4 days after delivery. She breast fed her  for 4 months. She had to use a nipple shield and had to supplement the whole time she . Mom states this baby has been latching and nursing well. I helped mom with a feeding this morning. Mom is still in labor and delivery on Mag Sulfate. Baby latched quickly in the prone, cross cradle hold. He latched quickly and was sucking rhythmically with some swallowing noted. He nursed for 15 minutes on one breast and then another 5 minutes on the second breast. Mom encouraged to feed the baby at least every 3 hours. Reviewed effects/risks of late  delivery on initiation of breast feeding including infant's sleepiness, ineffective or missed breast feedings, infant's decreased stamina to sustain prolonged latch and effective breast feeding, decreased energy reserves related to low birth weight and inability to stimulate milk supply. Recommended interventions include skin to skin, feeding on cues and pumping as needed to ensure adequate milk supply.  Baby's blood sugars have been within normal limits. I talked to mom about pumping. She is not feeling up to pumping at this time. If baby continues to nurse well and his sugars continue to be above 40 we will reevaluate the need to pump this afternoon or evening.

## 2020-07-07 NOTE — PROGRESS NOTES
1130: Bedside and Verbal shift change report given to VAMSI Santa RN (oncoming nurse) by Gennette Manus. Ranell Collet RN (offgoing nurse). Report included the following information SBAR, Intake/Output, MAR and Recent Results.

## 2020-07-07 NOTE — ANESTHESIA POSTPROCEDURE EVALUATION
Procedure(s):   SECTION. spinal    Anesthesia Post Evaluation      Multimodal analgesia: multimodal analgesia used between 6 hours prior to anesthesia start to PACU discharge  Patient location during evaluation: PACU  Patient participation: complete - patient participated  Level of consciousness: awake  Pain score: 2  Pain management: adequate  Airway patency: patent  Anesthetic complications: no  Cardiovascular status: acceptable  Respiratory status: acceptable  Hydration status: acceptable  Comments: I have evaluated the patient and meets criteria for discharge from PACU. Lisa Espino MD  Post anesthesia nausea and vomiting:  controlled      INITIAL Post-op Vital signs:   Vitals Value Taken Time   /83 2020 10:42 PM   Temp 36.6 °C (97.9 °F) 2020  7:03 PM   Pulse 83 2020 10:42 PM   Resp 16 2020 10:42 PM   SpO2 97 % 2020 11:14 PM   Vitals shown include unvalidated device data.

## 2020-07-07 NOTE — PROGRESS NOTES
Post-Operative Day Number 1 Progress Note    Patient POD#1 s/p CS in setting of preeclampsia with severe features. +nausea overnight. Right abdominal pain. No flatus. No headache. No sob/chest pain. Vitals:    Patient Vitals for the past 8 hrs:   BP Temp Pulse Resp SpO2   20 0801 (!) 145/91  71     20 0701 (!) 141/94  76 16 99 %   20 0601 (!) 146/92  68 16 98 %   20 0501 149/90  74 14 98 %   20 0401 (!) 150/93 98.1 °F (36.7 °C) 72 16 98 %   20 0301 (!) 140/92  70 14 98 %   20 0204 151/90  80 16 98 %     Temp (24hrs), Av.9 °F (36.6 °C), Min:97.7 °F (36.5 °C), Max:98.1 °F (36.7 °C)      Intake/Output Summary (Last 24 hours) at 2020 0904  Last data filed at 2020 0900  Gross per 24 hour   Intake 3217.08 ml   Output 4420 ml   Net -1202.92 ml           Exam:  Patient without distress. CV RRR  Resp CTAB               Abdomen soft, fundus firm at level of umbilicus; mildly tender along right mid abdomen. No distention. Hypoactive bowel sounds  Fundus firm               Incision dry and clean without erythema. Lower extremities 2+ edema    Lab/Data Review:  Recent Results (from the past 12 hour(s))   METABOLIC PANEL, COMPREHENSIVE    Collection Time: 20 12:05 AM   Result Value Ref Range    Sodium 135 (L) 136 - 145 mmol/L    Potassium 4.0 3.5 - 5.1 mmol/L    Chloride 103 97 - 108 mmol/L    CO2 19 (L) 21 - 32 mmol/L    Anion gap 13 5 - 15 mmol/L    Glucose 136 (H) 65 - 100 mg/dL    BUN 13 6 - 20 MG/DL    Creatinine 1.21 (H) 0.55 - 1.02 MG/DL    BUN/Creatinine ratio 11 (L) 12 - 20      GFR est AA >60 >60 ml/min/1.73m2    GFR est non-AA 52 (L) >60 ml/min/1.73m2    Calcium 8.3 (L) 8.5 - 10.1 MG/DL    Bilirubin, total 0.8 0.2 - 1.0 MG/DL    ALT (SGPT) 474 (H) 12 - 78 U/L    AST (SGOT) 285 (H) 15 - 37 U/L    Alk.  phosphatase 252 (H) 45 - 117 U/L    Protein, total 5.2 (L) 6.4 - 8.2 g/dL    Albumin 2.0 (L) 3.5 - 5.0 g/dL Globulin 3.2 2.0 - 4.0 g/dL    A-G Ratio 0.6 (L) 1.1 - 2.2     CBC W/O DIFF    Collection Time: 07/07/20 12:05 AM   Result Value Ref Range    WBC 16.8 (H) 3.6 - 11.0 K/uL    RBC 3.85 3.80 - 5.20 M/uL    HGB 11.6 11.5 - 16.0 g/dL    HCT 35.5 35.0 - 47.0 %    MCV 92.2 80.0 - 99.0 FL    MCH 30.1 26.0 - 34.0 PG    MCHC 32.7 30.0 - 36.5 g/dL    RDW 14.4 11.5 - 14.5 %    PLATELET 973 066 - 497 K/uL    NRBC 0.6 (H) 0  WBC    ABSOLUTE NRBC 0.10 (H) 0.00 - 0.01 K/uL   MAGNESIUM, THERAPEUTIC    Collection Time: 07/07/20 12:05 AM   Result Value Ref Range    Therapeutic magnesium 5.5 4.8 - 8.4 MG/DL         Assessment and Plan:  Pt s/p CS in setting of breech, preeclampsia with severe features. Bps mild range without treatment. lfts improving however increase overnight in Cr. Pt is diuresing well. Will decrease magnesium rate to 1 gram per hour and recheck labs at 10AM. Follow closely. Magnesium seizure ppx for 24 hrs. Routine pp/postop care.

## 2020-07-07 NOTE — PROGRESS NOTES
0744-Bedside and Verbal shift change report given to MILAGROS ClementsRN (oncoming nurse) by VALENTE Palacios RN (offgoing nurse). Report included the following information SBAR, Intake/Output, MAR and Recent Results.      3420-patient notified me she felt \"a gush\" of blood, moderate amount of blood on pad, Dr Lorri Coronado at the bedside, she visualized pad, plan is to redraw labs at noon and decrease mag to 1gm/hr, patient really tired, reports she didn't sleep much last night, will cluster care and hopefully allow patient to rest as much as possible today

## 2020-07-07 NOTE — OP NOTES
Section Delivery Operative Report     Date of Surgery: 2020     Preoperative Diagnosis: 35 weeks,  Breech s/p failed ECV, pre-eclampsia with severe features, cholestasis of pregnancy    Postoperative Diagnosis: same, delivered      Procedure: Procedure(s):   SECTION-Primary LTCS    Surgeon(s) and Role:     Maksim Newell,  - Primary     Anesthesia: Epidural    Findings: LBMI in breech presentation. Apgars 8,9. Weight pending. Normal maternal anatomy. Meconium stained amniotic fluid. Procedure Detail:    The patient was taken to the operating room, where spinal anesthesia was found to be adequate. The patient was prepped and draped in the normal sterile fashion. Pfannenstiel skin incision was made with the scalpel and carried down to the underlying fascia. The fascial incision was extended laterally with Pitts scissors. This fascial incision was grasped with Kocher clamps, tented up, and the underlying rectus muscles were dissected sharply. The inferior edge of the rectus fascia was grasped with Kocher clamps, tented up, and the underlying rectus muscle was dissected off sharply. The rectus muscle was divided in the midline bluntly. The peritoneum was entered bluntly with hemostat and extended inferiorly and superiorly with Metzenbaum scissors. The bladder blade was then inserted. The vesicouterine peritoneum was identified, grasped with pick-ups and entered sharply with Metzenbaum scissors. The bladder flap was then created digitally and the bladder blade was reinserted. A low transverse uterine incision was made with the scalpel and extended laterally with blunt finger dissection. The membranes were ruptured of meconium stained fluid. The babys buttocks was brought to the hysterotomy and delivered without difficulty. The body easily followed and with slight rotation of the body, the arms delivered without difficulty followed by the head which was then delivered atraumatically.  The nose and mouth were suctioned. The cord was clamped and cut in a delayed fashion and the baby was handed off to the waiting  care unit staff. Placenta was then delivered spontaneously. The uterus was exteriorized and cleared of all clots and debris. Due to uterine atony, 800mcg of cytotec was placed intrauterine. The uterine incision was closed in two layers. The first layer with running locked layer of 0 Vicryl. The second layer was an imbricating layer of 0 Vicryl with good hemostasis assured. The pelvis was washed with warm normal saline and the uterus was returned to the abdomen. Good hemostasis was again reassured. The paracolic gutters were washed with warm normal saline and cleared of all clots and debris. Good hemostasis was again reassured throughout. Seprafilm was placed over the uterine incision and the peritoneum was closed with 2-0 Vicryl in a running fashion. The rectus muscles were re-approximated over the bladder with an interrupted mattress stitch of 2-0 Vicryl. The fascia was closed with #1 Vicryl in a running fashion. Good hemostasis was assured. The subcuticular layers were reapproximated with 2-0 plain gut in interrupted fashion. The skin was closed with a 4-0 Monocryl in a subcuticular fashion. The patient tolerated the procedure well. Sponge, lap, and needle counts were correct times two and the patient was taken to recovery in stable condition.       Estimated Blood Loss: see nursing note for QBL    Specimens:   ID Type Source Tests Collected by Time Destination   1 : Placenta Fresh Uterine  Ashvin Persons, DO 2020 1824 Pathology        Cord Blood Results:  Information for the patient's :  Bethel Funk [[id number]]     Lab Results   Component Value Date/Time    ONEIDA IgG NEG 2020 06:27 PM    Bilirubin if ONEIDA pos: IF DIRECT EZRA POSITIVE, BILIRUBIN TO FOLLOW 2020 06:27 PM    ABO/Rh(D) Huong Rochelle POSITIVE 2020 06:27 PM     No results found for: APH, APCO2, APO2, AHCO3, ABEC, ABDC, O2ST, SITE, RSCOM     Prenatal Labs:  Lab Results   Component Value Date/Time    ABO/Rh(D) O POSITIVE 07/06/2020 12:02 PM    HBsAg, External Negative 01/02/2020    HIV, External Non-reactive 01/02/2020    Rubella, External Immune 01/02/2020    RPR, External non reactive 12/07/2016    Gonorrhea, External Negative 01/02/2020    Chlamydia, External Negative 01/02/2020    GrBStrep, External negative 06/03/2017

## 2020-07-08 LAB
ALBUMIN SERPL-MCNC: 1.8 G/DL (ref 3.5–5)
ALBUMIN/GLOB SERPL: 0.5 {RATIO} (ref 1.1–2.2)
ALP SERPL-CCNC: 254 U/L (ref 45–117)
ALT SERPL-CCNC: 243 U/L (ref 12–78)
ANION GAP SERPL CALC-SCNC: 7 MMOL/L (ref 5–15)
AST SERPL-CCNC: 80 U/L (ref 15–37)
BILIRUB SERPL-MCNC: 0.5 MG/DL (ref 0.2–1)
BUN SERPL-MCNC: 16 MG/DL (ref 6–20)
BUN/CREAT SERPL: 16 (ref 12–20)
CALCIUM SERPL-MCNC: 7.7 MG/DL (ref 8.5–10.1)
CHLORIDE SERPL-SCNC: 101 MMOL/L (ref 97–108)
CO2 SERPL-SCNC: 25 MMOL/L (ref 21–32)
CREAT SERPL-MCNC: 1.03 MG/DL (ref 0.55–1.02)
ERYTHROCYTE [DISTWIDTH] IN BLOOD BY AUTOMATED COUNT: 14.5 % (ref 11.5–14.5)
GLOBULIN SER CALC-MCNC: 3.8 G/DL (ref 2–4)
GLUCOSE SERPL-MCNC: 109 MG/DL (ref 65–100)
HCT VFR BLD AUTO: 36.9 % (ref 35–47)
HGB BLD-MCNC: 12.1 G/DL (ref 11.5–16)
MCH RBC QN AUTO: 30.2 PG (ref 26–34)
MCHC RBC AUTO-ENTMCNC: 32.8 G/DL (ref 30–36.5)
MCV RBC AUTO: 92 FL (ref 80–99)
NRBC # BLD: 0 K/UL (ref 0–0.01)
NRBC BLD-RTO: 0 PER 100 WBC
PLATELET # BLD AUTO: 183 K/UL (ref 150–400)
PMV BLD AUTO: 12.4 FL (ref 8.9–12.9)
POTASSIUM SERPL-SCNC: 4.2 MMOL/L (ref 3.5–5.1)
PROT SERPL-MCNC: 5.6 G/DL (ref 6.4–8.2)
RBC # BLD AUTO: 4.01 M/UL (ref 3.8–5.2)
SODIUM SERPL-SCNC: 133 MMOL/L (ref 136–145)
WBC # BLD AUTO: 14 K/UL (ref 3.6–11)

## 2020-07-08 PROCEDURE — 85027 COMPLETE CBC AUTOMATED: CPT

## 2020-07-08 PROCEDURE — 65410000002 HC RM PRIVATE OB

## 2020-07-08 PROCEDURE — 80053 COMPREHEN METABOLIC PANEL: CPT

## 2020-07-08 PROCEDURE — 74011250637 HC RX REV CODE- 250/637: Performed by: OBSTETRICS & GYNECOLOGY

## 2020-07-08 PROCEDURE — 36415 COLL VENOUS BLD VENIPUNCTURE: CPT

## 2020-07-08 RX ADMIN — DOCUSATE SODIUM 100 MG: 100 CAPSULE, LIQUID FILLED ORAL at 09:55

## 2020-07-08 RX ADMIN — OXYCODONE 10 MG: 5 TABLET ORAL at 18:47

## 2020-07-08 RX ADMIN — OXYCODONE 5 MG: 5 TABLET ORAL at 14:22

## 2020-07-08 RX ADMIN — OXYCODONE 5 MG: 5 TABLET ORAL at 09:55

## 2020-07-08 RX ADMIN — OXYCODONE 10 MG: 5 TABLET ORAL at 22:48

## 2020-07-08 RX ADMIN — OXYCODONE 10 MG: 5 TABLET ORAL at 00:10

## 2020-07-08 RX ADMIN — IBUPROFEN 800 MG: 400 TABLET, FILM COATED ORAL at 06:15

## 2020-07-08 RX ADMIN — OXYCODONE 10 MG: 5 TABLET ORAL at 04:32

## 2020-07-08 RX ADMIN — IBUPROFEN 800 MG: 400 TABLET, FILM COATED ORAL at 22:48

## 2020-07-08 RX ADMIN — IBUPROFEN 800 MG: 400 TABLET, FILM COATED ORAL at 14:22

## 2020-07-08 NOTE — LACTATION NOTE
This note was copied from a baby's chart. Consult for infant receiving phototherapy and has order to supplement with EBM. Mother pumped 2 ml which was fed to baby via syringe. Parents taught how to do this. Given low volume of EBM as supplement I recommend mother continue to offer breast and give EBM. Dr Kleber Best agreed that if baby needs more than mother has to offer in supplement baby can receive formula up to 15 ml. Parents updated on plan. And baby brought to breast with my assistance, as giving the 2 ml woke baby and made him eager to eat. Mother's milk is beginning to come in. Baby audibly swallowing from each breast.  Baby more settled after nursing. Bili blanket used during feed.

## 2020-07-08 NOTE — PROGRESS NOTES
Post-Operative Day Number 2 Progress Note    Patient doing well post-op day 2 from  delivery without significant complaints. Pain controlled on current medication. Voiding without difficulty, normal lochia. Gas pains. Feeling better today. Vitals:    Patient Vitals for the past 8 hrs:   BP Temp Pulse Resp   20 0945 (!) 152/95 98 °F (36.7 °C) 80 16     Temp (24hrs), Av.4 °F (36.9 °C), Min:98 °F (36.7 °C), Max:98.7 °F (37.1 °C)      Repeat bp now 134/86    Exam:  Patient without distress. Abdomen soft, fundus firm at level of umbilicus, non tender. Incision dry and clean without erythema. Lower extremities 1+ edema    Lab/Data Review:  Recent Results (from the past 12 hour(s))   CBC W/O DIFF    Collection Time: 20  4:24 AM   Result Value Ref Range    WBC 14.0 (H) 3.6 - 11.0 K/uL    RBC 4.01 3.80 - 5.20 M/uL    HGB 12.1 11.5 - 16.0 g/dL    HCT 36.9 35.0 - 47.0 %    MCV 92.0 80.0 - 99.0 FL    MCH 30.2 26.0 - 34.0 PG    MCHC 32.8 30.0 - 36.5 g/dL    RDW 14.5 11.5 - 14.5 %    PLATELET 730 199 - 444 K/uL    MPV 12.4 8.9 - 12.9 FL    NRBC 0.0 0  WBC    ABSOLUTE NRBC 0.00 0.00 - 1.61 K/uL   METABOLIC PANEL, COMPREHENSIVE    Collection Time: 20  4:24 AM   Result Value Ref Range    Sodium 133 (L) 136 - 145 mmol/L    Potassium 4.2 3.5 - 5.1 mmol/L    Chloride 101 97 - 108 mmol/L    CO2 25 21 - 32 mmol/L    Anion gap 7 5 - 15 mmol/L    Glucose 109 (H) 65 - 100 mg/dL    BUN 16 6 - 20 MG/DL    Creatinine 1.03 (H) 0.55 - 1.02 MG/DL    BUN/Creatinine ratio 16 12 - 20      GFR est AA >60 >60 ml/min/1.73m2    GFR est non-AA >60 >60 ml/min/1.73m2    Calcium 7.7 (L) 8.5 - 10.1 MG/DL    Bilirubin, total 0.5 0.2 - 1.0 MG/DL    ALT (SGPT) 243 (H) 12 - 78 U/L    AST (SGOT) 80 (H) 15 - 37 U/L    Alk.  phosphatase 254 (H) 45 - 117 U/L    Protein, total 5.6 (L) 6.4 - 8.2 g/dL    Albumin 1.8 (L) 3.5 - 5.0 g/dL    Globulin 3.8 2.0 - 4.0 g/dL    A-G Ratio 0.5 (L) 1.1 - 2.2           Assessment and Plan:  Patient pod#2 s/p cs for breech in setting of preeclampsia and cholestasis. Labs improving. Routine postop care. Bps not requiring treatment at this time. Discussed and consented parents for circ.

## 2020-07-08 NOTE — PROGRESS NOTES
Bedside shift change report given to Lawanda Pillai RN (oncoming nurse) by JENA Ibarra (offgoing nurse). Report included the following information SBAR. Mom provided with electric breast pump since baby's weight loss is 6.6% and triple phototherapy initiated. Education completed, patient indicates understanding. Plans to pump after nursing for 15 min. Will discuss with lactation.

## 2020-07-08 NOTE — PROGRESS NOTES
TRANSFER - IN REPORT:    Verbal report received from HUMAIRA Colorado RN(name) on [facility]  being received from L&D(unit) for routine progression of care      Report consisted of patients Situation, Background, Assessment and   Recommendations(SBAR). Information from the following report(s) SBAR was reviewed with the receiving nurse. Opportunity for questions and clarification was provided. Assessment completed upon patients arrival to unit and care assumed.

## 2020-07-08 NOTE — PROGRESS NOTES
Anesthesia Post post Check    POD 1- C-sec. Patient is doing well. She has ambulated, pain is controlled, and she has voided. She has no complaints.     No Anesthetic complications    Lane Evangelista MD

## 2020-07-08 NOTE — ROUTINE PROCESS
Bedside and Verbal shift change report given to [id number] Yamil Marshall (oncoming nurse) by JENA Alicea (offgoing nurse). Report included the following information SBAR.

## 2020-07-08 NOTE — LACTATION NOTE
This note was copied from a baby's chart. Infant continued under phototherapy and fussy. Per mom infant nursed 15-20 minutes total between both sides and mom said she heard swallows. Discussed with family hands free pumping. .  Pt will successfully establish breast milk supply by breast feeding and  pumping with a hospital grade pump every 2-3 hours for approximately 20 minutes/8-10 x day with the correct size flange, and suction level for mother's comfort. To maximize milk production, mom taught to incorporate breast massage and hand expression into pumping sessions. All expressed breast milk (EBM) will be provided for infant use, in clean bottles/syringes. Proper cleaning of pump parts and good hand hygiene discussed. Progress of milk transition, pumping log, expected EBM volumes. Discussed using hands' free bra to facilitate intermittent massage and galactagogues. Discussed with parents late  infants and jaundice. Reviewed effects/risks of late  birth on initiation of breastfeeding including infant's sleepiness, ineffective or missed breastfeedings, infant's decreased stamina to sustain prolonged latch and effective breastfeeding, decreased energy reserves related to low birth wt and inability to stimulate milk supply. Recommended interventions include skin to skin bonding at breast, hand expression of colostrum as infant rests at breast and initiation of breastfeeding on demand as infant is able, initiation of pumping regimen as mom is able; complement/supplement feeding if medically indicated and ordered by pediatrician. Discussed at length with parents use of EBM/formula to supplement to help infant conserve energy and treat jaundice. Parents are receptive to formula as needed via syringe.

## 2020-07-09 VITALS
TEMPERATURE: 98.4 F | DIASTOLIC BLOOD PRESSURE: 86 MMHG | RESPIRATION RATE: 16 BRPM | HEART RATE: 98 BPM | OXYGEN SATURATION: 98 % | SYSTOLIC BLOOD PRESSURE: 128 MMHG

## 2020-07-09 LAB
ALBUMIN SERPL-MCNC: 1.8 G/DL (ref 3.5–5)
ALBUMIN/GLOB SERPL: 0.5 {RATIO} (ref 1.1–2.2)
ALP SERPL-CCNC: 254 U/L (ref 45–117)
ALT SERPL-CCNC: 152 U/L (ref 12–78)
ANION GAP SERPL CALC-SCNC: 8 MMOL/L (ref 5–15)
AST SERPL-CCNC: 37 U/L (ref 15–37)
BASOPHILS # BLD: 0.1 K/UL (ref 0–0.1)
BASOPHILS NFR BLD: 1 % (ref 0–1)
BILIRUB SERPL-MCNC: 0.4 MG/DL (ref 0.2–1)
BUN SERPL-MCNC: 18 MG/DL (ref 6–20)
BUN/CREAT SERPL: 19 (ref 12–20)
CALCIUM SERPL-MCNC: 8.2 MG/DL (ref 8.5–10.1)
CHLORIDE SERPL-SCNC: 103 MMOL/L (ref 97–108)
CO2 SERPL-SCNC: 22 MMOL/L (ref 21–32)
CREAT SERPL-MCNC: 0.95 MG/DL (ref 0.55–1.02)
DIFFERENTIAL METHOD BLD: ABNORMAL
EOSINOPHIL # BLD: 0.3 K/UL (ref 0–0.4)
EOSINOPHIL NFR BLD: 2 % (ref 0–7)
ERYTHROCYTE [DISTWIDTH] IN BLOOD BY AUTOMATED COUNT: 14.6 % (ref 11.5–14.5)
GLOBULIN SER CALC-MCNC: 3.8 G/DL (ref 2–4)
GLUCOSE SERPL-MCNC: 89 MG/DL (ref 65–100)
HCT VFR BLD AUTO: 36.1 % (ref 35–47)
HGB BLD-MCNC: 11.7 G/DL (ref 11.5–16)
IMM GRANULOCYTES # BLD AUTO: 0.3 K/UL (ref 0–0.04)
IMM GRANULOCYTES NFR BLD AUTO: 2 % (ref 0–0.5)
LYMPHOCYTES # BLD: 3.8 K/UL (ref 0.8–3.5)
LYMPHOCYTES NFR BLD: 29 % (ref 12–49)
MCH RBC QN AUTO: 30 PG (ref 26–34)
MCHC RBC AUTO-ENTMCNC: 32.4 G/DL (ref 30–36.5)
MCV RBC AUTO: 92.6 FL (ref 80–99)
MONOCYTES # BLD: 0.8 K/UL (ref 0–1)
MONOCYTES NFR BLD: 6 % (ref 5–13)
NEUTS SEG # BLD: 8.1 K/UL (ref 1.8–8)
NEUTS SEG NFR BLD: 60 % (ref 32–75)
NRBC # BLD: 0 K/UL (ref 0–0.01)
NRBC BLD-RTO: 0 PER 100 WBC
PLATELET # BLD AUTO: 198 K/UL (ref 150–400)
PMV BLD AUTO: 12 FL (ref 8.9–12.9)
POTASSIUM SERPL-SCNC: 4.2 MMOL/L (ref 3.5–5.1)
PROT SERPL-MCNC: 5.6 G/DL (ref 6.4–8.2)
RBC # BLD AUTO: 3.9 M/UL (ref 3.8–5.2)
SODIUM SERPL-SCNC: 133 MMOL/L (ref 136–145)
WBC # BLD AUTO: 13.3 K/UL (ref 3.6–11)

## 2020-07-09 PROCEDURE — 74011250637 HC RX REV CODE- 250/637: Performed by: OBSTETRICS & GYNECOLOGY

## 2020-07-09 PROCEDURE — 36415 COLL VENOUS BLD VENIPUNCTURE: CPT

## 2020-07-09 PROCEDURE — 80053 COMPREHEN METABOLIC PANEL: CPT

## 2020-07-09 PROCEDURE — 85025 COMPLETE CBC W/AUTO DIFF WBC: CPT

## 2020-07-09 RX ORDER — IBUPROFEN 800 MG/1
800 TABLET ORAL
Qty: 30 TAB | Refills: 1 | Status: SHIPPED | OUTPATIENT
Start: 2020-07-09

## 2020-07-09 RX ORDER — OXYCODONE HYDROCHLORIDE 5 MG/1
5 TABLET ORAL
Qty: 10 TAB | Refills: 0 | Status: SHIPPED | OUTPATIENT
Start: 2020-07-09 | End: 2020-07-12

## 2020-07-09 RX ADMIN — IBUPROFEN 800 MG: 400 TABLET, FILM COATED ORAL at 16:17

## 2020-07-09 RX ADMIN — OXYCODONE 10 MG: 5 TABLET ORAL at 14:31

## 2020-07-09 RX ADMIN — DOCUSATE SODIUM 100 MG: 100 CAPSULE, LIQUID FILLED ORAL at 09:18

## 2020-07-09 RX ADMIN — IBUPROFEN 800 MG: 400 TABLET, FILM COATED ORAL at 08:02

## 2020-07-09 RX ADMIN — OXYCODONE 10 MG: 5 TABLET ORAL at 04:30

## 2020-07-09 NOTE — PROGRESS NOTES
Bedside shift change report given to THEA Yepez (oncoming nurse) by Harper Jimenez (offgoing nurse). Report included the following information SBAR.

## 2020-07-09 NOTE — DISCHARGE INSTRUCTIONS
Patient Education        Postpartum: Care Instructions  Your Care Instructions  After childbirth (postpartum period), your body goes through many changes. Some of these changes happen over several weeks. In the hours after delivery, your body will begin to recover from childbirth while it prepares to breastfeed your . You may feel emotional during this time. Your hormones can shift your mood without warning for no clear reason. In the first couple of weeks after childbirth, many women have emotions that change from happy to sad. You may find it hard to sleep. You may cry a lot. This is called the \"baby blues. \" These overwhelming emotions often go away within a couple of days or weeks. But it's important to discuss your feelings with your doctor. It is easy to get too tired and overwhelmed during the first weeks after childbirth. Don't try to do too much. Get rest whenever you can, accept help from others, and eat well and drink plenty of fluids. In the first couple of weeks after giving birth, your doctor or midwife may want to check in with you and make a plan for any follow-up care you may need. You will likely have a complete postpartum visit in the first 3 months after delivery. At that time, your doctor or midwife will check on your recovery from childbirth. He or she will also see how you are doing with your emotions and talk about your concerns or questions. Follow-up care is a key part of your treatment and safety. Be sure to make and go to all appointments, and call your doctor if you are having problems. It's also a good idea to know your test results and keep a list of the medicines you take. How can you care for yourself at home? · Sleep or rest when your baby sleeps. · Get help with household chores from family or friends, if you can. Do not try to do it all yourself. · If you have hemorrhoids or swelling or pain around the opening of your vagina, try using cold and heat.  You can put ice or a cold pack on the area for 10 to 20 minutes at a time. Put a thin cloth between the ice and your skin. Also try sitting in a few inches of warm water (sitz bath) 3 times a day and after bowel movements. · Take pain medicines exactly as directed. ? If the doctor gave you a prescription medicine for pain, take it as prescribed. ? If you are not taking a prescription pain medicine, ask your doctor if you can take an over-the-counter medicine. · Eat more fiber to avoid constipation. Include foods such as whole-grain breads and cereals, raw vegetables, raw and dried fruits, and beans. · Drink plenty of fluids, enough so that your urine is light yellow or clear like water. If you have kidney, heart, or liver disease and have to limit fluids, talk with your doctor before you increase the amount of fluids you drink. · Do not rinse inside your vagina with fluids (douche). · If you have stitches, keep the area clean by pouring or spraying warm water over the area outside your vagina and anus after you use the toilet. · Keep a list of questions to ask your doctor or midwife. Your questions might be about:  ? Changes in your breasts, such as lumps or soreness. ? When to expect your menstrual period to start again. ? What form of birth control is best for you. ? Weight you have put on during the pregnancy. ? Exercise options. ? What foods and drinks are best for you, especially if you are breastfeeding. ? Problems you might be having with breastfeeding. ? When you can have sex. Some women may want to talk about lubricants for the vagina. ? Any feelings of sadness or restlessness that you are having. When should you call for help? Call 911 anytime you think you may need emergency care. For example, call if:  · You have thoughts of harming yourself, your baby, or another person. · You passed out (lost consciousness). · You have chest pain, are short of breath, or cough up blood.   · You have a seizure. Call your doctor now or seek immediate medical care if:  · Your vaginal bleeding seems to be getting heavier. · You are dizzy or lightheaded, or you feel like you may faint. · You have a fever. · You have new or more belly pain. · You have symptoms of a blood clot in your leg (called a deep vein thrombosis), such as:  ? Pain in the calf, back of the knee, thigh, or groin. ? Redness and swelling in your leg or groin. · You have signs of preeclampsia, such as:  ? Sudden swelling of your face, hands, or feet. ? New vision problems (such as dimness, blurring, or seeing spots). ? A severe headache. Watch closely for changes in your health, and be sure to contact your doctor if:  · You have new or worse vaginal discharge. · You feel sad or depressed. · You are having problems with your breasts or breastfeeding. Where can you learn more? Go to http://natalie-morenita.info/  Enter G925 in the search box to learn more about \"Postpartum: Care Instructions. \"  Current as of: February 11, 2020               Content Version: 12.5  © 1144-9638 [employer]. Care instructions adapted under license by [employer] (which disclaims liability or warranty for this information). If you have questions about a medical condition or this instruction, always ask your healthcare professional. [address] any warranty or liability for your use of this information.

## 2020-07-09 NOTE — LACTATION NOTE
This note was copied from a baby's chart. Infant now off phototherapy. Baby nursing well and has improved throughout post partum stay, deep latch maintained, mother is comfortable, milk is in transition, baby feeding vigorously with rhythmic suck, swallow, breathe pattern, with audible swallowing, and evident milk transfer, both breasts offerd, baby is asleep following feeding. Baby is feeding on demand, voiding and stools present as appropriate over the last 24 hours. Wt loss at 10% supplement of EBM/formula continuing after nursing until follow up with pediatrician tomorrow. Mother's milk is coming in. Mother states that she has no further questions for Lactation Consultant before discharge.

## 2020-07-09 NOTE — DISCHARGE SUMMARY
Obstetrical Discharge Summary     Name: Evin Clemens MRN: [id number]  SSN: [id number]    YOB: 1990  Age: 31 y.o. Sex: female      Allergies: Pcn [penicillins]    Admit Date: 2020    Discharge Date: 2020     Admitting Physician: Carmen Aiken DO     Attending Physician:  Cynthia Mcdonald MD     * Admission Diagnoses: Pre-eclampsia [O14.90], breech, 35 weeks, cholestasis    * Discharge Diagnoses:   Information for the patient's :  Chavo Ball [[id number]]   Delivery of a 3.075 kg male infant via , Low Transverse on 2020 at 6:12 PM  by Carmen Aiken. Apgars were 8  and 9 . Additional Diagnoses:   Hospital Problems as of 2020 Date Reviewed: 2019          Codes Class Noted - Resolved POA    Pre-eclampsia ICD-10-CM: O14.90  ICD-9-CM: 642.40  2020 - Present Unknown             Lab Results   Component Value Date/Time    ABO/Rh(D) O POSITIVE 2020 12:02 PM    Rubella, External Immune 2020    GrBStrep, External negative 2017    ABO,Rh O Positive 2020    There is no immunization history for the selected administration types on file for this patient. * Procedures: attempted external cephalic version  Procedure(s):   SECTION           * Discharge Condition: good    [facility] Course: patient admitted at 35 weeks with worsening preeclampsia. Unsuccessful ecv. Proceeded to  section for breech position. Postpartum magnesium 24 hours. Lfts, cr improved at time of discharge. Routine postop care. * Disposition: Home    Discharge Medications:   Current Discharge Medication List      START taking these medications    Details   ibuprofen (MOTRIN) 800 mg tablet Take 1 Tab by mouth every eight (8) hours as needed for Pain. Qty: 30 Tab, Refills: 1      oxyCODONE IR (ROXICODONE) 5 mg immediate release tablet Take 1 Tab by mouth every four (4) hours as needed for Pain for up to 3 days.  Max Daily Amount: 30 mg.  Jairon Dixon: 10 Tab, Refills: 0    Associated Diagnoses: Encounter for postoperative care         CONTINUE these medications which have NOT CHANGED    Details   PNV No12-Iron-FA-DSS-OM-3 29 mg iron-1 mg -50 mg CPKD Take  by mouth. STOP taking these medications       ursodioL (ACTIGALL) 300 mg capsule Comments:   Reason for Stopping:         famotidine (Pepcid) 20 mg tablet Comments:   Reason for Stopping:         aspirin delayed-release 81 mg tablet Comments:   Reason for Stopping:               * Follow-up Care/Patient Instructions: Activity: walking, stairs ok as tolerated. No more lifting than 10 pounds. No sex/tampons/baths/swimming 6 weeks  Diet: Regular Diet  Wound Care: keep dressing on - can shower. May remove if starts peeling off.     Follow-up Information     Follow up With Specialties Details Why Contact Info    Madlyn Schirmer, MD Gynecology, Obstetrics Call in 1 week  [address]

## 2020-07-09 NOTE — PROGRESS NOTES
Post-Operative Day Number 3 Progress/Discharge Note    Patient doing well post-op day 3 from  delivery without significant complaints. Pain controlled on current medication. Voiding without difficulty, normal lochia. Vitals:    Patient Vitals for the past 8 hrs:   BP Temp Pulse Resp   20 0930 128/86 98.4 °F (36.9 °C) 98 16     Temp (24hrs), Av.5 °F (36.9 °C), Min:98.3 °F (36.8 °C), Max:98.9 °F (37.2 °C)      Vital signs stable, afebrile. Exam:  Patient without distress. Abdomen soft, fundus firm at level of umbilicus, non tender. Dressing dry             Lower extremities are negative for swelling, cords or tenderness. Lab/Data Review:  Recent Results (from the past 12 hour(s))   CBC WITH AUTOMATED DIFF    Collection Time: 20  4:39 AM   Result Value Ref Range    WBC 13.3 (H) 3.6 - 11.0 K/uL    RBC 3.90 3.80 - 5.20 M/uL    HGB 11.7 11.5 - 16.0 g/dL    HCT 36.1 35.0 - 47.0 %    MCV 92.6 80.0 - 99.0 FL    MCH 30.0 26.0 - 34.0 PG    MCHC 32.4 30.0 - 36.5 g/dL    RDW 14.6 (H) 11.5 - 14.5 %    PLATELET 427 153 - 284 K/uL    MPV 12.0 8.9 - 12.9 FL    NRBC 0.0 0  WBC    ABSOLUTE NRBC 0.00 0.00 - 0.01 K/uL    NEUTROPHILS 60 32 - 75 %    LYMPHOCYTES 29 12 - 49 %    MONOCYTES 6 5 - 13 %    EOSINOPHILS 2 0 - 7 %    BASOPHILS 1 0 - 1 %    IMMATURE GRANULOCYTES 2 (H) 0.0 - 0.5 %    ABS. NEUTROPHILS 8.1 (H) 1.8 - 8.0 K/UL    ABS. LYMPHOCYTES 3.8 (H) 0.8 - 3.5 K/UL    ABS. MONOCYTES 0.8 0.0 - 1.0 K/UL    ABS. EOSINOPHILS 0.3 0.0 - 0.4 K/UL    ABS. BASOPHILS 0.1 0.0 - 0.1 K/UL    ABS. IMM.  GRANS. 0.3 (H) 0.00 - 0.04 K/UL    DF AUTOMATED     METABOLIC PANEL, COMPREHENSIVE    Collection Time: 20  4:39 AM   Result Value Ref Range    Sodium 133 (L) 136 - 145 mmol/L    Potassium 4.2 3.5 - 5.1 mmol/L    Chloride 103 97 - 108 mmol/L    CO2 22 21 - 32 mmol/L    Anion gap 8 5 - 15 mmol/L    Glucose 89 65 - 100 mg/dL    BUN 18 6 - 20 MG/DL Creatinine 0.95 0.55 - 1.02 MG/DL    BUN/Creatinine ratio 19 12 - 20      GFR est AA >60 >60 ml/min/1.73m2    GFR est non-AA >60 >60 ml/min/1.73m2    Calcium 8.2 (L) 8.5 - 10.1 MG/DL    Bilirubin, total 0.4 0.2 - 1.0 MG/DL    ALT (SGPT) 152 (H) 12 - 78 U/L    AST (SGOT) 37 15 - 37 U/L    Alk. phosphatase 254 (H) 45 - 117 U/L    Protein, total 5.6 (L) 6.4 - 8.2 g/dL    Albumin 1.8 (L) 3.5 - 5.0 g/dL    Globulin 3.8 2.0 - 4.0 g/dL    A-G Ratio 0.5 (L) 1.1 - 2.2           Assessment and Plan:  Patient pod#3 s/p cs - breech, severe preeclampsia, cholestasis. Bps not requiring treatment, labs improving. Boy-for circ now. F/u in 1 week in office.

## 2020-07-11 ENCOUNTER — HOSPITAL ENCOUNTER (EMERGENCY)
Age: 30
Discharge: HOME | End: 2020-07-11
Attending: STUDENT IN AN ORGANIZED HEALTH CARE EDUCATION/TRAINING PROGRAM
Payer: COMMERCIAL

## 2020-07-11 ENCOUNTER — APPOINTMENT (OUTPATIENT)
Dept: GENERAL RADIOLOGY | Age: 30
End: 2020-07-11
Attending: EMERGENCY MEDICINE
Payer: COMMERCIAL

## 2020-07-11 VITALS
OXYGEN SATURATION: 98 % | HEART RATE: 102 BPM | HEIGHT: 65 IN | RESPIRATION RATE: 16 BRPM | BODY MASS INDEX: 33.32 KG/M2 | WEIGHT: 200 LBS | TEMPERATURE: 98.8 F | DIASTOLIC BLOOD PRESSURE: 77 MMHG | SYSTOLIC BLOOD PRESSURE: 147 MMHG

## 2020-07-11 DIAGNOSIS — K59.00 CONSTIPATION, UNSPECIFIED CONSTIPATION TYPE: Primary | ICD-10-CM

## 2020-07-11 PROCEDURE — 74011250637 HC RX REV CODE- 250/637: Performed by: EMERGENCY MEDICINE

## 2020-07-11 PROCEDURE — 74019 RADEX ABDOMEN 2 VIEWS: CPT

## 2020-07-11 PROCEDURE — 99283 EMERGENCY DEPT VISIT LOW MDM: CPT

## 2020-07-11 RX ORDER — POLYETHYLENE GLYCOL 3350 17 G/17G
17 POWDER, FOR SOLUTION ORAL DAILY
Qty: 765 G | Refills: 0 | Status: SHIPPED | OUTPATIENT
Start: 2020-07-11

## 2020-07-11 RX ORDER — ADHESIVE BANDAGE
30 BANDAGE TOPICAL
Status: COMPLETED | OUTPATIENT
Start: 2020-07-11 | End: 2020-07-11

## 2020-07-11 RX ADMIN — MAGNESIUM HYDROXIDE 30 ML: 400 SUSPENSION ORAL at 11:43

## 2020-07-11 NOTE — DISCHARGE INSTRUCTIONS
Patient Education   Recommend 2 tablespoons  Milk of magnesia daily or  2 tablespoons MiraLAX or   4 ounces prune juice or pear juice  Increase fiber in your diet through oatmeal, fiber one cereals, or fiber one bars, fruits and vegetables. 1 ACAI  Friend tablet daily   Constipation: Care Instructions  Your Care Instructions     Constipation means that you have a hard time passing stools (bowel movements). People pass stools from 3 times a day to once every 3 days. What is normal for you may be different. Constipation may occur with pain in the rectum and cramping. The pain may get worse when you try to pass stools. Sometimes there are small amounts of bright red blood on toilet paper or the surface of stools. This is because of enlarged veins near the rectum (hemorrhoids). A few changes in your diet and lifestyle may help you avoid ongoing constipation. Your doctor may also prescribe medicine to help loosen your stool. Some medicines can cause constipation. These include pain medicines and antidepressants. Tell your doctor about all the medicines you take. Your doctor may want to make a medicine change to ease your symptoms. Follow-up care is a key part of your treatment and safety. Be sure to make and go to all appointments, and call your doctor if you are having problems. It's also a good idea to know your test results and keep a list of the medicines you take. How can you care for yourself at home? · Drink plenty of fluids, enough so that your urine is light yellow or clear like water. If you have kidney, heart, or liver disease and have to limit fluids, talk with your doctor before you increase the amount of fluids you drink. · Include high-fiber foods in your diet each day. These include fruits, vegetables, beans, and whole grains. · Get at least 30 minutes of exercise on most days of the week. Walking is a good choice.  You also may want to do other activities, such as running, swimming, cycling, or playing tennis or team sports. · Take a fiber supplement, such as Citrucel or Metamucil, every day. Read and follow all instructions on the label. · Schedule time each day for a bowel movement. A daily routine may help. Take your time having your bowel movement. · Support your feet with a small step stool when you sit on the toilet. This helps flex your hips and places your pelvis in a squatting position. · Your doctor may recommend an over-the-counter laxative to relieve your constipation. Examples are Milk of Magnesia and MiraLax. Read and follow all instructions on the label. Do not use laxatives on a long-term basis. When should you call for help? Call your doctor now or seek immediate medical care if:  · You have new or worse belly pain. · You have new or worse nausea or vomiting. · You have blood in your stools. Watch closely for changes in your health, and be sure to contact your doctor if:  · Your constipation is getting worse. · You do not get better as expected. Where can you learn more? Go to http://natalie-morenita.info/  Enter P343 in the search box to learn more about \"Constipation: Care Instructions. \"  Current as of: June 26, 2019               Content Version: 12.5  © 9726-8739 [employer]. Care instructions adapted under license by [employer] (which disclaims liability or warranty for this information). If you have questions about a medical condition or this instruction, always ask your healthcare professional. [place] [address] any warranty or liability for your use of this information.

## 2020-07-11 NOTE — ED TRIAGE NOTES
Pt ambulatory to ED with c/o of constipation after  on 20. \"I was taking Roxicodone up until yesterday\".

## 2020-07-11 NOTE — ED PROVIDER NOTES
Constipation    Associated symptoms include constipation. Pertinent negatives include no dysuria, no fever, no back pain, no vomiting and no diarrhea. Post-Op Problem   Pertinent negatives include no headaches and no shortness of breath. Patient is a 53-year-old female who is 5 days postpartum  (A0) with past medical history significant for gestational hypertension, depression/anxiety, and preeclampsia who presents to the ED with complaints of constipation. She reports she stopped taking her oxycodone yesterday and has not had a bowel movement in over 5 days. She contacted her OB/GYN doctor on call and was encouraged to try stool softeners and an enema at home. She states that she took Colace and gave herself a fleets enema without relief. Denies fever, cold symptoms, headache,neck pain, visual changes, focal weakness or rash. Denies any  difficulty breathing, difficulty swallowing, SOB or chest pain. Denies any nausea, vomiting, urinary symptoms or diarrhea. Pt. Reports taking Motrin 800 mg this morning for C section \"after birth\" pain. Old charts reviewed.       Past Medical History:   Diagnosis Date    Cholestasis during pregnancy 2020    Gestational hypertension     Preeclampsia 6/3/2017    Psychiatric problem     Depression and Anxiety - unmedicated       Past Surgical History:   Procedure Laterality Date    HX OTHER SURGICAL  2007    [place] teeth extraction         Family History:   Problem Relation Age of Onset    Hypertension Mother     Hypertension Father     Cancer Father        Social History     Socioeconomic History    Marital status:      Spouse name: Not on file    Number of children: Not on file    Years of education: Not on file    Highest education level: Not on file   Occupational History    Not on file   Social Needs    Financial resource strain: Not on file    Food insecurity     Worry: Not on file     Inability: Not on file   [employer] needs Medical: Not on file     Non-medical: Not on file   Tobacco Use    Smoking status: Never Smoker    Smokeless tobacco: Never Used   Substance and Sexual Activity    Alcohol use: Yes     Comment: 2-3 glasses per week prior to pregnancy    Drug use: No    Sexual activity: Yes     Partners: Male   Lifestyle    Physical activity     Days per week: Not on file     Minutes per session: Not on file    Stress: Not on file   Relationships    Social connections     Talks on phone: Not on file     Gets together: Not on file     Attends Taoist service: Not on file     Active member of club or organization: Not on file     Attends meetings of clubs or organizations: Not on file     Relationship status: Not on file    Intimate partner violence     Fear of current or ex partner: Not on file     Emotionally abused: Not on file     Physically abused: Not on file     Forced sexual activity: Not on file   Other Topics Concern     Service Not Asked    Blood Transfusions Not Asked    Caffeine Concern Not Asked    Occupational Exposure Not Asked   Carla Blizzard Hazards Not Asked    Sleep Concern Not Asked    Stress Concern Not Asked    Weight Concern Not Asked    Special Diet Not Asked    Back Care Not Asked    Exercise Not Asked    Bike Helmet Not Asked   [address] Not Asked    Self-Exams Not Asked   Social History Narrative    Not on file         ALLERGIES: Pcn [penicillins]    Review of Systems   Constitutional: Negative for activity change, appetite change, fever and unexpected weight change. HENT: Negative for congestion and trouble swallowing. Eyes: Negative for visual disturbance. Respiratory: Negative for cough and shortness of breath. Gastrointestinal: Positive for constipation. Negative for diarrhea and vomiting. Genitourinary: Negative for difficulty urinating and dysuria. Musculoskeletal: Negative for back pain and neck pain. Skin: Negative for rash.    Neurological: Negative for dizziness and headaches. All other systems reviewed and are negative. Vitals:    20 1118   BP: 147/77   Pulse: (!) 102   Resp: 16   Temp: 98.8 °F (37.1 °C)   SpO2: 98%   Weight: 90.7 kg (200 lb)   Height: 5' 5\" (1.651 m)            Physical Exam  Vitals signs and nursing note reviewed. Constitutional:       General: She is not in acute distress. Appearance: Normal appearance. She is not ill-appearing, toxic-appearing or diaphoretic. Comments: Female; 5 days post partum Csection (A0); non smoker   HENT:      Head: Normocephalic. Right Ear: Tympanic membrane normal.      Left Ear: Tympanic membrane normal.      Mouth/Throat:      Mouth: Mucous membranes are moist.      Pharynx: No posterior oropharyngeal erythema. Neck:      Musculoskeletal: Normal range of motion and neck supple. Cardiovascular:      Rate and Rhythm: Normal rate and regular rhythm. Pulmonary:      Effort: Pulmonary effort is normal.      Breath sounds: Normal breath sounds. Abdominal:      Palpations: Abdomen is soft. Tenderness: There is no guarding or rebound. Comments: Csection incision site is dry and intact; without any signs or symptoms of infection; good neurovascular sensation. Genitourinary:     Comments: Scant amount of vaginal bleeding  Musculoskeletal: Normal range of motion. Right lower leg: No edema. Left lower leg: No edema. Comments: Mild bilateral lower leg edema, symmetrical, nontender, without extending erythema   Lymphadenopathy:      Cervical: No cervical adenopathy. Skin:     General: Skin is warm and dry. Findings: No rash. Neurological:      General: No focal deficit present. Mental Status: She is alert and oriented to person, place, and time. Psychiatric:         Mood and Affect: Mood normal.         Behavior: Behavior normal.          MDM       Procedures   . Xr Abd Flat/ Erect    Result Date: 2020  Impression: Normal bowel gas pattern. Moderate fecal stasis. Patient was given a soapsuds enema in the ED with good results noted. She reports she feels much better. Bowel regimen recommendations were reviewed. Encourage close follow-up with her OB/GYN if symptoms persist.    12:25 PM  Patient's results and plan of care have been reviewed with her. Patient has verbally conveyed her understanding and agreement of her signs, symptoms, diagnosis, treatment and prognosis and additionally agrees to follow up as recommended or return to the Emergency Room should her condition change prior to follow-up. Discharge instructions have also been provided to the patient with some educational information regarding her diagnosis as well a list of reasons why she would want to return to the ER prior to her follow-up appointment should her condition change. Jay Malone NP  Discussed plan of care with Dr. Barbara Piedra. Jay Malone NP

## 2022-01-07 ENCOUNTER — TRANSCRIBE ORDER (OUTPATIENT)
Dept: SCHEDULING | Age: 32
End: 2022-01-07

## 2022-01-07 DIAGNOSIS — R10.11 RUQ PAIN: Primary | ICD-10-CM

## 2022-01-11 ENCOUNTER — HOSPITAL ENCOUNTER (OUTPATIENT)
Dept: ULTRASOUND IMAGING | Age: 32
Discharge: HOME | End: 2022-01-11
Attending: NURSE PRACTITIONER
Payer: COMMERCIAL

## 2022-01-11 DIAGNOSIS — R10.11 RUQ PAIN: ICD-10-CM

## 2022-01-11 PROCEDURE — 76705 ECHO EXAM OF ABDOMEN: CPT

## 2022-03-18 PROBLEM — O13.9 GESTATIONAL HYPERTENSION: Status: ACTIVE | Noted: 2020-07-01

## 2022-03-19 PROBLEM — O26.649 CHOLESTASIS DURING PREGNANCY: Status: ACTIVE | Noted: 2020-07-01

## 2022-03-19 PROBLEM — K83.1 CHOLESTASIS DURING PREGNANCY: Status: ACTIVE | Noted: 2020-07-01

## 2022-03-19 PROBLEM — O26.619 CHOLESTASIS DURING PREGNANCY: Status: ACTIVE | Noted: 2020-07-01

## 2022-03-20 PROBLEM — O14.90 PRE-ECLAMPSIA: Status: ACTIVE | Noted: 2020-07-06

## 2022-03-20 PROBLEM — O14.90 PREECLAMPSIA: Status: ACTIVE | Noted: 2017-06-03

## 2023-05-25 ENCOUNTER — OFFICE VISIT (OUTPATIENT)
Age: 33
End: 2023-05-25

## 2023-05-25 VITALS
TEMPERATURE: 97.2 F | HEIGHT: 65 IN | WEIGHT: 156.4 LBS | DIASTOLIC BLOOD PRESSURE: 89 MMHG | HEART RATE: 67 BPM | BODY MASS INDEX: 26.06 KG/M2 | SYSTOLIC BLOOD PRESSURE: 122 MMHG

## 2023-05-25 DIAGNOSIS — J02.9 SORE THROAT: Primary | ICD-10-CM

## 2023-05-25 DIAGNOSIS — J30.2 SEASONAL ALLERGIES: ICD-10-CM

## 2023-05-25 LAB
GROUP A STREP ANTIGEN, POC: NEGATIVE
VALID INTERNAL CONTROL, POC: YES

## 2023-05-25 RX ORDER — ACETAMINOPHEN 500 MG
500 TABLET ORAL 4 TIMES DAILY PRN
Qty: 360 TABLET | Refills: 1 | Status: SHIPPED | OUTPATIENT
Start: 2023-05-25

## 2023-05-25 RX ORDER — FLUTICASONE PROPIONATE 50 MCG
2 SPRAY, SUSPENSION (ML) NASAL DAILY
Qty: 16 G | Refills: 0 | Status: SHIPPED | OUTPATIENT
Start: 2023-05-25

## 2023-05-25 RX ORDER — CETIRIZINE HYDROCHLORIDE 10 MG/1
10 TABLET ORAL DAILY
Qty: 30 TABLET | Refills: 0 | Status: SHIPPED | OUTPATIENT
Start: 2023-05-25 | End: 2023-06-24

## 2023-05-25 RX ORDER — GUAIFENESIN 600 MG/1
600 TABLET, EXTENDED RELEASE ORAL 2 TIMES DAILY
Qty: 30 TABLET | Refills: 0 | Status: SHIPPED | OUTPATIENT
Start: 2023-05-25 | End: 2023-06-09

## 2023-05-25 ASSESSMENT — ENCOUNTER SYMPTOMS
RHINORRHEA: 0
SHORTNESS OF BREATH: 0
CHEST TIGHTNESS: 0
SORE THROAT: 1
NAUSEA: 0
DIARRHEA: 0
EYES NEGATIVE: 1
WHEEZING: 0
ABDOMINAL PAIN: 0
COUGH: 1
VOMITING: 0
CHOKING: 0

## 2025-02-02 ENCOUNTER — HOSPITAL ENCOUNTER (EMERGENCY)
Facility: HOSPITAL | Age: 35
Discharge: HOME | End: 2025-02-03
Attending: EMERGENCY MEDICINE
Payer: COMMERCIAL

## 2025-02-02 ENCOUNTER — APPOINTMENT (OUTPATIENT)
Facility: HOSPITAL | Age: 35
End: 2025-02-02
Payer: COMMERCIAL

## 2025-02-02 DIAGNOSIS — N83.201 RIGHT OVARIAN CYST: Primary | ICD-10-CM

## 2025-02-02 LAB
ALBUMIN SERPL-MCNC: 4.2 G/DL (ref 3.5–5)
ALBUMIN/GLOB SERPL: 1.3 (ref 1.1–2.2)
ALP SERPL-CCNC: 58 U/L (ref 45–117)
ALT SERPL-CCNC: 29 U/L (ref 12–78)
ANION GAP SERPL CALC-SCNC: 4 MMOL/L (ref 2–12)
APPEARANCE UR: CLEAR
AST SERPL-CCNC: 17 U/L (ref 15–37)
BACTERIA URNS QL MICRO: NEGATIVE /HPF
BASOPHILS # BLD: 0.03 K/UL (ref 0–0.1)
BASOPHILS NFR BLD: 0.4 % (ref 0–1)
BILIRUB SERPL-MCNC: 0.3 MG/DL (ref 0.2–1)
BILIRUB UR QL: NEGATIVE
BUN SERPL-MCNC: 18 MG/DL (ref 6–20)
BUN/CREAT SERPL: 18 (ref 12–20)
CALCIUM SERPL-MCNC: 9.5 MG/DL (ref 8.5–10.1)
CHLORIDE SERPL-SCNC: 105 MMOL/L (ref 97–108)
CO2 SERPL-SCNC: 28 MMOL/L (ref 21–32)
COLOR UR: ABNORMAL
COMMENT:: NORMAL
CREAT SERPL-MCNC: 0.99 MG/DL (ref 0.55–1.02)
DIFFERENTIAL METHOD BLD: ABNORMAL
EOSINOPHIL # BLD: 0.1 K/UL (ref 0–0.4)
EOSINOPHIL NFR BLD: 1.2 % (ref 0–7)
EPITH CASTS URNS QL MICRO: ABNORMAL /LPF
ERYTHROCYTE [DISTWIDTH] IN BLOOD BY AUTOMATED COUNT: 12.2 % (ref 11.5–14.5)
GLOBULIN SER CALC-MCNC: 3.3 G/DL (ref 2–4)
GLUCOSE SERPL-MCNC: 130 MG/DL (ref 65–100)
GLUCOSE UR STRIP.AUTO-MCNC: NEGATIVE MG/DL
HCG UR QL: NEGATIVE
HCT VFR BLD AUTO: 37.5 % (ref 35–47)
HGB BLD-MCNC: 12.7 G/DL (ref 11.5–16)
HGB UR QL STRIP: NEGATIVE
IMM GRANULOCYTES # BLD AUTO: 0.02 K/UL (ref 0–0.04)
IMM GRANULOCYTES NFR BLD AUTO: 0.2 % (ref 0–0.5)
KETONES UR QL STRIP.AUTO: ABNORMAL MG/DL
LEUKOCYTE ESTERASE UR QL STRIP.AUTO: NEGATIVE
LYMPHOCYTES # BLD: 2.32 K/UL (ref 0.8–3.5)
LYMPHOCYTES NFR BLD: 28.8 % (ref 12–49)
MCH RBC QN AUTO: 30.1 PG (ref 26–34)
MCHC RBC AUTO-ENTMCNC: 33.9 G/DL (ref 30–36.5)
MCV RBC AUTO: 88.9 FL (ref 80–99)
MONOCYTES # BLD: 0.38 K/UL (ref 0–1)
MONOCYTES NFR BLD: 4.7 % (ref 5–13)
NEUTS SEG # BLD: 5.21 K/UL (ref 1.8–8)
NEUTS SEG NFR BLD: 64.7 % (ref 32–75)
NITRITE UR QL STRIP.AUTO: NEGATIVE
NRBC # BLD: 0 K/UL (ref 0–0.01)
NRBC BLD-RTO: 0 PER 100 WBC
PH UR STRIP: 5 (ref 5–8)
PLATELET # BLD AUTO: 211 K/UL (ref 150–400)
PMV BLD AUTO: 11 FL (ref 8.9–12.9)
POTASSIUM SERPL-SCNC: 3.6 MMOL/L (ref 3.5–5.1)
PROT SERPL-MCNC: 7.5 G/DL (ref 6.4–8.2)
PROT UR STRIP-MCNC: NEGATIVE MG/DL
RBC # BLD AUTO: 4.22 M/UL (ref 3.8–5.2)
RBC #/AREA URNS HPF: ABNORMAL /HPF (ref 0–5)
SODIUM SERPL-SCNC: 137 MMOL/L (ref 136–145)
SP GR UR REFRACTOMETRY: 1.02 (ref 1–1.03)
SPECIMEN HOLD: NORMAL
SPECIMEN HOLD: NORMAL
UROBILINOGEN UR QL STRIP.AUTO: 0.2 EU/DL (ref 0.2–1)
WBC # BLD AUTO: 8.1 K/UL (ref 3.6–11)
WBC URNS QL MICRO: ABNORMAL /HPF (ref 0–4)

## 2025-02-02 PROCEDURE — 36415 COLL VENOUS BLD VENIPUNCTURE: CPT

## 2025-02-02 PROCEDURE — 81001 URINALYSIS AUTO W/SCOPE: CPT

## 2025-02-02 PROCEDURE — 76830 TRANSVAGINAL US NON-OB: CPT

## 2025-02-02 PROCEDURE — 81025 URINE PREGNANCY TEST: CPT

## 2025-02-02 PROCEDURE — 76856 US EXAM PELVIC COMPLETE: CPT

## 2025-02-02 PROCEDURE — 99284 EMERGENCY DEPT VISIT MOD MDM: CPT

## 2025-02-02 PROCEDURE — 80053 COMPREHEN METABOLIC PANEL: CPT

## 2025-02-02 PROCEDURE — 85025 COMPLETE CBC W/AUTO DIFF WBC: CPT

## 2025-02-03 VITALS
HEART RATE: 66 BPM | TEMPERATURE: 98.2 F | DIASTOLIC BLOOD PRESSURE: 73 MMHG | RESPIRATION RATE: 17 BRPM | OXYGEN SATURATION: 99 % | SYSTOLIC BLOOD PRESSURE: 113 MMHG

## 2025-02-03 NOTE — ED TRIAGE NOTES
Patient ambulatory with a CC of RLQ abdominal pain that started 1 hour ago. Patient stated pain started during intercourse.    LMP 12 days ago. Denies painful urination, no changes in BM. Endorses some nausea.

## 2025-02-03 NOTE — DISCHARGE INSTRUCTIONS
Return to the ED for new or worsening symptoms.  Your blood work was reassuring and your pregnancy test was negative.  Your ultrasound did show a cyst of the right ovary-this is very likely the source of your pain tonight.  Follow-up with your gynecologist.  Ibuprofen or Tylenol as needed.

## 2025-02-03 NOTE — ED PROVIDER NOTES
film images such as CT, Ultrasound and MRI are read by the radiologist. Plain radiographic images are visualized and preliminarily interpreted by the emergency physician with the below findings:        Interpretation per the Radiologist below, if available at the time of this note:    US PELVIS COMPLETE   Final Result   Normal uterus and endometrial stripe. 2.7 cm right ovarian cyst is likely   physiologic. Trace pelvic free fluid is likely physiologic.      Electronically signed by Atif Boyd      US NON OB TRANSVAGINAL   Final Result   Normal uterus and endometrial stripe. 2.7 cm right ovarian cyst is likely   physiologic. Trace pelvic free fluid is likely physiologic.      Electronically signed by Atif Boyd           LABS:  Labs Reviewed   CBC WITH AUTO DIFFERENTIAL - Abnormal; Notable for the following components:       Result Value    Monocytes % 4.7 (*)     All other components within normal limits   COMPREHENSIVE METABOLIC PANEL - Abnormal; Notable for the following components:    Glucose 130 (*)     All other components within normal limits   URINALYSIS WITH MICROSCOPIC - Abnormal; Notable for the following components:    Ketones, Urine TRACE (*)     All other components within normal limits   URINE CULTURE HOLD SAMPLE   EXTRA TUBES HOLD   POC PREGNANCY UR-QUAL   POC PREGNANCY UR-QUAL       All other labs were within normal range or not returned as of this dictation.    EMERGENCY DEPARTMENT COURSE and DIFFERENTIAL DIAGNOSIS/MDM:   Vitals:    Vitals:    02/02/25 2230   BP: (!) 151/77   Pulse: 77   Resp: 16   Temp: 98.8 °F (37.1 °C)   TempSrc: Oral   SpO2: 98%           Medical Decision Making  34-year-old female presenting to the ED for right pelvic pain that started during intercourse tonight.  Broad differential diagnosis including ovarian pathology, cystitis, PID, distal ureteral stone, appendicitis, enteritis, epiploic appendagitis, etc.    Labs reassuring, hCG negative.  Ultrasound showing right

## 2025-03-17 ENCOUNTER — HOSPITAL ENCOUNTER (OUTPATIENT)
Facility: HOSPITAL | Age: 35
Discharge: HOME | End: 2025-03-20
Payer: COMMERCIAL

## 2025-03-17 DIAGNOSIS — R11.0 NAUSEA: ICD-10-CM

## 2025-03-17 DIAGNOSIS — R68.81 EARLY SATIETY: ICD-10-CM

## 2025-03-17 DIAGNOSIS — R10.13 DYSPEPSIA: ICD-10-CM

## 2025-03-17 PROCEDURE — 78264 GASTRIC EMPTYING IMG STUDY: CPT

## 2025-03-17 PROCEDURE — 3430000000 HC RX DIAGNOSTIC RADIOPHARMACEUTICAL: Performed by: PHYSICIAN ASSISTANT

## 2025-03-17 PROCEDURE — A9541 TC99M SULFUR COLLOID: HCPCS | Performed by: PHYSICIAN ASSISTANT

## 2025-03-17 RX ORDER — TECHNETIUM TC 99M SULFUR COLLOID 2 MG
1 KIT MISCELLANEOUS ONCE
Status: COMPLETED | OUTPATIENT
Start: 2025-03-17 | End: 2025-03-17

## 2025-03-17 RX ADMIN — TECHNETIUM TC 99M SULFUR COLLOID 1 MILLICURIE: KIT at 08:00

## (undated) DEVICE — SOLUTION IRRIG 1000ML H2O STRL BLT

## (undated) DEVICE — TIP CLEANER: Brand: VALLEYLAB

## (undated) DEVICE — ELECTROSURGICAL DEVICE HOLSTER;FOR USE WITH MAXIMUM PEAK VOLTAGE OF 4000 V: Brand: FORCE TRIVERSE

## (undated) DEVICE — REM POLYHESIVE ADULT PATIENT RETURN ELECTRODE: Brand: VALLEYLAB

## (undated) DEVICE — SOLUTION IV 1000ML 0.9% SOD CHL

## (undated) DEVICE — LIGHT HANDLE: Brand: DEVON

## (undated) DEVICE — STERILE POLYISOPRENE POWDER-FREE SURGICAL GLOVES: Brand: PROTEXIS

## (undated) DEVICE — STERILE POLYISOPRENE POWDER-FREE SURGICAL GLOVES WITH EMOLLIENT COATING: Brand: PROTEXIS

## (undated) DEVICE — APPLICATOR BNDG 1MM ADH PREMIERPRO EXOFIN

## (undated) DEVICE — ANESTHESIA TRAY SPNL W/O NDL PENCAN

## (undated) DEVICE — SUTURE VCRL SZ 2-0 L36IN ABSRB VLT L36MM CT-1 1/2 CIR J345H

## (undated) DEVICE — 3000CC GUARDIAN II: Brand: GUARDIAN

## (undated) DEVICE — DRAPE FLD WRM W44XL66IN C6L FOR INTRATEMP SYS THERMABASIN

## (undated) DEVICE — BARRIER TISS ABSRB 5X6IN 1/PK -- DIRECT ORDER ONLY NON MEDLINE

## (undated) DEVICE — SOLIDIFIER MEDC 1200ML -- CONVERT TO 356117

## (undated) DEVICE — KENDALL SCD EXPRESS SLEEVES, KNEE LENGTH, MEDIUM: Brand: KENDALL SCD

## (undated) DEVICE — SUTURE MCRYL SZ 4-0 L27IN ABSRB UD L24MM PS-1 3/8 CIR PRIM Y935H

## (undated) DEVICE — COVERALL PREM SMS 2XL KNIT --

## (undated) DEVICE — SUTURE VCRL SZ 1 L36IN ABSRB VLT L36MM CT-1 1/2 CIR J347H

## (undated) DEVICE — PACK PROCEDURE SURG C SECT KT SMH

## (undated) DEVICE — DEVON™ KNEE AND BODY STRAP 60" X 3" (1.5 M X 7.6 CM): Brand: DEVON

## (undated) DEVICE — ROYALSILK SURGICAL GOWN, L: Brand: CONVERTORS

## (undated) DEVICE — SUTURE MCRYL SZ 0 L36IN ABSRB UD L36MM CT-1 1/2 CIR Y946H

## (undated) DEVICE — SUTURE PLN GUT SZ 2-0 L27IN ABSRB YELLOWISH TAN L70MM XLH 53T

## (undated) DEVICE — (D)PREP SKN CHLRAPRP APPL 26ML -- CONVERT TO ITEM 371833